# Patient Record
Sex: MALE | Race: WHITE | Employment: FULL TIME | URBAN - METROPOLITAN AREA
[De-identification: names, ages, dates, MRNs, and addresses within clinical notes are randomized per-mention and may not be internally consistent; named-entity substitution may affect disease eponyms.]

---

## 2020-06-18 NOTE — LETTER
Patient Name: Aida Faye CSN: [id number]  -Age / Sex: 1970-A: 50 y  male Medical Records: [id number]    The above patient had a positive COVID test on: _2022___________      Per [facility] Infection Control guidelines this patient will NOT be retested for COVID  prior to their surgery/procedure on: __2023____________. Thank you. Patient notified and had no further questions at this time. Provided patient phone number to call and schedule.

## 2020-08-06 ENCOUNTER — HOSPITAL ENCOUNTER (OUTPATIENT)
Dept: CT IMAGING | Facility: HOSPITAL | Age: 50
Discharge: HOME | End: 2020-08-06
Attending: FAMILY MEDICINE

## 2020-08-06 DIAGNOSIS — Z13.6 SCREENING FOR CARDIOVASCULAR CONDITION: ICD-10-CM

## 2020-09-18 ENCOUNTER — ORDER TRANSCRIPTION (OUTPATIENT)
Dept: ADMINISTRATIVE | Facility: HOSPITAL | Age: 50
End: 2020-09-18

## 2020-09-18 DIAGNOSIS — Z01.818 OTHER SPECIFIED PRE-OPERATIVE EXAMINATION: Primary | ICD-10-CM

## 2020-09-18 DIAGNOSIS — Z11.59 ENCOUNTER FOR SCREENING FOR OTHER VIRAL DISEASES: ICD-10-CM

## 2020-09-22 ENCOUNTER — APPOINTMENT (OUTPATIENT)
Dept: LAB | Facility: HOSPITAL | Age: 50
End: 2020-09-22
Attending: FAMILY MEDICINE
Payer: COMMERCIAL

## 2020-09-22 DIAGNOSIS — Z11.59 ENCOUNTER FOR SCREENING FOR OTHER VIRAL DISEASES: ICD-10-CM

## 2020-09-22 DIAGNOSIS — Z01.818 OTHER SPECIFIED PRE-OPERATIVE EXAMINATION: ICD-10-CM

## 2020-09-23 LAB — SARS-COV-2 RNA RESP QL NAA+PROBE: NOT DETECTED

## 2020-09-25 ENCOUNTER — HOSPITAL ENCOUNTER (OUTPATIENT)
Dept: CV DIAGNOSTICS | Facility: HOSPITAL | Age: 50
Discharge: HOME | End: 2020-09-25
Attending: FAMILY MEDICINE
Payer: COMMERCIAL

## 2020-09-25 DIAGNOSIS — Z82.49 FAMILY HISTORY OF ISCHEMIC HEART DISEASE AND OTHER DISEASES OF THE CIRCULATORY SYSTEM: ICD-10-CM

## 2020-09-25 DIAGNOSIS — I25.84 CORONARY ATHEROSCLEROSIS DUE TO CALCIFIED CORONARY LESION (CODE): ICD-10-CM

## 2020-09-25 PROCEDURE — 93350 STRESS TTE ONLY: CPT | Performed by: FAMILY MEDICINE

## 2020-09-25 PROCEDURE — 93018 CV STRESS TEST I&R ONLY: CPT | Performed by: FAMILY MEDICINE

## 2020-09-25 PROCEDURE — 93017 CV STRESS TEST TRACING ONLY: CPT | Performed by: FAMILY MEDICINE

## 2020-11-13 ENCOUNTER — HOSPITAL ENCOUNTER (OUTPATIENT)
Age: 50
Discharge: HOME | End: 2020-11-13
Payer: COMMERCIAL

## 2020-11-13 VITALS
SYSTOLIC BLOOD PRESSURE: 137 MMHG | TEMPERATURE: 97 F | WEIGHT: 230 LBS | HEIGHT: 70 IN | RESPIRATION RATE: 16 BRPM | HEART RATE: 94 BPM | OXYGEN SATURATION: 96 % | BODY MASS INDEX: 32.93 KG/M2 | DIASTOLIC BLOOD PRESSURE: 94 MMHG

## 2020-11-13 DIAGNOSIS — Z20.822 ENCOUNTER FOR LABORATORY TESTING FOR COVID-19 VIRUS: Primary | ICD-10-CM

## 2020-11-13 PROCEDURE — 99213 OFFICE O/P EST LOW 20 MIN: CPT | Performed by: NURSE PRACTITIONER

## 2020-11-13 RX ORDER — TESTOSTERONE CYPIONATE 200 MG/ML
INJECTION INTRAMUSCULAR
COMMUNITY
Start: 2020-09-18

## 2020-11-13 RX ORDER — THYROID 30 MG/1
TABLET ORAL DAILY
COMMUNITY
Start: 2020-11-12

## 2020-11-13 NOTE — ED PROVIDER NOTES
Patient Seen in: Immediate [address] [place] [address]      History   Patient presents with:  Fever    Stated Complaint: covid exposure    Patient presents to immediate care for COVID testing. Patient reports he received his flu shots yesterday.   He has had a Appearance: Normal appearance. HENT:      Head: Normocephalic. Nose: Nose normal.      Mouth/Throat:      Mouth: Mucous membranes are moist.      Pharynx: Oropharynx is clear. Eyes:      Extraocular Movements: Extraocular movements intact.    Tresa Grace immediate care or ER for new, worsening or any persistent conditions. I've explained the importance of following up with Primary care physicican. The patient verbalized understanding of the discharge instructions and plan.

## 2020-11-13 NOTE — ED INITIAL ASSESSMENT (HPI)
He states he has traveled to [place] in the last 1-2 weeks and also is a distributor for police departments. A few days ago he started with diarrhea and body aches, he has since developed chills, fever, and dry cough. Fever this morning was 100.  He has b

## 2021-03-15 LAB
AMB EXT BUN: 13 MG/DL
AMB EXT CALCIUM: 11.1
AMB EXT CARBON DIOXIDE: 23
AMB EXT CHLORIDE: 103
AMB EXT CHOL/HDL RATIO: 7.4
AMB EXT CHOLESTEROL, TOTAL: 244 MG/DL
AMB EXT CMP ALT: 35 U/L
AMB EXT CMP AST: 37 U/L
AMB EXT CREATININE: 1.04 MG/DL
AMB EXT EGFR NON-AA: 85
AMB EXT EGFR-AA: 98
AMB EXT GLUCOSE: 90 MG/DL
AMB EXT HDL CHOLESTEROL: 33 MG/DL
AMB EXT LDL CHOLESTEROL, DIRECT: 169 MG/DL
AMB EXT POSTASSIUM: 4.8 MMOL/L
AMB EXT SODIUM: 140 MMOL/L
AMB EXT TOTAL PROTEIN: 7.1
AMB EXT TRIGLYCERIDES: 224 MG/DL

## 2021-06-18 LAB
AMB EXT HEMATOCRIT: 55.7
AMB EXT HEMOGLOBIN: 18.6
AMB EXT MCV: 95
AMB EXT PLATELETS: 253
AMB EXT TSH: 3.33 MIU/ML
AMB EXT WBC: 6.9 X10(3)UL

## 2021-11-07 ENCOUNTER — OFFICE VISIT (OUTPATIENT)
Dept: FAMILY MEDICINE CLINIC | Facility: CLINIC | Age: 51
End: 2021-11-07
Payer: COMMERCIAL

## 2021-11-07 VITALS
TEMPERATURE: 97 F | HEART RATE: 84 BPM | SYSTOLIC BLOOD PRESSURE: 136 MMHG | WEIGHT: 274 LBS | BODY MASS INDEX: 39.22 KG/M2 | OXYGEN SATURATION: 99 % | HEIGHT: 70 IN | DIASTOLIC BLOOD PRESSURE: 86 MMHG | RESPIRATION RATE: 20 BRPM

## 2021-11-07 DIAGNOSIS — J01.10 ACUTE NON-RECURRENT FRONTAL SINUSITIS: Primary | ICD-10-CM

## 2021-11-07 PROCEDURE — 3008F BODY MASS INDEX DOCD: CPT | Performed by: NURSE PRACTITIONER

## 2021-11-07 PROCEDURE — 3075F SYST BP GE 130 - 139MM HG: CPT | Performed by: NURSE PRACTITIONER

## 2021-11-07 PROCEDURE — 99213 OFFICE O/P EST LOW 20 MIN: CPT | Performed by: NURSE PRACTITIONER

## 2021-11-07 PROCEDURE — 3079F DIAST BP 80-89 MM HG: CPT | Performed by: NURSE PRACTITIONER

## 2021-11-07 RX ORDER — ROSUVASTATIN CALCIUM 5 MG/1
TABLET, COATED ORAL
COMMUNITY
Start: 2021-10-29

## 2021-11-07 RX ORDER — AMOXICILLIN AND CLAVULANATE POTASSIUM 875; 125 MG/1; MG/1
1 TABLET, FILM COATED ORAL 2 TIMES DAILY
Qty: 20 TABLET | Refills: 0 | Status: SHIPPED | OUTPATIENT
Start: 2021-11-07 | End: 2021-11-17

## 2021-11-07 NOTE — PROGRESS NOTES
CHIEF COMPLAINT:   Patient presents with:  Sinus Problem: 3-4 days nasal congestion, short of breathe, body aches OTC dayquil       HPI:   Yogesh Moore is a 54year old male who presents for cold symptoms for  4 weeks.  Symptoms have progressed into sinu lesions  HEAD: atraumatic, normocephalic, + tenderness on palpation of maxillary sinuses  EYES: conjunctiva clear, EOM intact  EARS: TM's intact  NOSE: nostrils patent, no nasal mucous, nasal mucosa reddened and swollen  THROAT: oral mucosa pink, moist. No sinuses then causes pain and pressure. It can also cause bacteria to grow in the sinuses. What causes acute rhinosinusitis? A sinus infection is most often caused by a virus. You are more likely to get one after having a cold or the flu.  In some cases, medicine until it is gone, even if you feel better. To help ease your symptoms, your healthcare provider may advise:  · Over-the-counter pain relievers. Medicines such as acetaminophen or ibuprofen can ease sinus pain. They may also lower a fever.   · Nasa

## 2021-11-07 NOTE — PATIENT INSTRUCTIONS
Use the antibiotic for 10 days with food  OK to use OTC cold and cough medications      Understanding Acute Rhinosinusitis  Acute rhinosinusitis is when the lining of the inside of the nose and the sinuses becomes irritated and swollen.  It is also called s pain  · Fatigue  Diagnosing acute rhinosinusitis  Your healthcare provider will ask about your symptoms and past health. He or she will look at your ears, nose, throat, and sinuses. Imaging tests, such as X-rays, are often not needed.   It can be hard to fi provider right away if you have any of these:  · Fever of 100.4°F (38°C) or higher, or as directed by your healthcare provider  · Pain that gets worse  · Symptoms that don’t get better, or get worse  · New symptoms  Aubrie last reviewed this educational

## 2022-01-05 ENCOUNTER — IMMUNIZATION (OUTPATIENT)
Dept: LAB | Facility: HOSPITAL | Age: 52
End: 2022-01-05
Attending: EMERGENCY MEDICINE
Payer: COMMERCIAL

## 2022-01-05 DIAGNOSIS — Z23 NEED FOR VACCINATION: Primary | ICD-10-CM

## 2022-01-05 PROCEDURE — 0064A SARSCOV2 VAC 50MCG/0.25ML IM: CPT | Performed by: NURSE PRACTITIONER

## 2022-03-02 ENCOUNTER — OFFICE VISIT (OUTPATIENT)
Dept: FAMILY MEDICINE CLINIC | Facility: CLINIC | Age: 52
End: 2022-03-02
Payer: COMMERCIAL

## 2022-03-02 VITALS
TEMPERATURE: 97 F | SYSTOLIC BLOOD PRESSURE: 138 MMHG | WEIGHT: 230 LBS | DIASTOLIC BLOOD PRESSURE: 94 MMHG | RESPIRATION RATE: 16 BRPM | HEART RATE: 89 BPM | OXYGEN SATURATION: 97 % | HEIGHT: 70 IN | BODY MASS INDEX: 32.93 KG/M2

## 2022-03-02 DIAGNOSIS — J31.0 RHINOSINUSITIS: Primary | ICD-10-CM

## 2022-03-02 DIAGNOSIS — R03.0 ELEVATED BLOOD PRESSURE READING: ICD-10-CM

## 2022-03-02 DIAGNOSIS — J20.9 ACUTE BRONCHITIS, UNSPECIFIED ORGANISM: ICD-10-CM

## 2022-03-02 DIAGNOSIS — J32.9 RHINOSINUSITIS: Primary | ICD-10-CM

## 2022-03-02 LAB
OPERATOR ID: NORMAL
POCT LOT NUMBER: NORMAL
RAPID SARS-COV-2 BY PCR: NOT DETECTED

## 2022-03-02 PROCEDURE — U0002 COVID-19 LAB TEST NON-CDC: HCPCS | Performed by: NURSE PRACTITIONER

## 2022-03-02 PROCEDURE — 99213 OFFICE O/P EST LOW 20 MIN: CPT | Performed by: NURSE PRACTITIONER

## 2022-03-02 PROCEDURE — 3075F SYST BP GE 130 - 139MM HG: CPT | Performed by: NURSE PRACTITIONER

## 2022-03-02 PROCEDURE — 3008F BODY MASS INDEX DOCD: CPT | Performed by: NURSE PRACTITIONER

## 2022-03-02 PROCEDURE — 3080F DIAST BP >= 90 MM HG: CPT | Performed by: NURSE PRACTITIONER

## 2022-03-02 RX ORDER — DOXYCYCLINE HYCLATE 100 MG
TABLET ORAL
Qty: 14 TABLET | Refills: 0 | Status: SHIPPED | OUTPATIENT
Start: 2022-03-02 | End: 2022-03-24

## 2022-03-02 RX ORDER — ALBUTEROL SULFATE 90 UG/1
AEROSOL, METERED RESPIRATORY (INHALATION)
Qty: 1 EACH | Refills: 0 | Status: SHIPPED | OUTPATIENT
Start: 2022-03-02 | End: 2022-03-24

## 2022-03-24 ENCOUNTER — OFFICE VISIT (OUTPATIENT)
Dept: INTERNAL MEDICINE CLINIC | Facility: CLINIC | Age: 52
End: 2022-03-24
Payer: COMMERCIAL

## 2022-03-24 VITALS
RESPIRATION RATE: 18 BRPM | SYSTOLIC BLOOD PRESSURE: 146 MMHG | DIASTOLIC BLOOD PRESSURE: 96 MMHG | BODY MASS INDEX: 35.6 KG/M2 | HEIGHT: 70 IN | WEIGHT: 248.63 LBS | HEART RATE: 88 BPM

## 2022-03-24 DIAGNOSIS — Z13.0 SCREENING FOR DEFICIENCY ANEMIA: ICD-10-CM

## 2022-03-24 DIAGNOSIS — E78.00 HYPERCHOLESTEREMIA: Primary | ICD-10-CM

## 2022-03-24 DIAGNOSIS — F90.0 ATTENTION DEFICIT HYPERACTIVITY DISORDER (ADHD), PREDOMINANTLY INATTENTIVE TYPE: ICD-10-CM

## 2022-03-24 DIAGNOSIS — R03.0 ELEVATED BLOOD PRESSURE READING: ICD-10-CM

## 2022-03-24 DIAGNOSIS — E03.9 HYPOTHYROIDISM, UNSPECIFIED TYPE: ICD-10-CM

## 2022-03-24 DIAGNOSIS — R79.89 LOW TESTOSTERONE IN MALE: ICD-10-CM

## 2022-03-24 DIAGNOSIS — Z12.5 PROSTATE CANCER SCREENING: ICD-10-CM

## 2022-03-24 PROCEDURE — 3080F DIAST BP >= 90 MM HG: CPT | Performed by: FAMILY MEDICINE

## 2022-03-24 PROCEDURE — 3008F BODY MASS INDEX DOCD: CPT | Performed by: FAMILY MEDICINE

## 2022-03-24 PROCEDURE — 3077F SYST BP >= 140 MM HG: CPT | Performed by: FAMILY MEDICINE

## 2022-03-24 PROCEDURE — 99213 OFFICE O/P EST LOW 20 MIN: CPT | Performed by: FAMILY MEDICINE

## 2022-03-24 RX ORDER — LEVOTHYROXINE AND LIOTHYRONINE 38; 9 UG/1; UG/1
60 TABLET ORAL DAILY
COMMUNITY

## 2022-03-24 RX ORDER — TESTOSTERONE CYPIONATE 200 MG/ML
200 INJECTION INTRAMUSCULAR
Qty: 16 ML | Refills: 0 | Status: SHIPPED | OUTPATIENT
Start: 2022-03-24

## 2022-03-24 RX ORDER — DEXTROAMPHETAMINE SACCHARATE, AMPHETAMINE ASPARTATE MONOHYDRATE, DEXTROAMPHETAMINE SULFATE AND AMPHETAMINE SULFATE 2.5; 2.5; 2.5; 2.5 MG/1; MG/1; MG/1; MG/1
10 CAPSULE, EXTENDED RELEASE ORAL EVERY MORNING
COMMUNITY
End: 2022-03-24 | Stop reason: DRUGHIGH

## 2022-03-24 RX ORDER — DEXTROAMPHETAMINE SACCHARATE, AMPHETAMINE ASPARTATE MONOHYDRATE, DEXTROAMPHETAMINE SULFATE AND AMPHETAMINE SULFATE 1.25; 1.25; 1.25; 1.25 MG/1; MG/1; MG/1; MG/1
5 CAPSULE, EXTENDED RELEASE ORAL EVERY MORNING
COMMUNITY
End: 2022-03-24 | Stop reason: DRUGHIGH

## 2022-03-24 RX ORDER — ANASTROZOLE 1 MG/1
1 TABLET ORAL
COMMUNITY

## 2022-03-24 RX ORDER — DEXTROAMPHETAMINE SACCHARATE, AMPHETAMINE ASPARTATE MONOHYDRATE, DEXTROAMPHETAMINE SULFATE AND AMPHETAMINE SULFATE 1.25; 1.25; 1.25; 1.25 MG/1; MG/1; MG/1; MG/1
5 CAPSULE, EXTENDED RELEASE ORAL EVERY MORNING
Qty: 30 CAPSULE | Refills: 0 | Status: SHIPPED | OUTPATIENT
Start: 2022-03-24

## 2022-03-25 ENCOUNTER — LAB ENCOUNTER (OUTPATIENT)
Dept: LAB | Age: 52
End: 2022-03-25
Attending: FAMILY MEDICINE
Payer: COMMERCIAL

## 2022-03-25 DIAGNOSIS — Z12.5 PROSTATE CANCER SCREENING: ICD-10-CM

## 2022-03-25 DIAGNOSIS — Z13.0 SCREENING FOR DEFICIENCY ANEMIA: ICD-10-CM

## 2022-03-25 DIAGNOSIS — E03.9 HYPOTHYROIDISM, UNSPECIFIED TYPE: ICD-10-CM

## 2022-03-25 DIAGNOSIS — E78.00 HYPERCHOLESTEREMIA: ICD-10-CM

## 2022-03-25 DIAGNOSIS — R79.89 LOW TESTOSTERONE IN MALE: ICD-10-CM

## 2022-03-25 LAB
ALBUMIN SERPL-MCNC: 3.9 G/DL (ref 3.4–5)
ALBUMIN/GLOB SERPL: 1.3 {RATIO} (ref 1–2)
ALP LIVER SERPL-CCNC: 68 U/L
ALT SERPL-CCNC: 42 U/L
ANION GAP SERPL CALC-SCNC: 1 MMOL/L (ref 0–18)
AST SERPL-CCNC: 26 U/L (ref 15–37)
BASOPHILS # BLD AUTO: 0.05 X10(3) UL (ref 0–0.2)
BASOPHILS NFR BLD AUTO: 0.7 %
BILIRUB SERPL-MCNC: 0.6 MG/DL (ref 0.1–2)
BUN BLD-MCNC: 11 MG/DL (ref 7–18)
CALCIUM BLD-MCNC: 10.8 MG/DL (ref 8.5–10.1)
CHLORIDE SERPL-SCNC: 108 MMOL/L (ref 98–112)
CHOLEST SERPL-MCNC: 155 MG/DL (ref ?–200)
COMPLEXED PSA SERPL-MCNC: 1.64 NG/ML (ref ?–4)
CREAT BLD-MCNC: 1.16 MG/DL
EOSINOPHIL # BLD AUTO: 0.24 X10(3) UL (ref 0–0.7)
EOSINOPHIL NFR BLD AUTO: 3.4 %
ERYTHROCYTE [DISTWIDTH] IN BLOOD BY AUTOMATED COUNT: 13.4 %
FASTING PATIENT LIPID ANSWER: YES
FASTING STATUS PATIENT QL REPORTED: YES
GLOBULIN PLAS-MCNC: 2.9 G/DL (ref 2.8–4.4)
GLUCOSE BLD-MCNC: 99 MG/DL (ref 70–99)
HCT VFR BLD AUTO: 51.5 %
HDLC SERPL-MCNC: 30 MG/DL (ref 40–59)
HGB BLD-MCNC: 17.5 G/DL
IMM GRANULOCYTES # BLD AUTO: 0.07 X10(3) UL (ref 0–1)
IMM GRANULOCYTES NFR BLD: 1 %
LDLC SERPL CALC-MCNC: 96 MG/DL (ref ?–100)
LYMPHOCYTES # BLD AUTO: 1.83 X10(3) UL (ref 1–4)
LYMPHOCYTES NFR BLD AUTO: 25.8 %
MCH RBC QN AUTO: 31.9 PG (ref 26–34)
MCHC RBC AUTO-ENTMCNC: 34 G/DL (ref 31–37)
MCV RBC AUTO: 93.8 FL
MONOCYTES # BLD AUTO: 0.55 X10(3) UL (ref 0.1–1)
MONOCYTES NFR BLD AUTO: 7.7 %
NEUTROPHILS # BLD AUTO: 4.36 X10 (3) UL (ref 1.5–7.7)
NEUTROPHILS # BLD AUTO: 4.36 X10(3) UL (ref 1.5–7.7)
NEUTROPHILS NFR BLD AUTO: 61.4 %
NONHDLC SERPL-MCNC: 125 MG/DL (ref ?–130)
OSMOLALITY SERPL CALC.SUM OF ELEC: 289 MOSM/KG (ref 275–295)
PLATELET # BLD AUTO: 248 10(3)UL (ref 150–450)
POTASSIUM SERPL-SCNC: 5.1 MMOL/L (ref 3.5–5.1)
PROT SERPL-MCNC: 6.8 G/DL (ref 6.4–8.2)
RBC # BLD AUTO: 5.49 X10(6)UL
SODIUM SERPL-SCNC: 140 MMOL/L (ref 136–145)
TRIGL SERPL-MCNC: 167 MG/DL (ref 30–149)
TSI SER-ACNC: 2.4 MIU/ML (ref 0.36–3.74)
VLDLC SERPL CALC-MCNC: 28 MG/DL (ref 0–30)
WBC # BLD AUTO: 7.1 X10(3) UL (ref 4–11)

## 2022-03-25 PROCEDURE — 85025 COMPLETE CBC W/AUTO DIFF WBC: CPT

## 2022-03-25 PROCEDURE — 84443 ASSAY THYROID STIM HORMONE: CPT

## 2022-03-25 PROCEDURE — 84402 ASSAY OF FREE TESTOSTERONE: CPT

## 2022-03-25 PROCEDURE — 84403 ASSAY OF TOTAL TESTOSTERONE: CPT

## 2022-03-25 PROCEDURE — 36415 COLL VENOUS BLD VENIPUNCTURE: CPT

## 2022-03-25 PROCEDURE — 80061 LIPID PANEL: CPT

## 2022-03-25 PROCEDURE — 80053 COMPREHEN METABOLIC PANEL: CPT

## 2022-03-30 ENCOUNTER — TELEPHONE (OUTPATIENT)
Dept: INTERNAL MEDICINE CLINIC | Facility: CLINIC | Age: 52
End: 2022-03-30

## 2022-03-31 ENCOUNTER — PATIENT MESSAGE (OUTPATIENT)
Dept: INTERNAL MEDICINE CLINIC | Facility: CLINIC | Age: 52
End: 2022-03-31

## 2022-03-31 ENCOUNTER — LAB ENCOUNTER (OUTPATIENT)
Dept: LAB | Age: 52
End: 2022-03-31
Attending: FAMILY MEDICINE
Payer: COMMERCIAL

## 2022-03-31 DIAGNOSIS — E83.52 SERUM CALCIUM ELEVATED: ICD-10-CM

## 2022-03-31 LAB
ANION GAP SERPL CALC-SCNC: 5 MMOL/L (ref 0–18)
BUN BLD-MCNC: 14 MG/DL (ref 7–18)
CALCIUM BLD-MCNC: 10.7 MG/DL (ref 8.5–10.1)
CHLORIDE SERPL-SCNC: 107 MMOL/L (ref 98–112)
CO2 SERPL-SCNC: 29 MMOL/L (ref 21–32)
CREAT BLD-MCNC: 1.13 MG/DL
FASTING STATUS PATIENT QL REPORTED: YES
GLUCOSE BLD-MCNC: 96 MG/DL (ref 70–99)
OSMOLALITY SERPL CALC.SUM OF ELEC: 292 MOSM/KG (ref 275–295)
POTASSIUM SERPL-SCNC: 4.5 MMOL/L (ref 3.5–5.1)
PTH-INTACT SERPL-MCNC: 146.9 PG/ML (ref 18.5–88)
SODIUM SERPL-SCNC: 141 MMOL/L (ref 136–145)

## 2022-03-31 PROCEDURE — 36415 COLL VENOUS BLD VENIPUNCTURE: CPT

## 2022-03-31 PROCEDURE — 80048 BASIC METABOLIC PNL TOTAL CA: CPT

## 2022-03-31 PROCEDURE — 83970 ASSAY OF PARATHORMONE: CPT

## 2022-03-31 NOTE — TELEPHONE ENCOUNTER
From: Allen Anguiano  To: Scooter Us MD  Sent: 3/31/2022 12:07 PM CDT  Subject: Question regarding TSH W REFLEX TO FREE T4    I see this has dropped significantly since last June, could this part of my gain?

## 2022-03-31 NOTE — TELEPHONE ENCOUNTER
His TSH is in the normal range, thus is dosing is appropriate and not likely contributing to weight gain. Will discuss further at follow-up visit.

## 2022-04-02 LAB — TESTOSTERONE TOTAL: 927.9 NG/DL

## 2022-04-08 ENCOUNTER — OFFICE VISIT (OUTPATIENT)
Dept: INTERNAL MEDICINE CLINIC | Facility: CLINIC | Age: 52
End: 2022-04-08
Payer: COMMERCIAL

## 2022-04-08 VITALS
TEMPERATURE: 97 F | OXYGEN SATURATION: 98 % | HEIGHT: 69.75 IN | WEIGHT: 240 LBS | HEART RATE: 86 BPM | SYSTOLIC BLOOD PRESSURE: 128 MMHG | DIASTOLIC BLOOD PRESSURE: 88 MMHG | BODY MASS INDEX: 34.75 KG/M2

## 2022-04-08 DIAGNOSIS — Z00.00 WELLNESS EXAMINATION: Primary | ICD-10-CM

## 2022-04-08 DIAGNOSIS — E03.9 HYPOTHYROIDISM, UNSPECIFIED TYPE: ICD-10-CM

## 2022-04-08 DIAGNOSIS — E83.52 HYPERCALCEMIA: ICD-10-CM

## 2022-04-08 DIAGNOSIS — E21.0 PRIMARY HYPERPARATHYROIDISM (HCC): ICD-10-CM

## 2022-04-08 DIAGNOSIS — R79.89 LOW TESTOSTERONE IN MALE: ICD-10-CM

## 2022-04-08 DIAGNOSIS — E78.00 HYPERCHOLESTEREMIA: ICD-10-CM

## 2022-04-08 DIAGNOSIS — Z12.11 SCREENING FOR MALIGNANT NEOPLASM OF COLON: ICD-10-CM

## 2022-04-08 DIAGNOSIS — F90.0 ATTENTION DEFICIT HYPERACTIVITY DISORDER (ADHD), PREDOMINANTLY INATTENTIVE TYPE: ICD-10-CM

## 2022-04-08 PROCEDURE — 3079F DIAST BP 80-89 MM HG: CPT | Performed by: FAMILY MEDICINE

## 2022-04-08 PROCEDURE — 3008F BODY MASS INDEX DOCD: CPT | Performed by: FAMILY MEDICINE

## 2022-04-08 PROCEDURE — 3074F SYST BP LT 130 MM HG: CPT | Performed by: FAMILY MEDICINE

## 2022-04-08 PROCEDURE — 99396 PREV VISIT EST AGE 40-64: CPT | Performed by: FAMILY MEDICINE

## 2022-04-08 NOTE — PROGRESS NOTES
[facility] completed and faxed to [phone] ; confirmation obtained, barcode printed and form sent to scanning.

## 2022-04-25 ENCOUNTER — OFFICE VISIT (OUTPATIENT)
Dept: ENDOCRINOLOGY CLINIC | Facility: CLINIC | Age: 52
End: 2022-04-25
Payer: COMMERCIAL

## 2022-04-25 ENCOUNTER — LAB ENCOUNTER (OUTPATIENT)
Dept: LAB | Age: 52
End: 2022-04-25
Attending: STUDENT IN AN ORGANIZED HEALTH CARE EDUCATION/TRAINING PROGRAM
Payer: COMMERCIAL

## 2022-04-25 ENCOUNTER — TELEPHONE (OUTPATIENT)
Dept: ENDOCRINOLOGY CLINIC | Facility: CLINIC | Age: 52
End: 2022-04-25

## 2022-04-25 VITALS
BODY MASS INDEX: 35 KG/M2 | OXYGEN SATURATION: 98 % | SYSTOLIC BLOOD PRESSURE: 144 MMHG | HEART RATE: 88 BPM | WEIGHT: 244.19 LBS | DIASTOLIC BLOOD PRESSURE: 90 MMHG

## 2022-04-25 DIAGNOSIS — E03.9 HYPOTHYROIDISM, UNSPECIFIED TYPE: ICD-10-CM

## 2022-04-25 DIAGNOSIS — E83.52 HYPERCALCEMIA: Primary | ICD-10-CM

## 2022-04-25 DIAGNOSIS — R79.89 LOW TESTOSTERONE IN MALE: ICD-10-CM

## 2022-04-25 DIAGNOSIS — E83.52 HYPERCALCEMIA: ICD-10-CM

## 2022-04-25 LAB — VIT D+METAB SERPL-MCNC: 22.8 NG/ML (ref 30–100)

## 2022-04-25 PROCEDURE — 36415 COLL VENOUS BLD VENIPUNCTURE: CPT

## 2022-04-25 PROCEDURE — 82306 VITAMIN D 25 HYDROXY: CPT

## 2022-04-25 RX ORDER — ANASTROZOLE 1 MG/1
1 TABLET ORAL
COMMUNITY

## 2022-04-25 RX ORDER — LEVOTHYROXINE AND LIOTHYRONINE 38; 9 UG/1; UG/1
60 TABLET ORAL DAILY
Qty: 90 TABLET | Refills: 0 | Status: SHIPPED | OUTPATIENT
Start: 2022-04-25

## 2022-04-25 RX ORDER — TESTOSTERONE CYPIONATE 200 MG/ML
100 INJECTION INTRAMUSCULAR
Qty: 16 ML | Refills: 0 | Status: SHIPPED | OUTPATIENT
Start: 2022-04-25 | End: 2022-04-25

## 2022-04-25 RX ORDER — TESTOSTERONE CYPIONATE 200 MG/ML
100 INJECTION INTRAMUSCULAR
Qty: 16 ML | Refills: 0 | Status: SHIPPED | OUTPATIENT
Start: 2022-04-25

## 2022-04-25 NOTE — TELEPHONE ENCOUNTER
Phoned patient, left voicemail advising patient that  Is unable to prescribe the testosterone at this time due to epic IT issue. Advised patient that Dr. Marcy Mcdaniel is requesting that patient have his testosterone filled an additional time by his PCP until Epic issue is resolved.

## 2022-04-26 ENCOUNTER — LAB ENCOUNTER (OUTPATIENT)
Dept: LAB | Facility: HOSPITAL | Age: 52
End: 2022-04-26
Attending: INTERNAL MEDICINE
Payer: COMMERCIAL

## 2022-04-27 ENCOUNTER — LAB ENCOUNTER (OUTPATIENT)
Dept: LAB | Facility: HOSPITAL | Age: 52
End: 2022-04-27
Payer: COMMERCIAL

## 2022-04-27 DIAGNOSIS — E83.52 HYPERCALCEMIA: ICD-10-CM

## 2022-04-27 LAB
CALCIUM 24H UR-SRATE: 558 MG/24HR (ref 42–353)
CREAT UR-SCNC: 2.37 G/24 HR (ref 0.95–2.49)
SPECIMEN VOL UR: 1550 ML
SPECIMEN VOL UR: 1550 ML

## 2022-04-27 PROCEDURE — 82570 ASSAY OF URINE CREATININE: CPT

## 2022-04-27 PROCEDURE — 82340 ASSAY OF CALCIUM IN URINE: CPT

## 2022-04-28 PROBLEM — E21.0 PRIMARY HYPERPARATHYROIDISM (HCC): Status: ACTIVE | Noted: 2022-04-28

## 2022-05-10 ENCOUNTER — HOSPITAL ENCOUNTER (OUTPATIENT)
Dept: BONE DENSITY | Age: 52
Discharge: HOME | End: 2022-05-10
Attending: STUDENT IN AN ORGANIZED HEALTH CARE EDUCATION/TRAINING PROGRAM
Payer: COMMERCIAL

## 2022-05-10 DIAGNOSIS — E21.0 PRIMARY HYPERPARATHYROIDISM (HCC): ICD-10-CM

## 2022-05-10 PROCEDURE — 77080 DXA BONE DENSITY AXIAL: CPT | Performed by: STUDENT IN AN ORGANIZED HEALTH CARE EDUCATION/TRAINING PROGRAM

## 2022-05-18 ENCOUNTER — OFFICE VISIT (OUTPATIENT)
Dept: FAMILY MEDICINE CLINIC | Facility: CLINIC | Age: 52
End: 2022-05-18
Payer: COMMERCIAL

## 2022-05-18 VITALS
SYSTOLIC BLOOD PRESSURE: 112 MMHG | HEART RATE: 105 BPM | BODY MASS INDEX: 35.79 KG/M2 | DIASTOLIC BLOOD PRESSURE: 70 MMHG | WEIGHT: 250 LBS | OXYGEN SATURATION: 98 % | HEIGHT: 70 IN | RESPIRATION RATE: 16 BRPM | TEMPERATURE: 97 F

## 2022-05-18 DIAGNOSIS — J06.9 VIRAL URI: Primary | ICD-10-CM

## 2022-05-18 DIAGNOSIS — J02.9 SORE THROAT: ICD-10-CM

## 2022-05-18 LAB
CONTROL LINE PRESENT WITH A CLEAR BACKGROUND (YES/NO): YES YES/NO
OPERATOR ID: NORMAL
POCT LOT NUMBER: NORMAL
RAPID SARS-COV-2 BY PCR: NOT DETECTED
STREP GRP A CUL-SCR: NEGATIVE

## 2022-05-18 PROCEDURE — 87880 STREP A ASSAY W/OPTIC: CPT | Performed by: NURSE PRACTITIONER

## 2022-05-18 PROCEDURE — 3078F DIAST BP <80 MM HG: CPT | Performed by: NURSE PRACTITIONER

## 2022-05-18 PROCEDURE — 99213 OFFICE O/P EST LOW 20 MIN: CPT | Performed by: NURSE PRACTITIONER

## 2022-05-18 PROCEDURE — 3074F SYST BP LT 130 MM HG: CPT | Performed by: NURSE PRACTITIONER

## 2022-05-18 PROCEDURE — 3008F BODY MASS INDEX DOCD: CPT | Performed by: NURSE PRACTITIONER

## 2022-05-18 PROCEDURE — U0002 COVID-19 LAB TEST NON-CDC: HCPCS | Performed by: NURSE PRACTITIONER

## 2022-05-20 ENCOUNTER — OFFICE VISIT (OUTPATIENT)
Dept: FAMILY MEDICINE CLINIC | Facility: CLINIC | Age: 52
End: 2022-05-20
Payer: COMMERCIAL

## 2022-05-20 VITALS
TEMPERATURE: 98 F | HEIGHT: 70 IN | BODY MASS INDEX: 35.07 KG/M2 | RESPIRATION RATE: 18 BRPM | OXYGEN SATURATION: 97 % | SYSTOLIC BLOOD PRESSURE: 134 MMHG | HEART RATE: 90 BPM | DIASTOLIC BLOOD PRESSURE: 90 MMHG | WEIGHT: 245 LBS

## 2022-05-20 DIAGNOSIS — R03.0 ELEVATED BLOOD PRESSURE READING: ICD-10-CM

## 2022-05-20 DIAGNOSIS — U07.1 COVID-19 VIRUS INFECTION: Primary | ICD-10-CM

## 2022-05-20 LAB
OPERATOR ID: ABNORMAL
RAPID SARS-COV-2 BY PCR: DETECTED

## 2022-05-20 PROCEDURE — U0002 COVID-19 LAB TEST NON-CDC: HCPCS | Performed by: NURSE PRACTITIONER

## 2022-05-20 PROCEDURE — 3080F DIAST BP >= 90 MM HG: CPT | Performed by: NURSE PRACTITIONER

## 2022-05-20 PROCEDURE — 3008F BODY MASS INDEX DOCD: CPT | Performed by: NURSE PRACTITIONER

## 2022-05-20 PROCEDURE — 3075F SYST BP GE 130 - 139MM HG: CPT | Performed by: NURSE PRACTITIONER

## 2022-05-20 PROCEDURE — 99213 OFFICE O/P EST LOW 20 MIN: CPT | Performed by: NURSE PRACTITIONER

## 2022-05-26 ENCOUNTER — TELEPHONE (OUTPATIENT)
Dept: ENDOCRINOLOGY CLINIC | Facility: CLINIC | Age: 52
End: 2022-05-26

## 2022-05-26 RX ORDER — LEVOTHYROXINE AND LIOTHYRONINE 19; 4.5 UG/1; UG/1
30 TABLET ORAL DAILY
Qty: 30 TABLET | Refills: 0 | Status: SHIPPED | OUTPATIENT
Start: 2022-05-26

## 2022-05-26 NOTE — TELEPHONE ENCOUNTER
Phoned patient, left voicemail advising new prescription for [place] 30 mg 30 day supply sent to pharmacy.

## 2022-05-26 NOTE — TELEPHONE ENCOUNTER
Patient called in stating there is issue with rx: Thyroid 60 MG Oral Tablet ([place]). Patient stated pharmacy informed him that this pill is not meant to be split in half and they will need order changed to 60 tablets of Thyroid 30MG.

## 2022-06-01 ENCOUNTER — HOSPITAL ENCOUNTER (OUTPATIENT)
Dept: NUCLEAR MEDICINE | Facility: HOSPITAL | Age: 52
Discharge: HOME | End: 2022-06-01
Attending: STUDENT IN AN ORGANIZED HEALTH CARE EDUCATION/TRAINING PROGRAM
Payer: COMMERCIAL

## 2022-06-01 ENCOUNTER — TELEPHONE (OUTPATIENT)
Dept: ENDOCRINOLOGY CLINIC | Facility: CLINIC | Age: 52
End: 2022-06-01

## 2022-06-01 DIAGNOSIS — E21.0 PRIMARY HYPERPARATHYROIDISM (HCC): ICD-10-CM

## 2022-06-01 PROCEDURE — 78072 PARATHYRD PLANAR W/SPECT&CT: CPT | Performed by: STUDENT IN AN ORGANIZED HEALTH CARE EDUCATION/TRAINING PROGRAM

## 2022-06-01 NOTE — TELEPHONE ENCOUNTER
Patient phoned to ask if necessary to keep consult scheduled for Friday, June 3rd with surgeon given the recent medication change? Or do you prefer him to wait? Please advise. Thank you.

## 2022-06-01 NOTE — TELEPHONE ENCOUNTER
Patient phoned states results of NM scan back. Patient states no tumor found. He is going to cancel appointment with surgeon on Friday? Is this okay? He would like to know what next steps should be.

## 2022-06-03 ENCOUNTER — OFFICE VISIT (OUTPATIENT)
Dept: OTOLARYNGOLOGY | Facility: CLINIC | Age: 52
End: 2022-06-03
Payer: COMMERCIAL

## 2022-06-03 VITALS — WEIGHT: 245 LBS | HEIGHT: 70 IN | BODY MASS INDEX: 35.07 KG/M2

## 2022-06-03 DIAGNOSIS — E03.8 OTHER SPECIFIED HYPOTHYROIDISM: ICD-10-CM

## 2022-06-03 DIAGNOSIS — D35.1 PARATHYROID ADENOMA: Primary | ICD-10-CM

## 2022-06-03 PROCEDURE — 3008F BODY MASS INDEX DOCD: CPT | Performed by: SPECIALIST

## 2022-06-03 PROCEDURE — 99203 OFFICE O/P NEW LOW 30 MIN: CPT | Performed by: SPECIALIST

## 2022-06-03 NOTE — PATIENT INSTRUCTIONS
Patient nuclear medicine scan was negative, a thyroid ultrasound was ordered to see whether the enlarged parathyroid could be visualized. I will of course notify you of the results. May also consider repeating the PTH in 1 to 2 months time.

## 2022-06-09 ENCOUNTER — HOSPITAL ENCOUNTER (OUTPATIENT)
Dept: ULTRASOUND IMAGING | Age: 52
Discharge: HOME | End: 2022-06-09
Attending: SPECIALIST
Payer: COMMERCIAL

## 2022-06-09 DIAGNOSIS — D35.1 PARATHYROID ADENOMA: ICD-10-CM

## 2022-06-09 DIAGNOSIS — E03.8 OTHER SPECIFIED HYPOTHYROIDISM: ICD-10-CM

## 2022-06-09 PROCEDURE — 76536 US EXAM OF HEAD AND NECK: CPT | Performed by: SPECIALIST

## 2022-06-14 RX ORDER — LEVOTHYROXINE AND LIOTHYRONINE 19; 4.5 UG/1; UG/1
30 TABLET ORAL DAILY
Qty: 90 TABLET | Refills: 0 | Status: SHIPPED | OUTPATIENT
Start: 2022-06-14

## 2022-06-14 RX ORDER — LEVOTHYROXINE AND LIOTHYRONINE 9.5; 2.25 UG/1; UG/1
15 TABLET ORAL DAILY
Qty: 30 TABLET | Refills: 0 | Status: CANCELLED | OUTPATIENT
Start: 2022-06-14

## 2022-06-14 RX ORDER — LEVOTHYROXINE AND LIOTHYRONINE 9.5; 2.25 UG/1; UG/1
15 TABLET ORAL DAILY
Qty: 90 TABLET | Refills: 0 | Status: SHIPPED | OUTPATIENT
Start: 2022-06-14 | End: 2022-06-14

## 2022-06-14 RX ORDER — LEVOTHYROXINE AND LIOTHYRONINE 9.5; 2.25 UG/1; UG/1
15 TABLET ORAL DAILY
Qty: 90 TABLET | Refills: 0 | Status: SHIPPED | OUTPATIENT
Start: 2022-06-14

## 2022-06-14 NOTE — TELEPHONE ENCOUNTER
Discussed with Dr. Tito Osgood. Thyroid increased to 45 mg daily. Prescriptions sent to pharmacy. US of thyroid looks normal for hypothyroid diagnosis. Pt to repeat labs 4-6 weeks after starting new thyroid dose. Called pt to discuss. Pt requesting 90 day RX for the 15 mg tablet. Ordered changed to 90 days. Pt will start new dose tomorrow and have labs drawn in 4-6 weeks. Follow up apt already scheduled.

## 2022-06-14 NOTE — TELEPHONE ENCOUNTER
Pt called. Verified name and . Pt states that decreasing his thyroid from 60-30 seems to much. He is extremely tired by the middle of the day. He was wondering if he could try a dose in between. Perhaps 45mg? He also would Dr. Shimon Alcantara to look at the [address] of the thyroid that Dr. Miquel Hartman ordered. He plans to get his repeat thyroid labs at the end of  as discussed with you. He wasn't sure if you wanted to postpone those if you change his medication dose. Thyroid RX pended.  Please advise

## 2022-06-23 RX ORDER — THYROID 30 MG/1
TABLET ORAL
Qty: 90 TABLET | Refills: 0 | Status: SHIPPED | OUTPATIENT
Start: 2022-06-23

## 2022-06-23 NOTE — TELEPHONE ENCOUNTER
LOV-4/25/22    Future Appointments   Date Time Provider Jose C Chong   8/1/2022 11:00 AM Fátima Cristobal [facility] [place]

## 2022-07-19 ENCOUNTER — OFFICE VISIT (OUTPATIENT)
Dept: FAMILY MEDICINE CLINIC | Facility: CLINIC | Age: 52
End: 2022-07-19
Payer: COMMERCIAL

## 2022-07-19 VITALS
BODY MASS INDEX: 36 KG/M2 | OXYGEN SATURATION: 99 % | HEART RATE: 88 BPM | SYSTOLIC BLOOD PRESSURE: 132 MMHG | WEIGHT: 250 LBS | TEMPERATURE: 97 F | DIASTOLIC BLOOD PRESSURE: 86 MMHG | RESPIRATION RATE: 16 BRPM

## 2022-07-19 DIAGNOSIS — J06.9 VIRAL URI: Primary | ICD-10-CM

## 2022-07-19 PROCEDURE — 99213 OFFICE O/P EST LOW 20 MIN: CPT | Performed by: NURSE PRACTITIONER

## 2022-07-19 PROCEDURE — 3079F DIAST BP 80-89 MM HG: CPT | Performed by: NURSE PRACTITIONER

## 2022-07-19 PROCEDURE — 3075F SYST BP GE 130 - 139MM HG: CPT | Performed by: NURSE PRACTITIONER

## 2022-07-20 LAB — SARS-COV-2 RNA RESP QL NAA+PROBE: DETECTED

## 2022-07-26 ENCOUNTER — LAB ENCOUNTER (OUTPATIENT)
Dept: LAB | Age: 52
End: 2022-07-26
Attending: STUDENT IN AN ORGANIZED HEALTH CARE EDUCATION/TRAINING PROGRAM
Payer: COMMERCIAL

## 2022-07-26 DIAGNOSIS — R79.89 LOW TESTOSTERONE IN MALE: ICD-10-CM

## 2022-07-26 DIAGNOSIS — E55.9 VITAMIN D DEFICIENCY: ICD-10-CM

## 2022-07-26 DIAGNOSIS — E03.9 HYPOTHYROIDISM, UNSPECIFIED TYPE: ICD-10-CM

## 2022-07-26 LAB
ERYTHROCYTE [DISTWIDTH] IN BLOOD BY AUTOMATED COUNT: 13.4 %
ESTRADIOL SERPL-MCNC: 31.1 PG/ML
HCT VFR BLD AUTO: 53.4 %
HGB BLD-MCNC: 17.6 G/DL
MCH RBC QN AUTO: 31.5 PG (ref 26–34)
MCHC RBC AUTO-ENTMCNC: 33 G/DL (ref 31–37)
MCV RBC AUTO: 95.7 FL
PLATELET # BLD AUTO: 241 10(3)UL (ref 150–450)
RBC # BLD AUTO: 5.58 X10(6)UL
T4 FREE SERPL-MCNC: 0.7 NG/DL (ref 0.8–1.7)
TSI SER-ACNC: 3.36 MIU/ML (ref 0.36–3.74)
VIT D+METAB SERPL-MCNC: 31.3 NG/ML (ref 30–100)
WBC # BLD AUTO: 6.7 X10(3) UL (ref 4–11)

## 2022-07-26 PROCEDURE — 84402 ASSAY OF FREE TESTOSTERONE: CPT

## 2022-07-26 PROCEDURE — 84403 ASSAY OF TOTAL TESTOSTERONE: CPT

## 2022-07-26 PROCEDURE — 82670 ASSAY OF TOTAL ESTRADIOL: CPT

## 2022-07-26 PROCEDURE — 85027 COMPLETE CBC AUTOMATED: CPT

## 2022-07-26 PROCEDURE — 36415 COLL VENOUS BLD VENIPUNCTURE: CPT

## 2022-07-26 PROCEDURE — 82306 VITAMIN D 25 HYDROXY: CPT

## 2022-07-26 PROCEDURE — 84443 ASSAY THYROID STIM HORMONE: CPT

## 2022-07-26 PROCEDURE — 84439 ASSAY OF FREE THYROXINE: CPT

## 2022-08-01 ENCOUNTER — OFFICE VISIT (OUTPATIENT)
Dept: ENDOCRINOLOGY CLINIC | Facility: CLINIC | Age: 52
End: 2022-08-01
Payer: COMMERCIAL

## 2022-08-01 ENCOUNTER — PATIENT MESSAGE (OUTPATIENT)
Dept: ENDOCRINOLOGY CLINIC | Facility: CLINIC | Age: 52
End: 2022-08-01

## 2022-08-01 ENCOUNTER — LAB ENCOUNTER (OUTPATIENT)
Dept: LAB | Age: 52
End: 2022-08-01
Attending: STUDENT IN AN ORGANIZED HEALTH CARE EDUCATION/TRAINING PROGRAM
Payer: COMMERCIAL

## 2022-08-01 VITALS
BODY MASS INDEX: 36 KG/M2 | WEIGHT: 248.63 LBS | SYSTOLIC BLOOD PRESSURE: 132 MMHG | HEART RATE: 91 BPM | DIASTOLIC BLOOD PRESSURE: 88 MMHG

## 2022-08-01 DIAGNOSIS — E03.9 HYPOTHYROIDISM, UNSPECIFIED TYPE: ICD-10-CM

## 2022-08-01 DIAGNOSIS — E21.0 PRIMARY HYPERPARATHYROIDISM (HCC): ICD-10-CM

## 2022-08-01 DIAGNOSIS — E21.0 PRIMARY HYPERPARATHYROIDISM (HCC): Primary | ICD-10-CM

## 2022-08-01 DIAGNOSIS — R79.89 LOW TESTOSTERONE IN MALE: ICD-10-CM

## 2022-08-01 LAB
CALCIUM BLD-MCNC: 11.1 MG/DL (ref 8.5–10.1)
PTH-INTACT SERPL-MCNC: 143.9 PG/ML (ref 18.5–88)

## 2022-08-01 PROCEDURE — 36415 COLL VENOUS BLD VENIPUNCTURE: CPT

## 2022-08-01 PROCEDURE — 83970 ASSAY OF PARATHORMONE: CPT

## 2022-08-01 PROCEDURE — 99215 OFFICE O/P EST HI 40 MIN: CPT | Performed by: STUDENT IN AN ORGANIZED HEALTH CARE EDUCATION/TRAINING PROGRAM

## 2022-08-01 PROCEDURE — 3079F DIAST BP 80-89 MM HG: CPT | Performed by: STUDENT IN AN ORGANIZED HEALTH CARE EDUCATION/TRAINING PROGRAM

## 2022-08-01 PROCEDURE — 3075F SYST BP GE 130 - 139MM HG: CPT | Performed by: STUDENT IN AN ORGANIZED HEALTH CARE EDUCATION/TRAINING PROGRAM

## 2022-08-01 PROCEDURE — 82310 ASSAY OF CALCIUM: CPT

## 2022-08-01 RX ORDER — LEVOTHYROXINE AND LIOTHYRONINE 19; 4.5 UG/1; UG/1
TABLET ORAL
Qty: 240 TABLET | Refills: 0 | Status: SHIPPED | OUTPATIENT
Start: 2022-08-01

## 2022-08-01 RX ORDER — TESTOSTERONE CYPIONATE 200 MG/ML
100 INJECTION INTRAMUSCULAR
Qty: 16 ML | Refills: 0 | Status: SHIPPED | OUTPATIENT
Start: 2022-08-01

## 2022-08-01 NOTE — PATIENT INSTRUCTIONS
Refill teststoerone at current dose  Refill syringe after your message to us  [facility] - 30mg and 15mg tablets - alternate 45mg + 60mg and repeat TFTs in 2 months  HyperCa/hyperPTH - repeat again; if medically managing, repeat every 6 months; if surgically managing, then next step is ENT consultation

## 2022-08-02 RX ORDER — SYRINGE AND NEEDLE,INSULIN,1ML 25GX1"
SYRINGE, EMPTY DISPOSABLE MISCELLANEOUS
Qty: 100 EACH | Refills: 0 | Status: SHIPPED | OUTPATIENT
Start: 2022-08-02

## 2022-08-02 NOTE — TELEPHONE ENCOUNTER
Spoke with patient on telephone. Reviewed needles are 25g x 1in. To be sent to [facility].     Rx sent per Dr. Johnson Record written note. Detail Level: Detailed General Sunscreen Counseling: I recommended a broad spectrum sunscreen with a SPF of 30 or higher.  I explained that SPF 30 sunscreens block approximately 97 percent of the sun's harmful rays.  Sunscreens should be applied at least 15 minutes prior to expected sun exposure and then every 2 hours after that as long as sun exposure continues. If swimming or exercising sunscreen should be reapplied every 45 minutes to an hour after getting wet or sweating.  One ounce, or the equivalent of a shot glass full of sunscreen, is adequate to protect the skin not covered by a bathing suit. I also recommended a lip balm with a sunscreen as well. Sun protective clothing can be used in lieu of sunscreen but must be worn the entire time you are exposed to the sun's rays.\\n\\nThe ABCDEs of melanoma were reviewed with the patient, and the importance of monthly self-examination of moles was emphasized.  Should any moles change in shape or color, or itch, bleed or burn, pt will contact our office for evaluation sooner then their interval appointment.

## 2022-08-03 NOTE — TELEPHONE ENCOUNTER
[employer] message sent to patient reviewing Dr. Leona Rodriguez response regarding article patient had sent by [address] Will close this encounter.

## 2022-08-07 LAB
SEX HORMONE BINDING GLOBULIN: 27 NMOL/L
TESTOSTERONE -MS, BIOAVAILAB: 534 NG/DL
TESTOSTERONE, -MS/MS: 845 NG/DL
TESTOSTERONE, FREE -MS/MS: 191.5 PG/ML

## 2022-08-08 DIAGNOSIS — R79.89 LOW TESTOSTERONE IN MALE: Primary | ICD-10-CM

## 2022-09-12 RX ORDER — THYROID 30 MG/1
TABLET ORAL
Qty: 240 TABLET | Refills: 0 | Status: SHIPPED | OUTPATIENT
Start: 2022-09-12

## 2022-09-12 NOTE — TELEPHONE ENCOUNTER
LOV 8/1/22    Future Appointments   Date Time Provider Jose C Chong   1/30/2023  2:20 PM Cici Hawk MD [facility] [place]

## 2022-10-10 ENCOUNTER — LAB ENCOUNTER (OUTPATIENT)
Dept: LAB | Age: 52
End: 2022-10-10
Attending: STUDENT IN AN ORGANIZED HEALTH CARE EDUCATION/TRAINING PROGRAM
Payer: COMMERCIAL

## 2022-10-10 DIAGNOSIS — E03.9 HYPOTHYROIDISM, UNSPECIFIED TYPE: ICD-10-CM

## 2022-10-10 DIAGNOSIS — E78.00 HYPERCHOLESTEREMIA: ICD-10-CM

## 2022-10-10 LAB
EST. AVERAGE GLUCOSE BLD GHB EST-MCNC: 91 MG/DL (ref 68–126)
HBA1C MFR BLD: 4.8 % (ref ?–5.7)
T4 FREE SERPL-MCNC: 0.7 NG/DL (ref 0.8–1.7)
TSI SER-ACNC: 2.18 MIU/ML (ref 0.36–3.74)

## 2022-10-10 PROCEDURE — 84439 ASSAY OF FREE THYROXINE: CPT

## 2022-10-10 PROCEDURE — 84443 ASSAY THYROID STIM HORMONE: CPT

## 2022-10-10 PROCEDURE — 36415 COLL VENOUS BLD VENIPUNCTURE: CPT

## 2022-10-10 PROCEDURE — 83036 HEMOGLOBIN GLYCOSYLATED A1C: CPT

## 2022-10-11 RX ORDER — THYROID,PORK 15 MG
TABLET ORAL
Qty: 90 TABLET | Refills: 0 | Status: SHIPPED | OUTPATIENT
Start: 2022-10-11

## 2022-10-11 NOTE — TELEPHONE ENCOUNTER
LOV 8/1/22    Future Appointments   Date Time Provider Jose C Arreguini   11/7/2022  8:00 AM Claire Echevarria MD [facility]   1/30/2023  2:20 PM Teresa Watson MD [facility] [place]

## 2022-10-21 ENCOUNTER — TELEPHONE (OUTPATIENT)
Dept: ENDOCRINOLOGY CLINIC | Facility: CLINIC | Age: 52
End: 2022-10-21

## 2022-10-21 NOTE — TELEPHONE ENCOUNTER
Spoke with pt via phone. Verified name and . Reinforced Dr. Jonnie Carrillo message from 10/11 to not increase dose of armour at this time. She want to check T3 with next set of labs in 2 weeks. Will forward to her for her review. Pt staes he gets extremely tired  In the afternoon and needs to drink coffee and red bull to stay awake.  Pt will wait for further direction on Monday

## 2022-10-21 NOTE — TELEPHONE ENCOUNTER
Patient phoned office stating he is still feeling tired and wondering if he could increase his thyroid medication from 60 mg to 75 mg(states he currently has some 15mg tablets). I advised patient that Dr. Tito Osgood is out of the office today and returning Monday. Patient wants to know today because he would like to make the adjustment over the weekend and not while at work. Again, I reiterated that  Is out today but that I would forward to clinical team and have someone touch base with him today.

## 2022-11-02 ENCOUNTER — LAB ENCOUNTER (OUTPATIENT)
Dept: LAB | Age: 52
End: 2022-11-02
Attending: STUDENT IN AN ORGANIZED HEALTH CARE EDUCATION/TRAINING PROGRAM
Payer: COMMERCIAL

## 2022-11-02 DIAGNOSIS — E03.9 HYPOTHYROIDISM, UNSPECIFIED TYPE: ICD-10-CM

## 2022-11-02 LAB
T3 SERPL-MCNC: 131 NG/DL (ref 60–181)
T4 FREE SERPL-MCNC: 0.7 NG/DL (ref 0.8–1.7)
TSI SER-ACNC: 2.24 MIU/ML (ref 0.36–3.74)

## 2022-11-02 PROCEDURE — 84443 ASSAY THYROID STIM HORMONE: CPT

## 2022-11-02 PROCEDURE — 36415 COLL VENOUS BLD VENIPUNCTURE: CPT

## 2022-11-02 PROCEDURE — 84480 ASSAY TRIIODOTHYRONINE (T3): CPT

## 2022-11-02 PROCEDURE — 84439 ASSAY OF FREE THYROXINE: CPT

## 2022-11-03 DIAGNOSIS — E03.9 HYPOTHYROIDISM, UNSPECIFIED TYPE: Primary | ICD-10-CM

## 2022-11-07 PROBLEM — F32.A ANXIETY AND DEPRESSION: Status: ACTIVE | Noted: 2022-11-07

## 2022-11-07 PROBLEM — F41.9 ANXIETY AND DEPRESSION: Status: ACTIVE | Noted: 2022-11-07

## 2022-11-28 DIAGNOSIS — R79.89 LOW TESTOSTERONE IN MALE: ICD-10-CM

## 2022-11-28 RX ORDER — TESTOSTERONE CYPIONATE 200 MG/ML
100 INJECTION INTRAMUSCULAR
Qty: 16 ML | Refills: 0 | Status: SHIPPED | OUTPATIENT
Start: 2022-11-28

## 2022-11-28 NOTE — TELEPHONE ENCOUNTER
LOV 8/1/22    Future Appointments   Date Time Provider Jose C Arreguini   1/27/2023  7:40 AM Anurag Swann MD [facility]   1/30/2023  2:20 PM Didier Jarquin MD [facility] [place]   3/22/2023  9:00 AM Rene Baires MD [facility]     Last testosterone and CBC done 7/26.  Orders are in the system for repeat labs to have done prior to next appointment

## 2022-12-05 RX ORDER — LEVOTHYROXINE AND LIOTHYRONINE 19; 4.5 UG/1; UG/1
TABLET ORAL
Qty: 70 TABLET | Refills: 0 | Status: SHIPPED | OUTPATIENT
Start: 2022-12-05

## 2022-12-05 NOTE — TELEPHONE ENCOUNTER
LOV: 11    FU: scheduled 23    Last Refill: 22    Month Supply [place] month    Last labs done 22. Patient alternating every other day 75mg, 60mg. Repeat labs in 6 weeks ( around ). Phoned patient and reviewed he is due for repeat labs- confirmed he will get done next week.

## 2022-12-12 ENCOUNTER — TELEPHONE (OUTPATIENT)
Dept: ENDOCRINOLOGY CLINIC | Facility: CLINIC | Age: 52
End: 2022-12-12

## 2022-12-12 ENCOUNTER — LAB ENCOUNTER (OUTPATIENT)
Dept: LAB | Age: 52
End: 2022-12-12
Attending: STUDENT IN AN ORGANIZED HEALTH CARE EDUCATION/TRAINING PROGRAM
Payer: COMMERCIAL

## 2022-12-12 DIAGNOSIS — E03.9 HYPOTHYROIDISM, UNSPECIFIED TYPE: ICD-10-CM

## 2022-12-12 LAB
T4 FREE SERPL-MCNC: 0.7 NG/DL (ref 0.8–1.7)
TSI SER-ACNC: 3.29 MIU/ML (ref 0.36–3.74)

## 2022-12-12 PROCEDURE — 36415 COLL VENOUS BLD VENIPUNCTURE: CPT

## 2022-12-12 PROCEDURE — 84439 ASSAY OF FREE THYROXINE: CPT

## 2022-12-12 PROCEDURE — 84443 ASSAY THYROID STIM HORMONE: CPT

## 2022-12-12 RX ORDER — LEVOTHYROXINE AND LIOTHYRONINE 38; 9 UG/1; UG/1
60 TABLET ORAL DAILY
Qty: 90 TABLET | Refills: 0 | Status: SHIPPED | OUTPATIENT
Start: 2022-12-12

## 2022-12-12 RX ORDER — LEVOTHYROXINE AND LIOTHYRONINE 9.5; 2.25 UG/1; UG/1
15 TABLET ORAL DAILY
Qty: 90 TABLET | Refills: 0 | Status: SHIPPED | OUTPATIENT
Start: 2022-12-12

## 2022-12-12 NOTE — TELEPHONE ENCOUNTER
Pt need refill on [place] 15 mg and wants a new RX for 60mg. New order is for 75mg daily. Order routed to Dr. Shameka Valiente for signature.

## 2022-12-22 ENCOUNTER — HOSPITAL ENCOUNTER (OUTPATIENT)
Dept: MRI IMAGING | Facility: HOSPITAL | Age: 52
Discharge: HOME | End: 2022-12-22
Attending: FAMILY MEDICINE
Payer: COMMERCIAL

## 2022-12-22 ENCOUNTER — OFFICE VISIT (OUTPATIENT)
Dept: INTERNAL MEDICINE CLINIC | Facility: CLINIC | Age: 52
End: 2022-12-22
Payer: COMMERCIAL

## 2022-12-22 ENCOUNTER — HOSPITAL ENCOUNTER (OUTPATIENT)
Dept: GENERAL RADIOLOGY | Facility: HOSPITAL | Age: 52
Discharge: HOME | End: 2022-12-22
Attending: FAMILY MEDICINE
Payer: COMMERCIAL

## 2022-12-22 VITALS
HEART RATE: 82 BPM | DIASTOLIC BLOOD PRESSURE: 84 MMHG | SYSTOLIC BLOOD PRESSURE: 132 MMHG | OXYGEN SATURATION: 99 % | WEIGHT: 252.63 LBS | RESPIRATION RATE: 18 BRPM | BODY MASS INDEX: 36 KG/M2

## 2022-12-22 DIAGNOSIS — S46.209A INJURY OF TENDON OF BICEPS: ICD-10-CM

## 2022-12-22 DIAGNOSIS — G89.29 CHRONIC PAIN IN RIGHT SHOULDER: ICD-10-CM

## 2022-12-22 DIAGNOSIS — S49.92XA INJURY OF LEFT SHOULDER, INITIAL ENCOUNTER: ICD-10-CM

## 2022-12-22 DIAGNOSIS — M25.511 CHRONIC PAIN IN RIGHT SHOULDER: ICD-10-CM

## 2022-12-22 DIAGNOSIS — M25.511 CHRONIC PAIN IN RIGHT SHOULDER: Primary | ICD-10-CM

## 2022-12-22 DIAGNOSIS — G89.29 CHRONIC PAIN IN RIGHT SHOULDER: Primary | ICD-10-CM

## 2022-12-22 PROCEDURE — 3075F SYST BP GE 130 - 139MM HG: CPT | Performed by: FAMILY MEDICINE

## 2022-12-22 PROCEDURE — 73030 X-RAY EXAM OF SHOULDER: CPT | Performed by: FAMILY MEDICINE

## 2022-12-22 PROCEDURE — 3079F DIAST BP 80-89 MM HG: CPT | Performed by: FAMILY MEDICINE

## 2022-12-22 PROCEDURE — 73221 MRI JOINT UPR EXTREM W/O DYE: CPT | Performed by: FAMILY MEDICINE

## 2022-12-22 PROCEDURE — 99214 OFFICE O/P EST MOD 30 MIN: CPT | Performed by: FAMILY MEDICINE

## 2022-12-23 DIAGNOSIS — S46.219A RUPTURE OF BICEPS TENDON, UNSPECIFIED LATERALITY, INITIAL ENCOUNTER: ICD-10-CM

## 2022-12-23 DIAGNOSIS — S49.92XA INJURY OF LEFT SHOULDER, INITIAL ENCOUNTER: Primary | ICD-10-CM

## 2022-12-27 ENCOUNTER — TELEPHONE (OUTPATIENT)
Dept: ORTHOPEDICS CLINIC | Facility: CLINIC | Age: 52
End: 2022-12-27

## 2022-12-27 ENCOUNTER — TELEPHONE (OUTPATIENT)
Dept: PHYSICAL THERAPY | Facility: HOSPITAL | Age: 52
End: 2022-12-27

## 2022-12-27 NOTE — TELEPHONE ENCOUNTER
Patient is scheduled with Dr. Tj Sandhu for a right shoulder and right bicep tear. Please advise if imaging is needed.

## 2022-12-27 NOTE — TELEPHONE ENCOUNTER
Last Imaging  MRI SHOULDER, RIGHT (LZT=34484)  Narrative: PROCEDURE:  MRI SHOULDER, RIGHT (CPT=73221)     LOCATION:  [facility]       COMPARISON:  None. INDICATIONS:  S46.209A Injury of tendon of biceps S49. 92XA Injury of left shoulder, initial encounter G89.29 Chronic pain in right shoulder M25.511 Chronic pain in right moshe*     TECHNIQUE:  Multiplanar imaging of the shoulder including oblique coronal, axial and sagittal imaging was acquired including proton density fat suppression technique. Images were performed without intravenous gadolinium. PATIENT STATED HISTORY: (As transcribed by Technologist)  The patient stated he has been experiencing right shoulder pain for about 6 months. FINDINGS:    ROTATOR CUFF:  There is a small partial thickness tear measuring 4 mm involving the undersurface of supraspinatus tendon. There is mild tendinosis within the supraspinatus tendon. The infraspinatus tendon demonstrates mild thickening with increased T2   signal consistent with tendinosis. The subscapularis tendon is grossly unremarkable. There is small amount of fluid within the subacromial/subdeltoid bursa. MUSCULATURE:  No strain, edema, or atrophy. AC JOINT/ACROMION:  Mild to moderate degenerative changes of the acromioclavicular joint. .  Normal marrow and cortical signal. Type I acromion. Normal subacromial space without evidence of subacromial impingement. BICEPS/LABRAL COMPLEX:  Long head of the biceps tendon is very attenuated within the bicipital groove and is not visualized within the intra-articular portion is likely torn. There is degenerative fraying involving the superior and anterior labrum   without sujey tear. GLENOHUMERAL JOINT:  Subcortical cysts noted within the region of the greater tuberosity likely due to enthesopathy from the rotator cuff insertion. There is mild degenerative changes of the glenohumeral joint.   There is mild thickening of the inferior   glenohumeral ligament. There is a trace joint effusion without significant synovitis. Impression: CONCLUSION:       1. Small partial thickness tear involving less than 25% thickness of the supraspinatus tendon. .     2. Mild tendinosis of supraspinatus and infraspinatus tendons. 3. Full-thickness tear of the intra-articular biceps tendon. 4. Mild degenerative changes of the acromioclavicular joint and glenohumeral joint.         Dictated by (CST): Angie Blakely MD on 12/23/2022 at 8:19 AM       Finalized by (CST): Angie Blakely MD on 12/23/2022 at 8:30 AM          Future Appointments   Date Time Provider Jose C Chong   12/28/2022  7:45 AM Nery Pimentel, PT [facility] PHYS [address]   12/30/2022  7:00 AM Carrie Ackerman MD [id number]   1/4/2023  8:30 AM Liliya Miller, PT [facility] PHYS [address]   1/27/2023  7:40 AM Eugenia Ewing MD [facility]   1/30/2023  2:20 PM Isaías De Luna MD [facility] [place]   3/22/2023  9:00 AM Catarina Steen MD [facility]

## 2022-12-28 ENCOUNTER — OFFICE VISIT (OUTPATIENT)
Dept: PHYSICAL THERAPY | Facility: HOSPITAL | Age: 52
End: 2022-12-28
Attending: FAMILY MEDICINE
Payer: COMMERCIAL

## 2022-12-28 DIAGNOSIS — S49.92XA INJURY OF LEFT SHOULDER, INITIAL ENCOUNTER: ICD-10-CM

## 2022-12-28 DIAGNOSIS — S46.219A RUPTURE OF BICEPS TENDON, UNSPECIFIED LATERALITY, INITIAL ENCOUNTER: ICD-10-CM

## 2022-12-28 PROCEDURE — 97110 THERAPEUTIC EXERCISES: CPT

## 2022-12-28 PROCEDURE — 97162 PT EVAL MOD COMPLEX 30 MIN: CPT

## 2022-12-30 ENCOUNTER — OFFICE VISIT (OUTPATIENT)
Dept: ORTHOPEDICS CLINIC | Facility: CLINIC | Age: 52
End: 2022-12-30
Payer: COMMERCIAL

## 2022-12-30 ENCOUNTER — TELEPHONE (OUTPATIENT)
Dept: ORTHOPEDICS CLINIC | Facility: CLINIC | Age: 52
End: 2022-12-30

## 2022-12-30 ENCOUNTER — TELEPHONE (OUTPATIENT)
Dept: INTERNAL MEDICINE CLINIC | Facility: CLINIC | Age: 52
End: 2022-12-30

## 2022-12-30 VITALS — HEIGHT: 70 IN | BODY MASS INDEX: 34.36 KG/M2 | WEIGHT: 240 LBS

## 2022-12-30 DIAGNOSIS — S46.011A TRAUMATIC INCOMPLETE TEAR OF RIGHT ROTATOR CUFF, INITIAL ENCOUNTER: ICD-10-CM

## 2022-12-30 DIAGNOSIS — S46.211A RUPTURE OF RIGHT PROXIMAL BICEPS TENDON, INITIAL ENCOUNTER: Primary | ICD-10-CM

## 2022-12-30 DIAGNOSIS — M75.41 SUBACROMIAL IMPINGEMENT OF RIGHT SHOULDER: ICD-10-CM

## 2022-12-30 DIAGNOSIS — M24.111 DEGENERATIVE TEAR OF GLENOID LABRUM OF RIGHT SHOULDER: ICD-10-CM

## 2022-12-30 PROCEDURE — 99205 OFFICE O/P NEW HI 60 MIN: CPT | Performed by: ORTHOPAEDIC SURGERY

## 2022-12-30 PROCEDURE — 3008F BODY MASS INDEX DOCD: CPT | Performed by: ORTHOPAEDIC SURGERY

## 2022-12-30 NOTE — TELEPHONE ENCOUNTER
Pt having surgery on bicep and shoulder with Dr. Vanessa Piedra on 1/19. Pt scheduled on below for pre-op. pw in red folder.      Future Appointments   Date Time Provider Jose C Chong   1/6/2023  1:40 PM Dale Haskins MD [facility]

## 2022-12-30 NOTE — PROGRESS NOTES
OR BOOKING SHEET SHOULDER  Name: Jose Enrique White  MRN: [id number]   : 1970  Diagnosis:  [x] Rupture of right proximal biceps tendon, initial encounter [S46.211A]  Disposition:    [x] Ambulatory  [] Overnight for JAYLIN  [] Overnight for observation and pain control  [] Inpatient procedure    Operative Time Required:  2.5 hours  Antibiotics: 2 g cefazolin within 60 minutes of surgical incision  Procedure:   Laterality: [x] RIGHT [] LEFT                  [] BILATERAL  Procedures:   [x] Shoulder Arthroscopy  [] Arthrotomy (30305)  [] Arthoscopy/Arthrotomy  [x] Subpectoral open Biceps Tenodesis (27727)  [x] Subacromial Decompression (61462)  [x] SLAP repair (29737)      [] Lysis of Adhesions / Manipulation under Anesthesia (40873)  [] Removal of Loose Body (19206)  [] Biceps tenotomy (21167)  [] ORIF clavicle (60577)  [] ORIF Proximal Humerus (02968)  [] GWEN clavicle (13259)  [] CC ligament reconstruction (34104)   [] Graft Hamstring Auto (21811)  [] Cadaver: Hamstring allograft   [] Total Shoulder Arthroplasty (59184)   [] Reverse Shoulder Arthroplasty (57855)   [] LIPOGEMS (80948)     Additional info:   [x] PCP Clearance Needed  [] MRSA  [] C-Arm  [x] TXA at time surgery  [x] Physical Therapy [address] [place]   [x] DME Rx Needed  [] Appt with Dr. Bob Allen needed  Implants needed: Arthrex  Positioning Equipment: Khushbu Vega

## 2023-01-04 ENCOUNTER — APPOINTMENT (OUTPATIENT)
Dept: PHYSICAL THERAPY | Facility: HOSPITAL | Age: 53
End: 2023-01-04
Attending: FAMILY MEDICINE
Payer: COMMERCIAL

## 2023-01-05 ENCOUNTER — TELEPHONE (OUTPATIENT)
Dept: PHYSICAL THERAPY | Facility: HOSPITAL | Age: 53
End: 2023-01-05

## 2023-01-06 ENCOUNTER — OFFICE VISIT (OUTPATIENT)
Dept: INTERNAL MEDICINE CLINIC | Facility: CLINIC | Age: 53
End: 2023-01-06
Payer: COMMERCIAL

## 2023-01-06 VITALS
HEART RATE: 82 BPM | HEIGHT: 70 IN | SYSTOLIC BLOOD PRESSURE: 122 MMHG | WEIGHT: 252.63 LBS | RESPIRATION RATE: 18 BRPM | OXYGEN SATURATION: 96 % | BODY MASS INDEX: 36.17 KG/M2 | DIASTOLIC BLOOD PRESSURE: 82 MMHG

## 2023-01-06 DIAGNOSIS — Z01.818 PREOPERATIVE EXAMINATION: Primary | ICD-10-CM

## 2023-01-06 DIAGNOSIS — I25.84 CORONARY ARTERY CALCIFICATION: ICD-10-CM

## 2023-01-06 DIAGNOSIS — E78.00 HYPERCHOLESTEREMIA: ICD-10-CM

## 2023-01-06 DIAGNOSIS — I25.10 CORONARY ARTERY CALCIFICATION: ICD-10-CM

## 2023-01-06 DIAGNOSIS — S46.211D RUPTURE OF RIGHT BICEPS TENDON, SUBSEQUENT ENCOUNTER: ICD-10-CM

## 2023-01-06 DIAGNOSIS — E03.9 HYPOTHYROIDISM, UNSPECIFIED TYPE: ICD-10-CM

## 2023-01-06 LAB
ATRIAL RATE: 82 BPM
P AXIS: 26 DEGREES
P-R INTERVAL: 148 MS
Q-T INTERVAL: 336 MS
QRS DURATION: 92 MS
QTC CALCULATION (BEZET): 392 MS
R AXIS: 24 DEGREES
T AXIS: 5 DEGREES
VENTRICULAR RATE: 82 BPM

## 2023-01-06 PROCEDURE — 3008F BODY MASS INDEX DOCD: CPT | Performed by: FAMILY MEDICINE

## 2023-01-06 PROCEDURE — 3079F DIAST BP 80-89 MM HG: CPT | Performed by: FAMILY MEDICINE

## 2023-01-06 PROCEDURE — 93000 ELECTROCARDIOGRAM COMPLETE: CPT | Performed by: FAMILY MEDICINE

## 2023-01-06 PROCEDURE — 3074F SYST BP LT 130 MM HG: CPT | Performed by: FAMILY MEDICINE

## 2023-01-06 PROCEDURE — 99244 OFF/OP CNSLTJ NEW/EST MOD 40: CPT | Performed by: FAMILY MEDICINE

## 2023-01-13 ENCOUNTER — LAB ENCOUNTER (OUTPATIENT)
Dept: LAB | Age: 53
End: 2023-01-13
Attending: STUDENT IN AN ORGANIZED HEALTH CARE EDUCATION/TRAINING PROGRAM
Payer: COMMERCIAL

## 2023-01-13 DIAGNOSIS — R79.89 LOW TESTOSTERONE IN MALE: ICD-10-CM

## 2023-01-13 LAB
ERYTHROCYTE [DISTWIDTH] IN BLOOD BY AUTOMATED COUNT: 13.2 %
HCT VFR BLD AUTO: 54.7 %
HGB BLD-MCNC: 18.3 G/DL
MCH RBC QN AUTO: 31.9 PG (ref 26–34)
MCHC RBC AUTO-ENTMCNC: 33.5 G/DL (ref 31–37)
MCV RBC AUTO: 95.3 FL
PLATELET # BLD AUTO: 250 10(3)UL (ref 150–450)
PSA SERPL-MCNC: 1.86 NG/ML (ref ?–4)
RBC # BLD AUTO: 5.74 X10(6)UL
TESTOST SERPL-MCNC: 838.72 NG/DL
WBC # BLD AUTO: 7.2 X10(3) UL (ref 4–11)

## 2023-01-13 PROCEDURE — 84403 ASSAY OF TOTAL TESTOSTERONE: CPT

## 2023-01-13 PROCEDURE — 84153 ASSAY OF PSA TOTAL: CPT

## 2023-01-13 PROCEDURE — 85027 COMPLETE CBC AUTOMATED: CPT

## 2023-01-15 ENCOUNTER — PATIENT MESSAGE (OUTPATIENT)
Facility: CLINIC | Age: 53
End: 2023-01-15

## 2023-01-15 DIAGNOSIS — R79.89 LOW TESTOSTERONE IN MALE: Primary | ICD-10-CM

## 2023-01-16 DIAGNOSIS — E03.9 HYPOTHYROIDISM, UNSPECIFIED TYPE: Primary | ICD-10-CM

## 2023-01-16 RX ORDER — TRAMADOL HYDROCHLORIDE 50 MG/1
TABLET ORAL
Qty: 20 TABLET | Refills: 1 | Status: SHIPPED | OUTPATIENT
Start: 2023-01-16 | End: 2023-02-20

## 2023-01-16 RX ORDER — ONDANSETRON 4 MG/1
TABLET, FILM COATED ORAL
Qty: 8 TABLET | Refills: 0 | Status: SHIPPED | OUTPATIENT
Start: 2023-01-16 | End: 2023-01-27

## 2023-01-16 NOTE — TELEPHONE ENCOUNTER
From: Bandar Orona  To: Sylvia Dee MD  Sent: 1/15/2023 2:25 PM CST  Subject: Blood work    Was a thyroid test ordered, I have not seen results yet.

## 2023-01-16 NOTE — TELEPHONE ENCOUNTER
Spoke with pt via phone. Verified name and . Informed pt to have repeat thyroid labs drawn before his appointment this month on . Pt will try to have them done but is having surgery this week on his shoulder. Explained to pt he should be mobile but may need a ride to the lab. No further questions.

## 2023-01-16 NOTE — TELEPHONE ENCOUNTER
Regarding: Blood work  ----- Message from Jaya Contreras RN sent at 1/16/2023  8:38 AM CST -----  Last labs 12/12, When do  you want him to repeat these     ----- Message sent from Tl Eid RN to Deric Cortez at 1/16/2023  8:37 AM -----   Chinedu Collado  The last thyroid labs you did was 12/12. I'll ask Dr Reese Sutherland if she wants to repeat these now. Roz Arias RN      ----- Message -----       From:Hany Fontanez       Sent:1/15/2023  2:25 PM Antionette Hawk MD    Subject:Blood work    Was a thyroid test ordered, I have not seen results yet.

## 2023-01-18 ENCOUNTER — ANESTHESIA EVENT (OUTPATIENT)
Dept: SURGERY | Facility: HOSPITAL | Age: 53
End: 2023-01-18
Payer: COMMERCIAL

## 2023-01-19 ENCOUNTER — HOSPITAL ENCOUNTER (OUTPATIENT)
Facility: HOSPITAL | Age: 53
Setting detail: HOSPITAL OUTPATIENT SURGERY
Discharge: HOME | End: 2023-01-19
Attending: ORTHOPAEDIC SURGERY | Admitting: ORTHOPAEDIC SURGERY
Payer: COMMERCIAL

## 2023-01-19 ENCOUNTER — ANESTHESIA (OUTPATIENT)
Dept: SURGERY | Facility: HOSPITAL | Age: 53
End: 2023-01-19
Payer: COMMERCIAL

## 2023-01-19 VITALS
RESPIRATION RATE: 18 BRPM | HEIGHT: 70 IN | OXYGEN SATURATION: 93 % | TEMPERATURE: 97 F | SYSTOLIC BLOOD PRESSURE: 140 MMHG | HEART RATE: 87 BPM | BODY MASS INDEX: 36.08 KG/M2 | WEIGHT: 252 LBS | DIASTOLIC BLOOD PRESSURE: 79 MMHG

## 2023-01-19 PROCEDURE — 0LS34ZZ REPOSITION RIGHT UPPER ARM TENDON, PERCUTANEOUS ENDOSCOPIC APPROACH: ICD-10-PCS | Performed by: ORTHOPAEDIC SURGERY

## 2023-01-19 PROCEDURE — 76942 ECHO GUIDE FOR BIOPSY: CPT | Performed by: ANESTHESIOLOGY

## 2023-01-19 PROCEDURE — 0RNJ4ZZ RELEASE RIGHT SHOULDER JOINT, PERCUTANEOUS ENDOSCOPIC APPROACH: ICD-10-PCS | Performed by: ORTHOPAEDIC SURGERY

## 2023-01-19 DEVICE — PROXIMAL TENODESIS IMPLANT SYSTEM REV: 0
Type: IMPLANTABLE DEVICE | Site: SHOULDER | Status: FUNCTIONAL
Brand: ARTHREX®

## 2023-01-19 DEVICE — FIBERTAK RC, DOUBLOAD TAPE BL/W, BLK/W
Type: IMPLANTABLE DEVICE | Site: SHOULDER | Status: FUNCTIONAL
Brand: ARTHREX®

## 2023-01-19 RX ORDER — LIDOCAINE HYDROCHLORIDE 10 MG/ML
INJECTION, SOLUTION EPIDURAL; INFILTRATION; INTRACAUDAL; PERINEURAL AS NEEDED
Status: DISCONTINUED | OUTPATIENT
Start: 2023-01-19 | End: 2023-01-19 | Stop reason: SURG

## 2023-01-19 RX ORDER — SODIUM CHLORIDE, SODIUM LACTATE, POTASSIUM CHLORIDE, CALCIUM CHLORIDE 600; 310; 30; 20 MG/100ML; MG/100ML; MG/100ML; MG/100ML
INJECTION, SOLUTION INTRAVENOUS CONTINUOUS
Status: DISCONTINUED | OUTPATIENT
Start: 2023-01-19 | End: 2023-01-19

## 2023-01-19 RX ORDER — PROCHLORPERAZINE EDISYLATE 5 MG/ML
5 INJECTION INTRAMUSCULAR; INTRAVENOUS EVERY 8 HOURS PRN
Status: DISCONTINUED | OUTPATIENT
Start: 2023-01-19 | End: 2023-01-19

## 2023-01-19 RX ORDER — TRANEXAMIC ACID 10 MG/ML
1000 INJECTION, SOLUTION INTRAVENOUS ONCE
Status: DISCONTINUED | OUTPATIENT
Start: 2023-01-19 | End: 2023-01-19 | Stop reason: HOSPADM

## 2023-01-19 RX ORDER — KETOROLAC TROMETHAMINE 30 MG/ML
INJECTION, SOLUTION INTRAMUSCULAR; INTRAVENOUS AS NEEDED
Status: DISCONTINUED | OUTPATIENT
Start: 2023-01-19 | End: 2023-01-19 | Stop reason: SURG

## 2023-01-19 RX ORDER — HYDROCODONE BITARTRATE AND ACETAMINOPHEN 5; 325 MG/1; MG/1
2 TABLET ORAL ONCE AS NEEDED
Status: DISCONTINUED | OUTPATIENT
Start: 2023-01-19 | End: 2023-01-19

## 2023-01-19 RX ORDER — ONDANSETRON 2 MG/ML
INJECTION INTRAMUSCULAR; INTRAVENOUS AS NEEDED
Status: DISCONTINUED | OUTPATIENT
Start: 2023-01-19 | End: 2023-01-19 | Stop reason: SURG

## 2023-01-19 RX ORDER — HYDROMORPHONE HYDROCHLORIDE 1 MG/ML
0.6 INJECTION, SOLUTION INTRAMUSCULAR; INTRAVENOUS; SUBCUTANEOUS EVERY 5 MIN PRN
Status: DISCONTINUED | OUTPATIENT
Start: 2023-01-19 | End: 2023-01-19

## 2023-01-19 RX ORDER — LABETALOL HYDROCHLORIDE 5 MG/ML
5 INJECTION, SOLUTION INTRAVENOUS EVERY 5 MIN PRN
Status: DISCONTINUED | OUTPATIENT
Start: 2023-01-19 | End: 2023-01-19

## 2023-01-19 RX ORDER — ACETAMINOPHEN 500 MG
1000 TABLET ORAL ONCE
Status: DISCONTINUED | OUTPATIENT
Start: 2023-01-19 | End: 2023-01-19 | Stop reason: HOSPADM

## 2023-01-19 RX ORDER — ONDANSETRON 2 MG/ML
4 INJECTION INTRAMUSCULAR; INTRAVENOUS EVERY 6 HOURS PRN
Status: DISCONTINUED | OUTPATIENT
Start: 2023-01-19 | End: 2023-01-19

## 2023-01-19 RX ORDER — NALOXONE HYDROCHLORIDE 0.4 MG/ML
80 INJECTION, SOLUTION INTRAMUSCULAR; INTRAVENOUS; SUBCUTANEOUS AS NEEDED
Status: DISCONTINUED | OUTPATIENT
Start: 2023-01-19 | End: 2023-01-19

## 2023-01-19 RX ORDER — MIDAZOLAM HYDROCHLORIDE 1 MG/ML
1 INJECTION INTRAMUSCULAR; INTRAVENOUS EVERY 5 MIN PRN
Status: DISCONTINUED | OUTPATIENT
Start: 2023-01-19 | End: 2023-01-19

## 2023-01-19 RX ORDER — SCOLOPAMINE TRANSDERMAL SYSTEM 1 MG/1
1 PATCH, EXTENDED RELEASE TRANSDERMAL ONCE
Status: DISCONTINUED | OUTPATIENT
Start: 2023-01-19 | End: 2023-01-19 | Stop reason: HOSPADM

## 2023-01-19 RX ORDER — SODIUM CHLORIDE 9 MG/ML
INJECTION, SOLUTION INTRAVENOUS CONTINUOUS
Status: DISCONTINUED | OUTPATIENT
Start: 2023-01-19 | End: 2023-01-19

## 2023-01-19 RX ORDER — HYDROMORPHONE HYDROCHLORIDE 1 MG/ML
0.2 INJECTION, SOLUTION INTRAMUSCULAR; INTRAVENOUS; SUBCUTANEOUS EVERY 5 MIN PRN
Status: DISCONTINUED | OUTPATIENT
Start: 2023-01-19 | End: 2023-01-19

## 2023-01-19 RX ORDER — HYDROMORPHONE HYDROCHLORIDE 1 MG/ML
0.4 INJECTION, SOLUTION INTRAMUSCULAR; INTRAVENOUS; SUBCUTANEOUS EVERY 5 MIN PRN
Status: DISCONTINUED | OUTPATIENT
Start: 2023-01-19 | End: 2023-01-19

## 2023-01-19 RX ORDER — DEXAMETHASONE SODIUM PHOSPHATE 10 MG/ML
INJECTION, SOLUTION INTRAMUSCULAR; INTRAVENOUS AS NEEDED
Status: DISCONTINUED | OUTPATIENT
Start: 2023-01-19 | End: 2023-01-19 | Stop reason: SURG

## 2023-01-19 RX ORDER — CEFAZOLIN SODIUM/WATER 2 G/20 ML
2 SYRINGE (ML) INTRAVENOUS ONCE
Status: COMPLETED | OUTPATIENT
Start: 2023-01-19 | End: 2023-01-19

## 2023-01-19 RX ORDER — ACETAMINOPHEN 500 MG
1000 TABLET ORAL ONCE AS NEEDED
Status: DISCONTINUED | OUTPATIENT
Start: 2023-01-19 | End: 2023-01-19

## 2023-01-19 RX ORDER — BUPIVACAINE HYDROCHLORIDE 5 MG/ML
INJECTION, SOLUTION EPIDURAL; INTRACAUDAL AS NEEDED
Status: DISCONTINUED | OUTPATIENT
Start: 2023-01-19 | End: 2023-01-19 | Stop reason: SURG

## 2023-01-19 RX ORDER — HYDROCODONE BITARTRATE AND ACETAMINOPHEN 5; 325 MG/1; MG/1
1 TABLET ORAL ONCE AS NEEDED
Status: DISCONTINUED | OUTPATIENT
Start: 2023-01-19 | End: 2023-01-19

## 2023-01-19 RX ORDER — DEXAMETHASONE SODIUM PHOSPHATE 4 MG/ML
VIAL (ML) INJECTION AS NEEDED
Status: DISCONTINUED | OUTPATIENT
Start: 2023-01-19 | End: 2023-01-19 | Stop reason: SURG

## 2023-01-19 RX ORDER — MIDAZOLAM HYDROCHLORIDE 1 MG/ML
INJECTION INTRAMUSCULAR; INTRAVENOUS AS NEEDED
Status: DISCONTINUED | OUTPATIENT
Start: 2023-01-19 | End: 2023-01-19 | Stop reason: SURG

## 2023-01-19 RX ORDER — MEPERIDINE HYDROCHLORIDE 25 MG/ML
12.5 INJECTION INTRAMUSCULAR; INTRAVENOUS; SUBCUTANEOUS AS NEEDED
Status: DISCONTINUED | OUTPATIENT
Start: 2023-01-19 | End: 2023-01-19

## 2023-01-19 RX ADMIN — CEFAZOLIN SODIUM/WATER 2 G: 2 G/20 ML SYRINGE (ML) INTRAVENOUS at 09:58:00

## 2023-01-19 RX ADMIN — KETOROLAC TROMETHAMINE 30 MG: 30 INJECTION, SOLUTION INTRAMUSCULAR; INTRAVENOUS at 11:11:00

## 2023-01-19 RX ADMIN — BUPIVACAINE HYDROCHLORIDE 30 ML: 5 INJECTION, SOLUTION EPIDURAL; INTRACAUDAL at 09:52:00

## 2023-01-19 RX ADMIN — MIDAZOLAM HYDROCHLORIDE 2 MG: 1 INJECTION INTRAMUSCULAR; INTRAVENOUS at 09:42:00

## 2023-01-19 RX ADMIN — DEXAMETHASONE SODIUM PHOSPHATE 8 MG: 4 MG/ML VIAL (ML) INJECTION at 09:54:00

## 2023-01-19 RX ADMIN — LIDOCAINE HYDROCHLORIDE 40 MG: 10 INJECTION, SOLUTION EPIDURAL; INFILTRATION; INTRACAUDAL; PERINEURAL at 09:54:00

## 2023-01-19 RX ADMIN — DEXAMETHASONE SODIUM PHOSPHATE 2 MG: 10 INJECTION, SOLUTION INTRAMUSCULAR; INTRAVENOUS at 09:52:00

## 2023-01-19 RX ADMIN — ONDANSETRON 4 MG: 2 INJECTION INTRAMUSCULAR; INTRAVENOUS at 11:11:00

## 2023-01-19 RX ADMIN — SODIUM CHLORIDE, SODIUM LACTATE, POTASSIUM CHLORIDE, CALCIUM CHLORIDE: 600; 310; 30; 20 INJECTION, SOLUTION INTRAVENOUS at 09:42:00

## 2023-01-19 RX ADMIN — SODIUM CHLORIDE, SODIUM LACTATE, POTASSIUM CHLORIDE, CALCIUM CHLORIDE: 600; 310; 30; 20 INJECTION, SOLUTION INTRAVENOUS at 12:30:00

## 2023-01-19 NOTE — ANESTHESIA PROCEDURE NOTES
Airway  Date/Time: 1/19/2023 10:02 AM  Urgency: elective    Airway not difficult    General Information and Staff    Patient location during procedure: OR  Anesthesiologist: Mukund Sandhu MD  Resident/CRNA: Halina Anglin CRNA  Performed: CRNA     Indications and Patient Condition  Indications for airway management: anesthesia  Spontaneous Ventilation: absent  Sedation level: deep  Preoxygenated: yes  Patient position: sniffing  Mask difficulty assessment: 0 - not attempted    Final Airway Details  Final airway type: endotracheal airway      Successful airway: ETT  Cuffed: yes   Successful intubation technique: Video laryngoscopy  Facilitating devices/methods: intubating stylet  Endotracheal tube insertion site: oral  Blade: GlideScope  Blade size: #3  ETT size (mm): 7.5    Cormack-Lehane Classification: grade I - full view of glottis  Placement verified by: chest auscultation and capnometry   Measured from: lips  ETT to lips (cm): 22  Number of attempts at approach: 1  Number of other approaches attempted: 0

## 2023-01-19 NOTE — ANESTHESIA POSTPROCEDURE EVALUATION
[facility] Patient Status:  Hospital Outpatient Surgery   Age/Gender 50year old male MRN [id number]   [facility] SURGERY Attending Song Barker MD   [facility] Day # 0 PCP Iram Hillman MD       Anesthesia Post-op Note    RIGHT SHOULDER ARTHROSCOPY OPEN BICEP TENODESIS, SUBACROMIAL DECOMPRESSION, SUPERIOR LABRUM ANTERIOR AND POSTERIOR REPAIR. Procedure Summary     Date: 01/19/23 Room / Location: [facility] MAIN OR 12 / [facility] MAIN OR    Anesthesia Start: 3406 Anesthesia Stop: 8919    Procedure: RIGHT SHOULDER ARTHROSCOPY OPEN BICEP TENODESIS, SUBACROMIAL DECOMPRESSION, SUPERIOR LABRUM ANTERIOR AND POSTERIOR REPAIR. (Right: Shoulder) Diagnosis:       Rupture of right proximal biceps tendon, initial encounter      (Rupture of right proximal biceps tendon, initial encounter [D11.600T])    Surgeons: Song Barker MD Anesthesiologist: Maty Prado MD    Anesthesia Type: general ASA Status: 3          Anesthesia Type: general    Vitals Value Taken Time   /95 01/19/23 1230   Temp 97.3 01/19/23 1230   Pulse 90 01/19/23 1230   Resp 20 01/19/23 1230   SpO2 93 01/19/23 1230       Patient Location: PACU    Anesthesia Type: general    Airway Patency: patent and extubated    Postop Pain Control: adequate    Mental Status: preanesthetic baseline    Nausea/Vomiting: none    Cardiopulmonary/Hydration status: stable euvolemic    Complications: no apparent anesthesia related complications    Postop vital signs: stable    Dental Exam: Unchanged from Preop    Patient to be discharged from PACU when criteria met.

## 2023-01-19 NOTE — ANESTHESIA PROCEDURE NOTES
Airway  Date/Time: 1/19/2023 9:55 AM  Urgency: elective    Airway not difficult    General Information and Staff    Patient location during procedure: OR  Anesthesiologist: Ruby Wang MD  Resident/CRNA: Lucretia Tapia CRNA  Performed: CRNA     Indications and Patient Condition  Indications for airway management: anesthesia  Spontaneous Ventilation: absent  Sedation level: deep  Preoxygenated: yes  Patient position: sniffing  Mask difficulty assessment: 0 - not attempted    Final Airway Details  Final airway type: supraglottic airway      Successful airway: classic  Size 4       Number of attempts at approach: 1  Number of other approaches attempted: 0    Additional Comments  Dentition per pre op

## 2023-01-19 NOTE — BRIEF OP NOTE
Pre-Operative Diagnosis: Rupture of right proximal biceps tendon, initial encounter [A75.939F]     Post-Operative Diagnosis: Rupture of right proximal biceps tendon, initial encounter [P17.811X]      Procedure Performed:   RIGHT SHOULDER ARTHROSCOPY OPEN BICEP TENODESIS, SUBACROMIAL DECOMPRESSION, SUPERIOR LABRUM ANTERIOR AND POSTERIOR REPAIR. Surgeon(s) and Role:     * Sarah James MD - Primary    Assistant(s):  Surgical Assistant.: Craig Cintron-Cherrie Rincon     Surgical Findings: Mild degenerative changes along the superior labrum managed with debridement of type I SLAP tearing without need for repair. Adequate subacromial decompression and distal clavicle excision performed. Extensive dissection performed in the anterior arm identifying the previously ruptured biceps tendon, this was retensioned and tenodesed with a dual anchor construct involving a proximal tenodesis button as well as a double loaded fiber tack. Adequate hemostasis at conclusion of procedure.      Specimen: None     Estimated Blood Loss: Blood Output: 50 mL (1/19/2023 11:57 AM)      Dictation Number:  Jeremie Singh MD  1/19/2023  2:42 PM

## 2023-01-19 NOTE — ANESTHESIA PROCEDURE NOTES
Regional Block  Performed by: Maxim Gagnon CRNA  Authorized by: Laura Tapia MD       General Information and Staff    Start Time:   Anesthesiologist:  Laura Tapia MD  CRNA:  Maxim Gagnon CRNA  Performed by:  CRNA  Patient Location:  OR    Block Placement: Pre Induction  Site Identification: real time ultrasound guided and image stored and retrievable    Block site/laterality marked before start: site marked  Reason for Block: at surgeon's request and post-op pain management    Preanesthetic Checklist: 2 patient identifers, IV checked, site marked, risks and benefits discussed, monitors and equipment checked, pre-op evaluation, timeout performed, anesthesia consent, sterile technique used, no prohibitive neurological deficits and no local skin infection at insertion site      Procedure Details    Patient Position:  Supine  Prep: ChloraPrep    Monitoring:  Cardiac monitor, continuous pulse ox and blood pressure cuff  Block Type: Interscalene  Laterality:  Right  Injection Technique:  Single-shot    Needle    Needle Type:  Short-bevel and echogenic  Needle Gauge:  21 G  Needle Length:  100 mm  Needle Localization:  Ultrasound guidance  Reason for Ultrasound Use: appropriate spread of the medication was noted in real time and no ultrasound evidence of intravascular and/or intraneural injection            Assessment    Injection Assessment:  Good spread noted, negative resistance, negative aspiration for heme, incremental injection, low pressure, local visualized surrounding nerve on ultrasound and no pain on injection  Paresthesia Pain:  None  Heart Rate Change: No    - Patient tolerated block procedure well without evidence of immediate block related complications.      Medications      Additional Comments    Medication:  Bupivacaine 0.5% 30mL & PF Decadron 2mg

## 2023-01-20 ENCOUNTER — TELEPHONE (OUTPATIENT)
Dept: PHYSICAL THERAPY | Facility: HOSPITAL | Age: 53
End: 2023-01-20

## 2023-01-23 ENCOUNTER — OFFICE VISIT (OUTPATIENT)
Dept: PHYSICAL THERAPY | Facility: HOSPITAL | Age: 53
End: 2023-01-23
Attending: ORTHOPAEDIC SURGERY
Payer: COMMERCIAL

## 2023-01-23 DIAGNOSIS — S46.211A RUPTURE OF RIGHT PROXIMAL BICEPS TENDON, INITIAL ENCOUNTER: ICD-10-CM

## 2023-01-23 PROCEDURE — 97110 THERAPEUTIC EXERCISES: CPT

## 2023-01-23 PROCEDURE — 97162 PT EVAL MOD COMPLEX 30 MIN: CPT

## 2023-01-27 ENCOUNTER — OFFICE VISIT (OUTPATIENT)
Dept: ORTHOPEDICS CLINIC | Facility: CLINIC | Age: 53
End: 2023-01-27
Payer: COMMERCIAL

## 2023-01-27 ENCOUNTER — OFFICE VISIT (OUTPATIENT)
Dept: PHYSICAL THERAPY | Facility: HOSPITAL | Age: 53
End: 2023-01-27
Attending: ORTHOPAEDIC SURGERY
Payer: COMMERCIAL

## 2023-01-27 ENCOUNTER — LABORATORY ENCOUNTER (OUTPATIENT)
Dept: LAB | Age: 53
End: 2023-01-27
Attending: STUDENT IN AN ORGANIZED HEALTH CARE EDUCATION/TRAINING PROGRAM
Payer: COMMERCIAL

## 2023-01-27 DIAGNOSIS — Z98.890 S/P ARTHROSCOPY OF SHOULDER: Primary | ICD-10-CM

## 2023-01-27 DIAGNOSIS — E03.9 HYPOTHYROIDISM, UNSPECIFIED TYPE: ICD-10-CM

## 2023-01-27 LAB
T3 SERPL-MCNC: 120 NG/DL (ref 60–181)
T4 FREE SERPL-MCNC: 0.7 NG/DL (ref 0.8–1.7)
TSI SER-ACNC: 2.55 MIU/ML (ref 0.36–3.74)

## 2023-01-27 PROCEDURE — 97140 MANUAL THERAPY 1/> REGIONS: CPT

## 2023-01-27 PROCEDURE — 36415 COLL VENOUS BLD VENIPUNCTURE: CPT

## 2023-01-27 PROCEDURE — 97110 THERAPEUTIC EXERCISES: CPT

## 2023-01-27 PROCEDURE — 99024 POSTOP FOLLOW-UP VISIT: CPT | Performed by: PHYSICIAN ASSISTANT

## 2023-01-27 PROCEDURE — 84443 ASSAY THYROID STIM HORMONE: CPT

## 2023-01-27 PROCEDURE — 84480 ASSAY TRIIODOTHYRONINE (T3): CPT

## 2023-01-27 PROCEDURE — 84439 ASSAY OF FREE THYROXINE: CPT

## 2023-01-29 DIAGNOSIS — F41.9 ANXIETY AND DEPRESSION: ICD-10-CM

## 2023-01-29 DIAGNOSIS — F32.A ANXIETY AND DEPRESSION: ICD-10-CM

## 2023-01-30 ENCOUNTER — OFFICE VISIT (OUTPATIENT)
Dept: PHYSICAL THERAPY | Facility: HOSPITAL | Age: 53
End: 2023-01-30
Attending: FAMILY MEDICINE
Payer: COMMERCIAL

## 2023-01-30 ENCOUNTER — OFFICE VISIT (OUTPATIENT)
Facility: CLINIC | Age: 53
End: 2023-01-30
Payer: COMMERCIAL

## 2023-01-30 VITALS
DIASTOLIC BLOOD PRESSURE: 100 MMHG | WEIGHT: 248.63 LBS | HEART RATE: 90 BPM | BODY MASS INDEX: 36 KG/M2 | SYSTOLIC BLOOD PRESSURE: 118 MMHG

## 2023-01-30 DIAGNOSIS — R79.89 LOW TESTOSTERONE IN MALE: ICD-10-CM

## 2023-01-30 DIAGNOSIS — E21.0 PRIMARY HYPERPARATHYROIDISM (HCC): Primary | ICD-10-CM

## 2023-01-30 DIAGNOSIS — E03.9 HYPOTHYROIDISM, UNSPECIFIED TYPE: ICD-10-CM

## 2023-01-30 PROCEDURE — 99215 OFFICE O/P EST HI 40 MIN: CPT | Performed by: STUDENT IN AN ORGANIZED HEALTH CARE EDUCATION/TRAINING PROGRAM

## 2023-01-30 PROCEDURE — 3074F SYST BP LT 130 MM HG: CPT | Performed by: STUDENT IN AN ORGANIZED HEALTH CARE EDUCATION/TRAINING PROGRAM

## 2023-01-30 PROCEDURE — 97110 THERAPEUTIC EXERCISES: CPT

## 2023-01-30 PROCEDURE — 3080F DIAST BP >= 90 MM HG: CPT | Performed by: STUDENT IN AN ORGANIZED HEALTH CARE EDUCATION/TRAINING PROGRAM

## 2023-01-30 PROCEDURE — 97140 MANUAL THERAPY 1/> REGIONS: CPT

## 2023-01-30 RX ORDER — LEVOTHYROXINE AND LIOTHYRONINE 9.5; 2.25 UG/1; UG/1
15 TABLET ORAL DAILY
Qty: 180 TABLET | Refills: 1 | Status: SHIPPED | OUTPATIENT
Start: 2023-01-30

## 2023-01-30 RX ORDER — BUPROPION HYDROCHLORIDE 150 MG/1
TABLET ORAL
Qty: 90 TABLET | Refills: 0 | Status: SHIPPED | OUTPATIENT
Start: 2023-01-30

## 2023-01-30 RX ORDER — THYROID 30 MG/1
TABLET ORAL
Qty: 90 TABLET | Refills: 0 | OUTPATIENT
Start: 2023-01-30

## 2023-01-30 RX ORDER — LEVOTHYROXINE AND LIOTHYRONINE 38; 9 UG/1; UG/1
60 TABLET ORAL DAILY
Qty: 90 TABLET | Refills: 1 | Status: SHIPPED | OUTPATIENT
Start: 2023-01-30

## 2023-01-30 NOTE — TELEPHONE ENCOUNTER
Last OV 1.6.23 w/ MK (pre-op)   Last PE 4.8.22  Last REFILL 10.10.22 Bupropion ER 150mg #90 0R  Last LABS 10.10.22 HgA1c    Future Appointments   Date Time Provider Jose C Chong   1/30/2023 10:45 AM Pasha Jonnie, PT [facility] PHYS [address]   1/30/2023  2:20 PM Valentina Meza MD [facility] [place]   2/3/2023  7:00 AM Pasha Baraga, PT [facility] PHYS [facility]   2/10/2023  7:45 AM Pasha Baraga, PT [facility] PHYS [address]   2/13/2023  7:45 AM Pasha Baraga, PT [facility] PHYS [address]   2/17/2023  7:00 AM Pasha Baraga, PT [facility] PHYS [facility] AT [facility]   2/20/2023  7:45 AM Pasha Baraga, PT [facility] PHYS [facility] AT [facility]   2/24/2023  7:00 AM Pasha Baraga, PT [facility] PHYS [facility] AT [facility]   2/27/2023  7:00 AM Pasha Jonnie, PT [facility] PHYS [facility] AT [facility]   2/27/2023  8:00 AM Martín Steele MD [facility] [address] [facility] [address]   3/3/2023  7:00 AM Pasha Jonnie, PT [facility] PHYS [address]   3/6/2023  7:00 AM Pasha Jonnie, PT [facility] PHYS [address]   3/22/2023  9:00 AM Román Grier MD [facility]         Per PROTOCOL?  Not on protocol     Please Advise

## 2023-01-30 NOTE — TELEPHONE ENCOUNTER
LOV-8/1    Future Appointments   Date Time Provider Jose C Chong   1/30/2023 10:45 AM Luz Maffucci, PT [facility] [address]   1/30/2023  2:20 PM Cici Hawk MD [facility] [place]   2/3/2023  7:00 AM Luz Maffucci, PT [address] [facility] PHYS [facility]   2/10/2023  7:45 AM Luz Maffucci, PT [address] PHYS [facility]   2/13/2023  7:45 AM Luz Maffucci, PT [facility] AT [facility]   2/17/2023  7:00 AM Luz Maffucci, PT [address] [facility] AT [facility]   2/20/2023  7:45 AM Luz Maffucci, PT [facility] AT [facility]   2/24/2023  7:00 AM Luz Maffucci, PT [address] [facility] PHYS [facility] AT [facility]   2/27/2023  7:00 AM Luz Maffucci, PT [address] [facility] AT [facility]   2/27/2023  8:00 AM Lisa Mcconnell MD [facility] [address] [facility]   3/3/2023  7:00 AM Luz Maffucci, PT [address] [facility] PHYS [address]   3/6/2023  7:00 AM Luz Maffucci, PT [address] [facility] PHYS [address]   3/22/2023  9:00 AM Lolita Junior MD [facility]     Called pt to verify his dose. He is currenlty taking [place] 65 mg daily so does not need the 30mg tabs. He has an appointment today and will discuss dose with Dr. Marcy Mcdaniel.  Refills will be completed at that time

## 2023-01-31 ENCOUNTER — APPOINTMENT (OUTPATIENT)
Dept: PHYSICAL THERAPY | Facility: HOSPITAL | Age: 53
End: 2023-01-31
Attending: ORTHOPAEDIC SURGERY
Payer: COMMERCIAL

## 2023-01-31 ENCOUNTER — TELEPHONE (OUTPATIENT)
Dept: ENDOCRINOLOGY CLINIC | Facility: CLINIC | Age: 53
End: 2023-01-31

## 2023-01-31 ENCOUNTER — TELEPHONE (OUTPATIENT)
Dept: INTERNAL MEDICINE CLINIC | Facility: CLINIC | Age: 53
End: 2023-01-31

## 2023-01-31 DIAGNOSIS — D75.1 ERYTHROCYTOSIS: Primary | ICD-10-CM

## 2023-01-31 DIAGNOSIS — G47.10 DAYTIME HYPERSOMNIA: ICD-10-CM

## 2023-01-31 NOTE — TELEPHONE ENCOUNTER
Please call to inform patient that I received the note from Dr. Caryle Jesus and a referral for home sleep study was placed.  Thanks

## 2023-01-31 NOTE — TELEPHONE ENCOUNTER
Patient phoned office stating her just received a call from his PCP office regarding a referral they recieved for sleep study from [facility]. Patient said he is very upset and recalls a detailed discuss with MD during last OV that he was not interested in the sleep study. Patient states this is a HIPAA violation having sent the referral to his PCP without his consent. Patient states if this is how MD is going to do business he is done. I apologized to the patient for any misunderstanding and advised that MD was with a current patient but I would forward message to MD for follow up. Patient expressed understanding and is agreeable.

## 2023-01-31 NOTE — TELEPHONE ENCOUNTER
Phoned pt back to clarify misunderstand. Pt wanted chart forwarded to PCP in regards to the hyperparathyroidism only. He did not want PCP notified about doing an JAYLIN workup because he is not interested in that right now. Will addend note. Pt agreeable.     Black Schofield MD

## 2023-01-31 NOTE — TELEPHONE ENCOUNTER
Spoke with pt.  He told Dr. Gigi Jacobsen he wasn't going to do the sleep study so he's not sure why anything was sent to you, just [facility]

## 2023-02-02 NOTE — Clinical Note
Pt wanted us to double-check with pharmacy that they were going to dispense the full 26 vials x 1mL each. He said last time he only got 4 vials and was upset. None

## 2023-02-03 ENCOUNTER — OFFICE VISIT (OUTPATIENT)
Dept: PHYSICAL THERAPY | Facility: HOSPITAL | Age: 53
End: 2023-02-03
Attending: ORTHOPAEDIC SURGERY
Payer: COMMERCIAL

## 2023-02-03 PROCEDURE — 97140 MANUAL THERAPY 1/> REGIONS: CPT

## 2023-02-03 PROCEDURE — 97110 THERAPEUTIC EXERCISES: CPT

## 2023-02-08 ENCOUNTER — TELEPHONE (OUTPATIENT)
Dept: PHYSICAL THERAPY | Facility: HOSPITAL | Age: 53
End: 2023-02-08

## 2023-02-09 ENCOUNTER — APPOINTMENT (OUTPATIENT)
Dept: PHYSICAL THERAPY | Facility: HOSPITAL | Age: 53
End: 2023-02-09
Attending: ORTHOPAEDIC SURGERY
Payer: COMMERCIAL

## 2023-02-10 ENCOUNTER — APPOINTMENT (OUTPATIENT)
Dept: PHYSICAL THERAPY | Facility: HOSPITAL | Age: 53
End: 2023-02-10
Attending: ORTHOPAEDIC SURGERY
Payer: COMMERCIAL

## 2023-02-13 ENCOUNTER — OFFICE VISIT (OUTPATIENT)
Dept: PHYSICAL THERAPY | Facility: HOSPITAL | Age: 53
End: 2023-02-13
Attending: ORTHOPAEDIC SURGERY
Payer: COMMERCIAL

## 2023-02-13 PROCEDURE — 97110 THERAPEUTIC EXERCISES: CPT

## 2023-02-13 PROCEDURE — 97140 MANUAL THERAPY 1/> REGIONS: CPT

## 2023-02-17 ENCOUNTER — OFFICE VISIT (OUTPATIENT)
Dept: PHYSICAL THERAPY | Facility: HOSPITAL | Age: 53
End: 2023-02-17
Attending: ORTHOPAEDIC SURGERY
Payer: COMMERCIAL

## 2023-02-17 PROCEDURE — 97140 MANUAL THERAPY 1/> REGIONS: CPT

## 2023-02-17 PROCEDURE — 97110 THERAPEUTIC EXERCISES: CPT

## 2023-02-20 ENCOUNTER — OFFICE VISIT (OUTPATIENT)
Dept: PHYSICAL THERAPY | Facility: HOSPITAL | Age: 53
End: 2023-02-20
Attending: ORTHOPAEDIC SURGERY
Payer: COMMERCIAL

## 2023-02-20 PROCEDURE — 97140 MANUAL THERAPY 1/> REGIONS: CPT

## 2023-02-20 PROCEDURE — 97110 THERAPEUTIC EXERCISES: CPT

## 2023-02-24 ENCOUNTER — OFFICE VISIT (OUTPATIENT)
Dept: PHYSICAL THERAPY | Facility: HOSPITAL | Age: 53
End: 2023-02-24
Attending: ORTHOPAEDIC SURGERY
Payer: COMMERCIAL

## 2023-02-24 PROCEDURE — 97110 THERAPEUTIC EXERCISES: CPT

## 2023-02-24 PROCEDURE — 97140 MANUAL THERAPY 1/> REGIONS: CPT

## 2023-02-27 ENCOUNTER — OFFICE VISIT (OUTPATIENT)
Dept: PHYSICAL THERAPY | Facility: HOSPITAL | Age: 53
End: 2023-02-27
Attending: ORTHOPAEDIC SURGERY
Payer: COMMERCIAL

## 2023-02-27 ENCOUNTER — OFFICE VISIT (OUTPATIENT)
Dept: ORTHOPEDICS CLINIC | Facility: CLINIC | Age: 53
End: 2023-02-27
Payer: COMMERCIAL

## 2023-02-27 ENCOUNTER — OFFICE VISIT (OUTPATIENT)
Dept: INTERNAL MEDICINE CLINIC | Facility: CLINIC | Age: 53
End: 2023-02-27
Payer: COMMERCIAL

## 2023-02-27 VITALS
WEIGHT: 248 LBS | DIASTOLIC BLOOD PRESSURE: 84 MMHG | TEMPERATURE: 97 F | HEIGHT: 70 IN | HEART RATE: 72 BPM | BODY MASS INDEX: 35.5 KG/M2 | SYSTOLIC BLOOD PRESSURE: 136 MMHG

## 2023-02-27 DIAGNOSIS — I25.84 CORONARY ARTERY CALCIFICATION: ICD-10-CM

## 2023-02-27 DIAGNOSIS — Z79.890 LONG-TERM CURRENT USE OF TESTOSTERONE REPLACEMENT THERAPY: ICD-10-CM

## 2023-02-27 DIAGNOSIS — Z98.890 S/P ARTHROSCOPY OF SHOULDER: Primary | ICD-10-CM

## 2023-02-27 DIAGNOSIS — I25.10 CORONARY ARTERY CALCIFICATION: ICD-10-CM

## 2023-02-27 DIAGNOSIS — E78.00 HYPERCHOLESTEREMIA: ICD-10-CM

## 2023-02-27 DIAGNOSIS — D75.1 ERYTHROCYTOSIS: ICD-10-CM

## 2023-02-27 DIAGNOSIS — Z98.890 S/P ARTHROSCOPY OF RIGHT SHOULDER: Primary | ICD-10-CM

## 2023-02-27 PROCEDURE — 3079F DIAST BP 80-89 MM HG: CPT | Performed by: FAMILY MEDICINE

## 2023-02-27 PROCEDURE — 97140 MANUAL THERAPY 1/> REGIONS: CPT

## 2023-02-27 PROCEDURE — 99024 POSTOP FOLLOW-UP VISIT: CPT | Performed by: PHYSICIAN ASSISTANT

## 2023-02-27 PROCEDURE — 97110 THERAPEUTIC EXERCISES: CPT

## 2023-02-27 PROCEDURE — 3008F BODY MASS INDEX DOCD: CPT | Performed by: FAMILY MEDICINE

## 2023-02-27 PROCEDURE — 3075F SYST BP GE 130 - 139MM HG: CPT | Performed by: FAMILY MEDICINE

## 2023-02-27 PROCEDURE — 99214 OFFICE O/P EST MOD 30 MIN: CPT | Performed by: FAMILY MEDICINE

## 2023-03-03 ENCOUNTER — OFFICE VISIT (OUTPATIENT)
Dept: PHYSICAL THERAPY | Facility: HOSPITAL | Age: 53
End: 2023-03-03
Attending: ORTHOPAEDIC SURGERY
Payer: COMMERCIAL

## 2023-03-03 PROCEDURE — 97140 MANUAL THERAPY 1/> REGIONS: CPT

## 2023-03-03 PROCEDURE — 97110 THERAPEUTIC EXERCISES: CPT

## 2023-03-06 ENCOUNTER — TELEPHONE (OUTPATIENT)
Dept: ENDOCRINOLOGY CLINIC | Facility: CLINIC | Age: 53
End: 2023-03-06

## 2023-03-06 ENCOUNTER — OFFICE VISIT (OUTPATIENT)
Dept: PHYSICAL THERAPY | Facility: HOSPITAL | Age: 53
End: 2023-03-06
Attending: ORTHOPAEDIC SURGERY
Payer: COMMERCIAL

## 2023-03-06 ENCOUNTER — LAB ENCOUNTER (OUTPATIENT)
Dept: LAB | Age: 53
End: 2023-03-06
Attending: STUDENT IN AN ORGANIZED HEALTH CARE EDUCATION/TRAINING PROGRAM
Payer: COMMERCIAL

## 2023-03-06 DIAGNOSIS — R79.89 LOW TESTOSTERONE IN MALE: ICD-10-CM

## 2023-03-06 DIAGNOSIS — E03.9 HYPOTHYROIDISM, UNSPECIFIED TYPE: ICD-10-CM

## 2023-03-06 LAB
ERYTHROCYTE [DISTWIDTH] IN BLOOD BY AUTOMATED COUNT: 12.8 %
HCT VFR BLD AUTO: 54.6 %
HGB BLD-MCNC: 18.4 G/DL
MCH RBC QN AUTO: 30.7 PG (ref 26–34)
MCHC RBC AUTO-ENTMCNC: 33.7 G/DL (ref 31–37)
MCV RBC AUTO: 91 FL
PLATELET # BLD AUTO: 238 10(3)UL (ref 150–450)
RBC # BLD AUTO: 6 X10(6)UL
T3 SERPL-MCNC: 119 NG/DL (ref 60–181)
T4 FREE SERPL-MCNC: 0.7 NG/DL (ref 0.8–1.7)
TESTOST SERPL-MCNC: 968.66 NG/DL
TSI SER-ACNC: 1.61 MIU/ML (ref 0.36–3.74)
WBC # BLD AUTO: 6.9 X10(3) UL (ref 4–11)

## 2023-03-06 PROCEDURE — 84439 ASSAY OF FREE THYROXINE: CPT

## 2023-03-06 PROCEDURE — 85027 COMPLETE CBC AUTOMATED: CPT

## 2023-03-06 PROCEDURE — 84480 ASSAY TRIIODOTHYRONINE (T3): CPT

## 2023-03-06 PROCEDURE — 36415 COLL VENOUS BLD VENIPUNCTURE: CPT

## 2023-03-06 PROCEDURE — 84443 ASSAY THYROID STIM HORMONE: CPT

## 2023-03-06 PROCEDURE — 97110 THERAPEUTIC EXERCISES: CPT

## 2023-03-06 PROCEDURE — 97140 MANUAL THERAPY 1/> REGIONS: CPT

## 2023-03-06 PROCEDURE — 84403 ASSAY OF TOTAL TESTOSTERONE: CPT

## 2023-03-06 NOTE — TELEPHONE ENCOUNTER
Pt called Verified name and . Pt upset that lab cem his CBC today when he told them not to. He state he has been taking iron and has eaten red meat for the past several days. He was planning on giving blood this week and wanted the CBC done on next Monday. He does not want to have to pay for today's CBC. I told him I would let Dr. Prema Brown know and see what she advises. If she wants a repeat CBC done next Monday I will call our lab to see if they can reverse charges for today's CBC. Pt states he has missed 2 blood donations due to his surgery. He has had his blood drawn for the past 10 years so he can remain on testosterone.

## 2023-03-10 ENCOUNTER — APPOINTMENT (OUTPATIENT)
Dept: PHYSICAL THERAPY | Facility: HOSPITAL | Age: 53
End: 2023-03-10
Attending: FAMILY MEDICINE
Payer: COMMERCIAL

## 2023-03-13 ENCOUNTER — APPOINTMENT (OUTPATIENT)
Dept: PHYSICAL THERAPY | Facility: HOSPITAL | Age: 53
End: 2023-03-13
Attending: FAMILY MEDICINE
Payer: COMMERCIAL

## 2023-03-17 ENCOUNTER — OFFICE VISIT (OUTPATIENT)
Dept: PHYSICAL THERAPY | Facility: HOSPITAL | Age: 53
End: 2023-03-17
Attending: FAMILY MEDICINE
Payer: COMMERCIAL

## 2023-03-17 PROCEDURE — 97110 THERAPEUTIC EXERCISES: CPT

## 2023-03-17 PROCEDURE — 97140 MANUAL THERAPY 1/> REGIONS: CPT

## 2023-03-20 ENCOUNTER — TELEMEDICINE (OUTPATIENT)
Dept: INTERNAL MEDICINE CLINIC | Facility: CLINIC | Age: 53
End: 2023-03-20
Payer: COMMERCIAL

## 2023-03-20 DIAGNOSIS — R93.1 AGATSTON CORONARY ARTERY CALCIUM SCORE LESS THAN 100: ICD-10-CM

## 2023-03-20 DIAGNOSIS — E78.00 HYPERCHOLESTEREMIA: Primary | ICD-10-CM

## 2023-03-20 DIAGNOSIS — Z78.9 STATIN INTOLERANCE: ICD-10-CM

## 2023-03-20 DIAGNOSIS — I25.10 CORONARY ARTERY CALCIFICATION: ICD-10-CM

## 2023-03-20 DIAGNOSIS — I25.84 CORONARY ARTERY CALCIFICATION: ICD-10-CM

## 2023-03-20 PROCEDURE — 99214 OFFICE O/P EST MOD 30 MIN: CPT | Performed by: FAMILY MEDICINE

## 2023-03-20 RX ORDER — EZETIMIBE 10 MG/1
10 TABLET ORAL NIGHTLY
Qty: 30 TABLET | Refills: 0 | Status: SHIPPED | OUTPATIENT
Start: 2023-03-20

## 2023-03-20 NOTE — PROGRESS NOTES
Due to COVID-19 ACTION PLAN, the patient's office visit was converted to a video visit with informed patient consent. Time Spent: 15 min    Subjective     HPI:   Cirilo Tate is a 50year old male who presents for follow-up. He did restart his Crestor and again did not tolerate it due to myalgias and joint pains. He has upcoming evaluation with Dr. Andressa Chavez this week. REVIEW OF SYSTEMS:  GENERAL: Feels well otherwise. Physical Exam:  Appears well on exam.    Assessment and Plan:  [facility] is presenting for follow    Hypercholesteremia  Coronary artery calcification with calcium score < 100  Statin intolerance  Will begin trial of Zetia given persistent intolerance to statin. He will follow-up with any side effects in one month and recheck lipid panel in 3 months.   - ezetimibe 10 MG Oral Tab; Take 1 tablet (10 mg total) by mouth nightly. Dispense: 30 tablet; Refill: 0  - LIPID PANEL; Future      Sampson Cornell MD    [facility] understands phone evaluation is not a substitute for face-to-face examination or emergency care. Patient advised to go to ER or call 911 for worsening symptoms or acute distress. Please note that the following visit was completed using two-way, real-time interactive video communication. This has been done in good nicholas to provide continuity of care in the best interest of the provider-patient relationship, due to the on-going public health crisis/national emergency and because of restrictions of visitation. There are limitations of this visit as no physical exam could be performed. Every conscious effort was taken to allow for sufficient and adequate time. This billing visit was spent on reviewing labs, medications, radiology tests and decision making. Appropriate medical decision-making and tests are ordered as detailed in the plan of care above.

## 2023-03-22 ENCOUNTER — OFFICE VISIT (OUTPATIENT)
Dept: INTERNAL MEDICINE CLINIC | Facility: CLINIC | Age: 53
End: 2023-03-22
Payer: COMMERCIAL

## 2023-03-22 VITALS
DIASTOLIC BLOOD PRESSURE: 80 MMHG | RESPIRATION RATE: 18 BRPM | SYSTOLIC BLOOD PRESSURE: 130 MMHG | WEIGHT: 255.38 LBS | HEART RATE: 88 BPM | HEIGHT: 70 IN | OXYGEN SATURATION: 98 % | BODY MASS INDEX: 36.56 KG/M2

## 2023-03-22 DIAGNOSIS — R79.89 LOW TESTOSTERONE IN MALE: ICD-10-CM

## 2023-03-22 DIAGNOSIS — E03.9 HYPOTHYROIDISM, UNSPECIFIED TYPE: ICD-10-CM

## 2023-03-22 DIAGNOSIS — E78.00 HYPERCHOLESTEREMIA: Primary | ICD-10-CM

## 2023-03-22 DIAGNOSIS — Z51.81 THERAPEUTIC DRUG MONITORING: ICD-10-CM

## 2023-03-22 PROCEDURE — 3008F BODY MASS INDEX DOCD: CPT | Performed by: INTERNAL MEDICINE

## 2023-03-22 PROCEDURE — 3079F DIAST BP 80-89 MM HG: CPT | Performed by: INTERNAL MEDICINE

## 2023-03-22 PROCEDURE — 3075F SYST BP GE 130 - 139MM HG: CPT | Performed by: INTERNAL MEDICINE

## 2023-03-22 PROCEDURE — 99204 OFFICE O/P NEW MOD 45 MIN: CPT | Performed by: INTERNAL MEDICINE

## 2023-03-22 NOTE — PATIENT INSTRUCTIONS
WEGOVY:   0.25 mg x 2 weeks then 0.5 mg x 1 week      script at 1.0 mg q  weekly       These macronutrient values reflect your cutting calories of 2,013 calories per day,     Moderate Carb (30/35/35)  151g  protein  78g  fats  176g  carbs

## 2023-03-24 ENCOUNTER — OFFICE VISIT (OUTPATIENT)
Dept: PHYSICAL THERAPY | Facility: HOSPITAL | Age: 53
End: 2023-03-24
Attending: FAMILY MEDICINE
Payer: COMMERCIAL

## 2023-03-24 PROCEDURE — 97110 THERAPEUTIC EXERCISES: CPT

## 2023-03-24 PROCEDURE — 97140 MANUAL THERAPY 1/> REGIONS: CPT

## 2023-03-27 ENCOUNTER — OFFICE VISIT (OUTPATIENT)
Dept: PHYSICAL THERAPY | Facility: HOSPITAL | Age: 53
End: 2023-03-27
Attending: FAMILY MEDICINE
Payer: COMMERCIAL

## 2023-03-27 PROCEDURE — 97140 MANUAL THERAPY 1/> REGIONS: CPT

## 2023-03-27 PROCEDURE — 97110 THERAPEUTIC EXERCISES: CPT

## 2023-03-30 ENCOUNTER — APPOINTMENT (OUTPATIENT)
Dept: PHYSICAL THERAPY | Facility: HOSPITAL | Age: 53
End: 2023-03-30
Attending: FAMILY MEDICINE
Payer: COMMERCIAL

## 2023-03-31 ENCOUNTER — OFFICE VISIT (OUTPATIENT)
Dept: PHYSICAL THERAPY | Facility: HOSPITAL | Age: 53
End: 2023-03-31
Attending: FAMILY MEDICINE
Payer: COMMERCIAL

## 2023-03-31 PROCEDURE — 97110 THERAPEUTIC EXERCISES: CPT

## 2023-03-31 PROCEDURE — 97140 MANUAL THERAPY 1/> REGIONS: CPT

## 2023-04-03 ENCOUNTER — OFFICE VISIT (OUTPATIENT)
Dept: PHYSICAL THERAPY | Facility: HOSPITAL | Age: 53
End: 2023-04-03
Attending: FAMILY MEDICINE
Payer: COMMERCIAL

## 2023-04-03 ENCOUNTER — PATIENT MESSAGE (OUTPATIENT)
Dept: INTERNAL MEDICINE CLINIC | Facility: CLINIC | Age: 53
End: 2023-04-03

## 2023-04-03 PROCEDURE — 97110 THERAPEUTIC EXERCISES: CPT

## 2023-04-03 PROCEDURE — 97140 MANUAL THERAPY 1/> REGIONS: CPT

## 2023-04-04 NOTE — TELEPHONE ENCOUNTER
From: Asiya Chauhan  To: Leydi Hicks MD  Sent: 4/3/2023 6:27 PM CDT  Subject: Increase doseage    I increased my dose from . 25 to . 50 on Saturday morning. About 36 hours later I experienced significant diarrhea for about 6-8 hours.  Is this normal? Should I still increase my dose this Saturday to 1.0mg?

## 2023-04-05 DIAGNOSIS — R79.89 LOW TESTOSTERONE IN MALE: ICD-10-CM

## 2023-04-05 NOTE — TELEPHONE ENCOUNTER
He pushed to do an aggressive taper which I recommended against   I would have him stay at 0.5 at this time   Non infectious diarrhea can do otc imodium in the interim

## 2023-04-05 NOTE — TELEPHONE ENCOUNTER
I finally reached patient. He has had significant diarrhea with wegovy. When I called to verify the titration and foods. He said he is pretty good at watching the diet - not perfect. He said he was instructed to take 0.25 mg for 2 weeks then increase to 0.5 mg for one injection and now go to 1 mg on Saturday? He got a sample pen and did double dose to reach 0.5 mg one time    Please verify what you are wanting him to do. I explained that is not typical dosing and may be why he had issues.   He thinks he should get another sample pen and take 0.5 mg another time before going to 1 mg

## 2023-04-05 NOTE — TELEPHONE ENCOUNTER
I tried calling the patient again at [phone] and I hear my voice echo. I tried calling at [phone] and got voicemail.

## 2023-04-05 NOTE — TELEPHONE ENCOUNTER
LOV: 23    RTC: 6 months    FU: scheduled 23    Last Refill: 22    Month Supply [place] month      Component      Latest Ref Rng 3/6/2023   TESTOSTERONE      86.98 - 780.10 ng/dL 968.66 (H)       Legend:  (H) High

## 2023-04-06 RX ORDER — NEEDLES, DISPOSABLE 25GX5/8"
NEEDLE, DISPOSABLE MISCELLANEOUS
Qty: 100 EACH | Refills: 0 | Status: SHIPPED | OUTPATIENT
Start: 2023-04-06

## 2023-04-06 RX ORDER — TESTOSTERONE CYPIONATE 200 MG/ML
100 INJECTION, SOLUTION INTRAMUSCULAR
Qty: 4 ML | Refills: 0 | Status: SHIPPED | OUTPATIENT
Start: 2023-04-06

## 2023-04-06 NOTE — TELEPHONE ENCOUNTER
Last Testosterone was elevated - pt stated it was drawn right after a testosterone injection. H&H also elevated - pt stated he did not get phlebotomized. CBC was ordered for him to repeat, not yet done. No further refills after this unless H&H downtrends.

## 2023-04-06 NOTE — TELEPHONE ENCOUNTER
[employer] message sent to patient reviewing refill sent, but need for CBC level to be re-checked. Closing this encounter.

## 2023-04-10 ENCOUNTER — LAB ENCOUNTER (OUTPATIENT)
Dept: LAB | Age: 53
End: 2023-04-10
Attending: STUDENT IN AN ORGANIZED HEALTH CARE EDUCATION/TRAINING PROGRAM
Payer: COMMERCIAL

## 2023-04-10 ENCOUNTER — OFFICE VISIT (OUTPATIENT)
Dept: PHYSICAL THERAPY | Facility: HOSPITAL | Age: 53
End: 2023-04-10
Attending: FAMILY MEDICINE
Payer: COMMERCIAL

## 2023-04-10 DIAGNOSIS — R79.89 LOW TESTOSTERONE IN MALE: ICD-10-CM

## 2023-04-10 LAB
ERYTHROCYTE [DISTWIDTH] IN BLOOD BY AUTOMATED COUNT: 13.5 %
HCT VFR BLD AUTO: 49.7 %
HGB BLD-MCNC: 16.3 G/DL
MCH RBC QN AUTO: 30.6 PG (ref 26–34)
MCHC RBC AUTO-ENTMCNC: 32.8 G/DL (ref 31–37)
MCV RBC AUTO: 93.2 FL
PLATELET # BLD AUTO: 247 10(3)UL (ref 150–450)
RBC # BLD AUTO: 5.33 X10(6)UL
WBC # BLD AUTO: 6.6 X10(3) UL (ref 4–11)

## 2023-04-10 PROCEDURE — 36415 COLL VENOUS BLD VENIPUNCTURE: CPT

## 2023-04-10 PROCEDURE — 85027 COMPLETE CBC AUTOMATED: CPT

## 2023-04-10 PROCEDURE — 97140 MANUAL THERAPY 1/> REGIONS: CPT

## 2023-04-10 PROCEDURE — 97110 THERAPEUTIC EXERCISES: CPT

## 2023-05-05 ENCOUNTER — OFFICE VISIT (OUTPATIENT)
Dept: PHYSICAL THERAPY | Facility: HOSPITAL | Age: 53
End: 2023-05-05
Attending: FAMILY MEDICINE
Payer: COMMERCIAL

## 2023-05-05 ENCOUNTER — LAB ENCOUNTER (OUTPATIENT)
Dept: LAB | Age: 53
End: 2023-05-05
Attending: STUDENT IN AN ORGANIZED HEALTH CARE EDUCATION/TRAINING PROGRAM
Payer: COMMERCIAL

## 2023-05-05 DIAGNOSIS — E78.00 HYPERCHOLESTEREMIA: ICD-10-CM

## 2023-05-05 DIAGNOSIS — E03.9 HYPOTHYROIDISM, UNSPECIFIED TYPE: ICD-10-CM

## 2023-05-05 DIAGNOSIS — Z51.81 THERAPEUTIC DRUG MONITORING: ICD-10-CM

## 2023-05-05 LAB
CHOLEST SERPL-MCNC: 163 MG/DL (ref ?–200)
FASTING PATIENT LIPID ANSWER: YES
FOLATE SERPL-MCNC: 10.4 NG/ML (ref 8.7–?)
HDLC SERPL-MCNC: 29 MG/DL (ref 40–59)
LDLC SERPL CALC-MCNC: 109 MG/DL (ref ?–100)
NONHDLC SERPL-MCNC: 134 MG/DL (ref ?–130)
T3 SERPL-MCNC: 180 NG/DL (ref 60–181)
T4 FREE SERPL-MCNC: 0.8 NG/DL (ref 0.8–1.7)
TRIGL SERPL-MCNC: 138 MG/DL (ref 30–149)
TSI SER-ACNC: 0.77 MIU/ML (ref 0.36–3.74)
VIT B12 SERPL-MCNC: 257 PG/ML (ref 193–986)
VLDLC SERPL CALC-MCNC: 24 MG/DL (ref 0–30)

## 2023-05-05 PROCEDURE — 82607 VITAMIN B-12: CPT

## 2023-05-05 PROCEDURE — 80061 LIPID PANEL: CPT

## 2023-05-05 PROCEDURE — 84480 ASSAY TRIIODOTHYRONINE (T3): CPT

## 2023-05-05 PROCEDURE — 97110 THERAPEUTIC EXERCISES: CPT

## 2023-05-05 PROCEDURE — 82746 ASSAY OF FOLIC ACID SERUM: CPT

## 2023-05-05 PROCEDURE — 84439 ASSAY OF FREE THYROXINE: CPT

## 2023-05-05 PROCEDURE — 97140 MANUAL THERAPY 1/> REGIONS: CPT

## 2023-05-05 PROCEDURE — 84443 ASSAY THYROID STIM HORMONE: CPT

## 2023-05-05 PROCEDURE — 36415 COLL VENOUS BLD VENIPUNCTURE: CPT

## 2023-05-08 ENCOUNTER — OFFICE VISIT (OUTPATIENT)
Dept: PHYSICAL THERAPY | Facility: HOSPITAL | Age: 53
End: 2023-05-08
Attending: FAMILY MEDICINE
Payer: COMMERCIAL

## 2023-05-08 ENCOUNTER — PATIENT MESSAGE (OUTPATIENT)
Dept: INTERNAL MEDICINE CLINIC | Facility: CLINIC | Age: 53
End: 2023-05-08

## 2023-05-08 PROCEDURE — 97110 THERAPEUTIC EXERCISES: CPT

## 2023-05-08 PROCEDURE — 97140 MANUAL THERAPY 1/> REGIONS: CPT

## 2023-05-08 NOTE — TELEPHONE ENCOUNTER
Requesting wegovy 1mg  LOV: 3/22/23  RTC: none on file  Filled: 3/22/23 #2ml with 0 refills    Future Appointments   Date Time Provider Jose C Arlette   5/12/2023  8:30 AM Savi Valentin, PT [facility] AT [facility] AT [facility]   6/9/2023  7:45 AM Savi Valentin, PT [facility] [address]   7/6/2023 12:20 PM Irene Angulo MD [facility] [address]   7/31/2023 11:00 AM Fátima Cristobal MD [facility] [place]     Patient called. Would like to go up on his wegovy. pending    Would like to do his own b12 injections at home.

## 2023-05-09 NOTE — TELEPHONE ENCOUNTER
Refills have been requested for the following medications, please advise and review:     Rx: TESTOSTERONE CYPIONATE 200 mg/mL INTRAMUSCULAR SOLUTION  Sig: Inject 0.5 mL (100 MG TOTAL) into the muscle twice a week. Route: Intramuscular  Quantity: 4 mL  Refills: 0   Last Rx Refill: 4/06/2023    Rx: THYROID (ARMOUR THYROID) 60 MG ORAL TAB  Sig: Take 1 tablet (60 mg total by mouth daily.   Route: Oral  Quantity: 90 tablet  Refills: 1  Last Rx Refill: 1/30/2023       LOV: 1/30/2023  Next Scheduled Appt: 7/31/2023      Preferred pharmacy:[facility]/pharmacy #[id number] - [place], [place]

## 2023-05-10 DIAGNOSIS — F41.9 ANXIETY AND DEPRESSION: ICD-10-CM

## 2023-05-10 DIAGNOSIS — F32.A ANXIETY AND DEPRESSION: ICD-10-CM

## 2023-05-10 RX ORDER — LEVOTHYROXINE AND LIOTHYRONINE 57; 13.5 UG/1; UG/1
90 TABLET ORAL DAILY
Qty: 90 TABLET | Refills: 1 | Status: SHIPPED | OUTPATIENT
Start: 2023-05-10

## 2023-05-10 NOTE — TELEPHONE ENCOUNTER
From: Jay Betancourt  To: Stefany Blevins MD  Sent: 5/8/2023 4:02 PM CDT  Subject: Question regarding FOLIC ACID SERUM(FOLATE)    I would like the injections.

## 2023-05-11 ENCOUNTER — NURSE ONLY (OUTPATIENT)
Dept: INTERNAL MEDICINE CLINIC | Facility: CLINIC | Age: 53
End: 2023-05-11
Payer: COMMERCIAL

## 2023-05-11 ENCOUNTER — TELEPHONE (OUTPATIENT)
Dept: INTERNAL MEDICINE CLINIC | Facility: CLINIC | Age: 53
End: 2023-05-11

## 2023-05-11 RX ORDER — SYRINGE AND NEEDLE,INSULIN,1ML 25GX1"
SYRINGE, EMPTY DISPOSABLE MISCELLANEOUS
Qty: 100 EACH | Refills: 0 | Status: SHIPPED | OUTPATIENT
Start: 2023-05-11

## 2023-05-11 RX ORDER — BUPROPION HYDROCHLORIDE 150 MG/1
TABLET ORAL
Qty: 90 TABLET | Refills: 0 | Status: SHIPPED | OUTPATIENT
Start: 2023-05-11 | End: 2023-05-15

## 2023-05-11 RX ORDER — PANCRELIPASE LIPASE, PANCRELIPASE PROTEASE, PANCRELIPASE AMYLASE 40000; 126000; 168000 [USP'U]/1; [USP'U]/1; [USP'U]/1
CAPSULE, DELAYED RELEASE ORAL
COMMUNITY

## 2023-05-11 NOTE — TELEPHONE ENCOUNTER
Refills have been requested for the following medications, please advise and review:     Rx: INSULIN SYRINGE-NEEDLE U-100 (BD INSULIN SYRINGE) 25G X 1\" ML  Sig: To be used for testosterone twice weekly.   Quantity: 100 each  Refills: 0     Preferred pharmacy: [facility]/pharmacy #[id number] - [place], [place]    Last Rx Refill: 8/02/2022  LOV: 1/30/2023  Next Scheduled Appt: 7/31/2023        Per LOV notes:   (R79.89) Low testosterone in male  Plan: testosterone cypionate 200 MG/ML Intramuscular         Solution  - cont IM test 100mg twice a week   - discussed testosterone normal range 300-900; pt is accustomed to T>1000 but is understanding of our goal range in the upper range of normal  - discussed the cardiovascular risks of erythrocytosis in the setting of TRT; offered referral to hematology, pt politely declined; pt agrees that if next CBC shows higher H&H, will cut down on TRT  - labs in 2 months

## 2023-05-12 ENCOUNTER — OFFICE VISIT (OUTPATIENT)
Dept: PHYSICAL THERAPY | Facility: HOSPITAL | Age: 53
End: 2023-05-12
Attending: FAMILY MEDICINE
Payer: COMMERCIAL

## 2023-05-12 PROCEDURE — 97140 MANUAL THERAPY 1/> REGIONS: CPT

## 2023-05-12 PROCEDURE — 97110 THERAPEUTIC EXERCISES: CPT

## 2023-05-14 DIAGNOSIS — E78.00 HYPERCHOLESTEREMIA: ICD-10-CM

## 2023-05-15 ENCOUNTER — OFFICE VISIT (OUTPATIENT)
Dept: INTERNAL MEDICINE CLINIC | Facility: CLINIC | Age: 53
End: 2023-05-15
Payer: COMMERCIAL

## 2023-05-15 VITALS
HEIGHT: 70 IN | SYSTOLIC BLOOD PRESSURE: 118 MMHG | HEART RATE: 88 BPM | TEMPERATURE: 98 F | DIASTOLIC BLOOD PRESSURE: 78 MMHG | WEIGHT: 238 LBS | BODY MASS INDEX: 34.07 KG/M2

## 2023-05-15 DIAGNOSIS — Z00.00 WELLNESS EXAMINATION: Primary | ICD-10-CM

## 2023-05-15 DIAGNOSIS — Z78.9 STATIN INTOLERANCE: ICD-10-CM

## 2023-05-15 DIAGNOSIS — I25.10 CORONARY ARTERY CALCIFICATION: ICD-10-CM

## 2023-05-15 DIAGNOSIS — R79.89 LOW TESTOSTERONE IN MALE: ICD-10-CM

## 2023-05-15 DIAGNOSIS — E66.9 OBESITY (BMI 30-39.9): ICD-10-CM

## 2023-05-15 DIAGNOSIS — E78.00 HYPERCHOLESTEREMIA: ICD-10-CM

## 2023-05-15 DIAGNOSIS — I25.84 CORONARY ARTERY CALCIFICATION: ICD-10-CM

## 2023-05-15 DIAGNOSIS — E03.9 HYPOTHYROIDISM, UNSPECIFIED TYPE: ICD-10-CM

## 2023-05-15 DIAGNOSIS — F90.0 ATTENTION DEFICIT HYPERACTIVITY DISORDER (ADHD), PREDOMINANTLY INATTENTIVE TYPE: ICD-10-CM

## 2023-05-15 DIAGNOSIS — Z12.11 SCREENING FOR MALIGNANT NEOPLASM OF COLON: ICD-10-CM

## 2023-05-15 PROCEDURE — 3008F BODY MASS INDEX DOCD: CPT | Performed by: FAMILY MEDICINE

## 2023-05-15 PROCEDURE — 3078F DIAST BP <80 MM HG: CPT | Performed by: FAMILY MEDICINE

## 2023-05-15 PROCEDURE — 90750 HZV VACC RECOMBINANT IM: CPT | Performed by: FAMILY MEDICINE

## 2023-05-15 PROCEDURE — 99396 PREV VISIT EST AGE 40-64: CPT | Performed by: FAMILY MEDICINE

## 2023-05-15 PROCEDURE — 3074F SYST BP LT 130 MM HG: CPT | Performed by: FAMILY MEDICINE

## 2023-05-15 PROCEDURE — 90471 IMMUNIZATION ADMIN: CPT | Performed by: FAMILY MEDICINE

## 2023-05-15 RX ORDER — EZETIMIBE 10 MG/1
TABLET ORAL
Qty: 90 TABLET | Refills: 1 | Status: SHIPPED | OUTPATIENT
Start: 2023-05-15

## 2023-05-29 RX ORDER — PANCRELIPASE LIPASE, PANCRELIPASE PROTEASE, PANCRELIPASE AMYLASE 40000; 126000; 168000 [USP'U]/1; [USP'U]/1; [USP'U]/1
3 CAPSULE, DELAYED RELEASE ORAL
Qty: 120 CAPSULE | Refills: 0 | Status: SHIPPED | OUTPATIENT
Start: 2023-05-29

## 2023-06-09 ENCOUNTER — OFFICE VISIT (OUTPATIENT)
Dept: PHYSICAL THERAPY | Facility: HOSPITAL | Age: 53
End: 2023-06-09
Attending: FAMILY MEDICINE
Payer: COMMERCIAL

## 2023-06-09 PROCEDURE — 97110 THERAPEUTIC EXERCISES: CPT

## 2023-06-09 PROCEDURE — 97140 MANUAL THERAPY 1/> REGIONS: CPT

## 2023-06-14 DIAGNOSIS — R79.89 LOW TESTOSTERONE IN MALE: ICD-10-CM

## 2023-06-14 RX ORDER — TESTOSTERONE CYPIONATE 200 MG/ML
100 INJECTION, SOLUTION INTRAMUSCULAR
Qty: 4 ML | Refills: 2 | Status: SHIPPED | OUTPATIENT
Start: 2023-06-15

## 2023-06-15 ENCOUNTER — PATIENT MESSAGE (OUTPATIENT)
Dept: INTERNAL MEDICINE CLINIC | Facility: CLINIC | Age: 53
End: 2023-06-15

## 2023-06-15 NOTE — TELEPHONE ENCOUNTER
Requesting Wegovy increase  LOV: 3/23/23  RTC: not noted  Last Relevant Labs: na  Filled: 5/9/23 #3ml with 0 refills wegovy 1.7 mg    Future Appointments   Date Time Provider Jose C Chong   7/6/2023 12:20 PM Savi Parra MD [facility]

## 2023-06-15 NOTE — TELEPHONE ENCOUNTER
From: Asiya Chauhan  To: Leydi Hicks MD  Sent: 6/15/2023 7:12 AM CDT  Subject: CDGEBP     I need a refill to take this weekend, I believe we should be ready for 2.4. If this is correct can you send a refill to the pharmacy?  Thanks

## 2023-07-06 ENCOUNTER — OFFICE VISIT (OUTPATIENT)
Dept: INTERNAL MEDICINE CLINIC | Facility: CLINIC | Age: 53
End: 2023-07-06
Payer: COMMERCIAL

## 2023-07-06 VITALS
BODY MASS INDEX: 33.64 KG/M2 | DIASTOLIC BLOOD PRESSURE: 80 MMHG | HEART RATE: 98 BPM | HEIGHT: 70 IN | WEIGHT: 235 LBS | SYSTOLIC BLOOD PRESSURE: 124 MMHG | RESPIRATION RATE: 16 BRPM

## 2023-07-06 DIAGNOSIS — E03.9 HYPOTHYROIDISM, UNSPECIFIED TYPE: ICD-10-CM

## 2023-07-06 DIAGNOSIS — F90.0 ATTENTION DEFICIT HYPERACTIVITY DISORDER (ADHD), PREDOMINANTLY INATTENTIVE TYPE: ICD-10-CM

## 2023-07-06 DIAGNOSIS — Z51.81 THERAPEUTIC DRUG MONITORING: Primary | ICD-10-CM

## 2023-07-06 DIAGNOSIS — E78.00 HYPERCHOLESTEREMIA: ICD-10-CM

## 2023-07-06 DIAGNOSIS — R79.89 LOW TESTOSTERONE IN MALE: ICD-10-CM

## 2023-07-06 PROCEDURE — 99214 OFFICE O/P EST MOD 30 MIN: CPT | Performed by: INTERNAL MEDICINE

## 2023-07-06 PROCEDURE — 3008F BODY MASS INDEX DOCD: CPT | Performed by: INTERNAL MEDICINE

## 2023-07-06 PROCEDURE — 3074F SYST BP LT 130 MM HG: CPT | Performed by: INTERNAL MEDICINE

## 2023-07-06 PROCEDURE — 3079F DIAST BP 80-89 MM HG: CPT | Performed by: INTERNAL MEDICINE

## 2023-07-06 RX ORDER — DEXTROAMPHETAMINE SACCHARATE, AMPHETAMINE ASPARTATE MONOHYDRATE, DEXTROAMPHETAMINE SULFATE AND AMPHETAMINE SULFATE 1.25; 1.25; 1.25; 1.25 MG/1; MG/1; MG/1; MG/1
5 CAPSULE, EXTENDED RELEASE ORAL DAILY
Qty: 30 CAPSULE | Refills: 0 | Status: SHIPPED | OUTPATIENT
Start: 2023-07-06 | End: 2023-08-05

## 2023-07-06 NOTE — TELEPHONE ENCOUNTER
Upon reviewing the chart , the patient should have 5 days left of medication. Also , looks like you have never prescribed Phentermine for patient nor for ADHD.     Please advise

## 2023-07-06 NOTE — TELEPHONE ENCOUNTER
Pt requesting a refill of Phentermine for ADHD since the can't find pharmacy that has adderall-he has taken it before-at least for now until we can find a pharmacy that has the adderall for him-send to local [facility]-    Pt has been out of meds for 2 weeks now

## 2023-07-06 NOTE — TELEPHONE ENCOUNTER
Phentermine is not appropriate for ADHD. We can send a script to any pharmacy that he finds that has Adderall.

## 2023-07-13 DIAGNOSIS — R79.89 LOW TESTOSTERONE IN MALE: Primary | ICD-10-CM

## 2023-07-13 NOTE — TELEPHONE ENCOUNTER
Patient phoned office wondering if he needed labs before next office visit 07/31. Informed patient that no labs need to be done. States he needs prescription for testosterone.      Prescription pended   Routed to Dr. Jose Ramon Saeed for review

## 2023-07-17 NOTE — TELEPHONE ENCOUNTER
RN phoned [facility] in [address] at [phone] per Dr. Nneka Black note: \"It just depends on what type of concentration solution the pharmacy carries. Sometimes they have 100mg/ml vs 200mg/ml. If pharmacy has 100mg/ml, then he won't need to waste half a bottle. Can check with pharmacy. \" Spoke with pharmacy tech Hugo who states they only have 200mg/mL.     Routed to Dr. Prema Brown.

## 2023-07-18 RX ORDER — TESTOSTERONE CYPIONATE 200 MG/ML
100 INJECTION, SOLUTION INTRAMUSCULAR
Qty: 4 ML | Refills: 2 | Status: SHIPPED | OUTPATIENT
Start: 2023-07-18

## 2023-07-18 NOTE — TELEPHONE ENCOUNTER
RN phoned patient to let him know that pharmacy confirmed they could only fill 200mg/ml. Patient verbalized understanding. Has received call from pharmacy with new prescription. Patient will call office if anything goes wrong with the prescription.

## 2023-07-31 ENCOUNTER — OFFICE VISIT (OUTPATIENT)
Facility: CLINIC | Age: 53
End: 2023-07-31
Payer: COMMERCIAL

## 2023-07-31 VITALS — OXYGEN SATURATION: 97 % | DIASTOLIC BLOOD PRESSURE: 84 MMHG | HEART RATE: 94 BPM | SYSTOLIC BLOOD PRESSURE: 126 MMHG

## 2023-07-31 DIAGNOSIS — E21.0 PRIMARY HYPERPARATHYROIDISM (HCC): Primary | ICD-10-CM

## 2023-07-31 DIAGNOSIS — E03.9 HYPOTHYROIDISM, UNSPECIFIED TYPE: ICD-10-CM

## 2023-07-31 DIAGNOSIS — R79.89 LOW TESTOSTERONE IN MALE: ICD-10-CM

## 2023-07-31 PROCEDURE — 3074F SYST BP LT 130 MM HG: CPT | Performed by: STUDENT IN AN ORGANIZED HEALTH CARE EDUCATION/TRAINING PROGRAM

## 2023-07-31 PROCEDURE — 3079F DIAST BP 80-89 MM HG: CPT | Performed by: STUDENT IN AN ORGANIZED HEALTH CARE EDUCATION/TRAINING PROGRAM

## 2023-07-31 PROCEDURE — 99214 OFFICE O/P EST MOD 30 MIN: CPT | Performed by: STUDENT IN AN ORGANIZED HEALTH CARE EDUCATION/TRAINING PROGRAM

## 2023-07-31 RX ORDER — THYROID 90 MG/1
90 TABLET ORAL DAILY
Qty: 90 TABLET | Refills: 1 | Status: SHIPPED | OUTPATIENT
Start: 2023-07-31

## 2023-07-31 RX ORDER — SYRINGE AND NEEDLE,INSULIN,1ML 25GX1"
SYRINGE, EMPTY DISPOSABLE MISCELLANEOUS
Qty: 100 EACH | Refills: 0 | Status: SHIPPED | OUTPATIENT
Start: 2023-07-31

## 2023-07-31 NOTE — PROGRESS NOTES
Return Office Visit      Today's date:  7/31/2023      HISTORY OF PRESENT ILLNESS:  Allen Dixon is a 48year old male who presents for follow up for hypothyroidism, hypogonadism and primary hyperparathyroidism. Initial consult April 2022:  HyperCa hx:  Patient states that he has been aware of mildly elevated calcium for the past few years, with recent elevated PTH brought to his attention. Denies any thiazides, lithium, excess dairy dairy. Is physically active with overall good energy. Denies any history of fractures or stones. Denies any familial hypercalcemia. Is interested in surgery if it can definitely reverse the hyperPTH. Reviewed labs:  .9  Ca 10.7-11.1 (2022)  Vit D n/a  Cr wnl  Urine Ca n/a  Hx of fx: no  Hx of stones: possibly had a small stone 'years ago'     Hypogonadism hx:  Started in [place] >10 years ago, seen a 'hormone doctor'. Previously very physically active and felt sx of hypogonadism including weight gain and fatigue. Started on gels, then switched to injections. At one point his testosterone was >1000, which he was cautioned about. Knows all the risks associated with testosterone replacement. Gets phlebotomized frequently for polycythemia. Currently takes IM testosterone 100mg twice a week   Also getting anastrozole 0.25mg twice a week (from his [address]). Understands that I will not be prescribing it. Most recent AM fasting testosterone 927.9, Hbg 17.5, hematocrit 51.1     Hypothyroid hx:  Been on [place] 1 grain daily, for >10 years now. Feels better on this than levothyroxine. Most recent TFTs wnl. Interim hx:  April 2022 - urine Ca elevated at 558  May 2022 - DXA showed normal BMD  June 2022 - Sestamibi did not identify any parathyroid adenoma. ; saw ENT  June 2022 - thyroid US showing no nodules (ordered by ENT Dr Jose Luis Marquez); plan is to repeat PTH again  July labs show mildly low fT4. Vit D at goal. H&H elevated, pt gets phlebotomy.  Testosterone level still in process. Getting IM Testosterone 100mg twice a week. Getting phlebotomized every couple of months at a lab in [place]. July 2022 - [place] at 3/4 grain, fT4 0.7, TSH 3.36.     Jan 2023 visit  8/2022 - repeat .9, Ca 11.1; pt will be following up with another ENT  1/2023 - total T 838, TSH 2.55, TT3 120, fT4 0.7, H&H 18.3/54.7; he states it used to be higher but he would do blood donations, hasn't done one recently, doesn't want to decrease dose of T  Currently on [place] 75mg daily; takes on its own in the morning without other medications  Currently IM Test 100mg twice a week  Just had R shoulder surgery, in sling    July 2023 visit  Feels well on current TRT; to prevent polycythemia, he is donating blood q6-8 weeks. States he'll need a doctor's note if he hypothetically  needs to donate more freq (eg q4 weeks)  Started on Wegovy with medical weight management clinic; lost some weight then plateaued  Feels well on current [place] dose as well  Still traveling a lot for work; stays physically active    CURRENT MEDICATION:    Current Outpatient Medications   Medication Sig Dispense Refill    testosterone cypionate 200 mg/mL Intramuscular Solution Inject 0.5 mL (100 mg total) into the muscle twice a week. 4 mL 2    semaglutide-weight management 2.4 MG/0.75ML Subcutaneous Solution Auto-injector Inject 0.75 mL (2.4 mg total) into the skin once a week. 3 mL 2    amphetamine-dextroamphetamine ER (ADDERALL XR) 5 MG Oral Capsule SR 24 Hr Take 1 capsule (5 mg total) by mouth daily. 30 capsule 0    testosterone cypionate 200 mg/mL Intramuscular Solution Inject 0.5 mL (100 mg total) into the muscle twice a week. 4 mL 2    Pancrelipase, Lip-Prot-Amyl, (ZENPEP) 67310-892156 units Oral Cap DR Particles Take 3 capsules by mouth 3 (three) times daily before meals.  Sample 4 boxes 5/25 E751 120 capsule 0    EZETIMIBE 10 MG Oral Tab TAKE 1 TABLET BY MOUTH EVERY DAY AT NIGHT 90 tablet 1    Insulin Syringe-Needle U-100 (BD INSULIN SYRINGE) 25G X 1\" 1 ML Does not apply Misc USE FOR TESTOSTERONE TWICE WEEKLY. 100 each 0    amphetamine-dextroamphetamine ER (ADDERALL XR) 5 MG Oral Capsule SR 24 Hr Take 1 capsule (5 mg total) by mouth daily. 30 capsule 0    thyroid (ARMOUR THYROID) 90 MG Oral Tab Take 1 tablet (90 mg total) by mouth daily. 90 tablet 1    cyanocobalamin 1000 MCG/ML Injection Solution Inject 1 mL (1,000 mcg total) into the muscle every 30 (thirty) days for 6 doses. 6 each 0    Insulin Syringe-Needle U-100 25G X 1\" 1 ML Does not apply Misc To be used once a month for vitamin B12 injections 6 each 0    BD DISP NEEDLE 25G X 1\" Does not apply Misc USE AS DIRECTED TWICE WEEKLY 100 each 0    Cholecalciferol (VITAMIN D3) 25 MCG (1000 UT) Oral Cap Take 2 tablets by mouth daily. 5,000 also has k-2      anastrozole 1 MG Oral Tab tab Take 1 tablet (1 mg total) by mouth twice a week. Endocrine Medications            thyroid (ARMOUR THYROID) 90 MG Oral Tab            ALLERGY:  No Known Allergies      PAST MEDICAL, SOCIAL AND FAMILY HISTORY:  See past medical history marked as reviewed. See past surgical history marked as reviewed. See past family history marked as reviewed. See past social history marked as reviewed. REVIEW OF SYSTEMS:   Ten point review of systems has been performed and is otherwise negative and/or non-contributory, except as described above. PHYSICAL EXAM:    07/31/23  1108   BP: 126/84   Pulse: 94   SpO2: 97%     BMI: There is no height or weight on file to calculate BMI. CONSTITUTIONAL:  awake, alert, cooperative, no apparent distress, and appears stated age  PSYCH: normal affect  LUNGS: breathing comfortably  CARDIOVASCULAR:  regular rate       DATA:     US THYROID (XIU=93540)    Result Date: 6/9/2022  CONCLUSION:  Heterogeneous thyroid gland.        Dictated by (CST): Jose Mondragon MD on 6/09/2022 at 4:08 PM     Finalized by (CST): Jose Mondragon MD on 6/09/2022 at 4:09 PM PROCEDURE:  NM PARATHYROID SPECT/CT (MGF=42342)       COMPARISON:  None. INDICATIONS:  E21.0 Primary hyperparathyroidism ([address]       TECHNIQUE:  The patient was administered 297 uCi I-123 by mouth, and the patient returned to the department 3 hours later. At that time, a total of 25.0 mCi Tc99m sestamibi was injected IV, and dual-photon acquisition was performed. A high resolution   large field-of-view series of images was then obtained of the neck and chest region at 30 minutes and 3 hours post injection of the sestamibi. I-123 pinhole images were computer subtracted from the sestamibi pinhole images on a dedicated nuclear medicine    workstation. Additional dedicated SPECT images were taken with concurrent CT scan for both anatomical localization as well as attenuation correction. Scan was reformatted into multi-planar reconstructions on a dedicated workstation. FINDINGS:   SPECT/CT Imaging:  No thyroid nodule or mass. I-123:  Uniform activity within a normal-sized thyroid gland       SESTAMIBI:  Uniform activity within a normal-sized thyroid gland with no mismatched activity to suggest parathyroid adenoma. Normal distribution within soft tissues, including salivary glands. 3 HOUR WASHOUT:  No evidence for ectopic sestamibi to suggest ectopic parathyroid adenoma. Normal washout from thyroid gland after 3 hours. Impression   CONCLUSION:  No evidence of parathyroid adenoma. Dictated by (CST): Jose Ruiz MD on 6/01/2022 at 2:22 PM       Finalized by (CST): Jose Ruiz MD on 6/01/2022 at 2:24 PM        PROCEDURE:  XR DEXA BONE DENSITOMETRY (CPT=77080)       COMPARISON:  None. INDICATIONS:    E21.0 Primary hyperparathyroidism ([address]       PATIENT STATED HISTORY: (As transcribed by Technologist)  Hyperparathyoidism.             LUMBAR SPINE ANALYSIS RESULTS:       Bone mineral density (BMD) (g/cm2):  1.001     Lumbar T-Score:  -0.8       % young normals: 92       % age matched controls:  95       Change from prior spine examination:  n/a                TOTAL HIP ANALYSIS RESULTS:         Bone mineral density (BMD) (g/cm2):  1.053       Total Hip T-Score:  0.1       % young normals:  102       % age matched controls:  107       Change from prior hip examination:  n/a                FEMORAL NECK ANALYSIS RESULTS:         Bone mineral density (BMD) (g/cm2):  0.868       Femoral neck T-Score:  -0.5       % young normals:  93       % age matched controls:  105       Change from prior hip examination:  n/a       LEFT FOREARM ANALYSIS:   Bone mineral density:  0.892         T-score:  1.4     % young normal:  109   % age match controls:  80            ADDITIONAL FINDINGS:  The Z-scores in left femoral neck, left total hip, lumbar spine and left forearm are 0.3, 0.4, -0.5 and 1.9 respectively              Impression   CONCLUSION:  Bone mineral density is in the normal range. ASSESSMENT AND PLAN:    (E21.0) Primary hyperparathyroidism ([address]  (primary encounter diagnosis)  Plan:   - elevated PTH, elevated Ca, elevated 24hr urine Ca c/w primary hyperPTH  - sestamibi and thyroid US in May 2022 shows no adenoma  - DXA 2022 with normal BMD  - pt has seen ENT Dr Rubio Pina   - continue medical monitoring; pt open to surgical intervention down the line if meets surgical criteria  - labs q6 months    (R79.89) Low testosterone in male  Plan: testosterone cypionate 200 MG/ML Intramuscular         Solution  - cont IM test 100mg twice a week   - Testosterone at goal  - discussed the cardiovascular risks of erythrocytosis in the setting of TRT; pt open to seeing hematology if H&H uptrends, currently stable     (E03.9) Hypothyroidism, unspecified type  Plan: clinically and biochemically euthyroid  - continue [place] 90mg daily  - TFTs q6 months      No orders of the defined types were placed in this encounter. No follow-ups on file.   Return to Clinic in 6 months    A total of 30 minutes was spent today on obtaining history, reviewing pertinent labs, evaluating patient, providing multiple treatment options, reinforcing diet/exercise and compliance, and completing documentation.      7/31/2023  Von Luong MD

## 2023-08-09 DIAGNOSIS — E03.9 HYPOTHYROIDISM, UNSPECIFIED TYPE: Primary | ICD-10-CM

## 2023-08-09 RX ORDER — THYROID,PORK 90 MG
90 TABLET ORAL DAILY
Qty: 90 TABLET | Refills: 1 | OUTPATIENT
Start: 2023-08-09

## 2023-08-09 RX ORDER — THYROID 90 MG/1
90 TABLET ORAL DAILY
Qty: 90 TABLET | Refills: 1 | Status: SHIPPED | OUTPATIENT
Start: 2023-08-09

## 2023-08-09 NOTE — TELEPHONE ENCOUNTER
LOV: 7/31/23    RTC: 6 months    FU: 2/1/24    Last Refill: 7/31/23- for total of 6 month supply    Refill request too soon- denied.

## 2023-08-09 NOTE — TELEPHONE ENCOUNTER
Received call from patient stating \"I only have 1 pill left of my [place] and my pharmacy said a refill was denied. \"    RN phoned pharmacy and spoke with staff member Shellie Diamond who states \"we have no prescription in the system for [place] Thyroid 90 MG. \"    Confirmed that on our end, prescription was sent over on 7/31 with -confirmed receipt by pharmacy.     New RX pended and routed to Dr. Caryle Jesus.

## 2023-09-15 ENCOUNTER — PATIENT MESSAGE (OUTPATIENT)
Facility: CLINIC | Age: 53
End: 2023-09-15

## 2023-09-15 DIAGNOSIS — E03.9 HYPOTHYROIDISM, UNSPECIFIED TYPE: Primary | ICD-10-CM

## 2023-09-18 RX ORDER — THYROID,PORK 90 MG
90 TABLET ORAL DAILY
Qty: 90 TABLET | Refills: 1 | Status: SHIPPED | OUTPATIENT
Start: 2023-09-18

## 2023-10-06 ENCOUNTER — TELEPHONE (OUTPATIENT)
Facility: CLINIC | Age: 53
End: 2023-10-06

## 2023-10-06 NOTE — TELEPHONE ENCOUNTER
10/6/23-Received via fax from "[employer] [phone] a Rx clarification for testosterone cypionate 200mg/ml. Given to RN to review.

## 2023-10-26 ENCOUNTER — OFFICE VISIT (OUTPATIENT)
Dept: INTERNAL MEDICINE CLINIC | Facility: CLINIC | Age: 53
End: 2023-10-26

## 2023-10-26 VITALS
WEIGHT: 250 LBS | DIASTOLIC BLOOD PRESSURE: 80 MMHG | RESPIRATION RATE: 16 BRPM | HEART RATE: 88 BPM | SYSTOLIC BLOOD PRESSURE: 124 MMHG | BODY MASS INDEX: 35.79 KG/M2 | HEIGHT: 70 IN

## 2023-10-26 DIAGNOSIS — E78.00 HYPERCHOLESTEREMIA: Primary | ICD-10-CM

## 2023-10-26 DIAGNOSIS — Z51.81 THERAPEUTIC DRUG MONITORING: ICD-10-CM

## 2023-10-26 DIAGNOSIS — E66.9 CLASS II OBESITY: ICD-10-CM

## 2023-10-26 PROCEDURE — 99214 OFFICE O/P EST MOD 30 MIN: CPT | Performed by: INTERNAL MEDICINE

## 2023-10-26 PROCEDURE — 3008F BODY MASS INDEX DOCD: CPT | Performed by: INTERNAL MEDICINE

## 2023-10-26 PROCEDURE — 3079F DIAST BP 80-89 MM HG: CPT | Performed by: INTERNAL MEDICINE

## 2023-10-26 PROCEDURE — 3074F SYST BP LT 130 MM HG: CPT | Performed by: INTERNAL MEDICINE

## 2023-10-26 RX ORDER — METHYLPHENIDATE HYDROCHLORIDE 5 MG/1
TABLET ORAL DAILY PRN
COMMUNITY
Start: 2023-10-03

## 2023-10-26 RX ORDER — METHYLPHENIDATE HYDROCHLORIDE 20 MG/1
20 CAPSULE, EXTENDED RELEASE ORAL EVERY MORNING
COMMUNITY
Start: 2023-09-25

## 2023-10-26 RX ORDER — CARBOXYMETHYLCELLULOSE/CITRIC 0.75 G
3 CAPSULE ORAL
Qty: 180 CAPSULE | Refills: 1 | Status: SHIPPED | OUTPATIENT
Start: 2023-10-26

## 2023-10-26 RX ORDER — SYRINGE, DISPOSABLE, 1 ML
SYRINGE, EMPTY DISPOSABLE MISCELLANEOUS
COMMUNITY
Start: 2023-08-06

## 2023-10-31 ENCOUNTER — OFFICE VISIT (OUTPATIENT)
Dept: FAMILY MEDICINE CLINIC | Facility: CLINIC | Age: 53
End: 2023-10-31

## 2023-10-31 VITALS
BODY MASS INDEX: 35.79 KG/M2 | SYSTOLIC BLOOD PRESSURE: 127 MMHG | RESPIRATION RATE: 16 BRPM | OXYGEN SATURATION: 98 % | DIASTOLIC BLOOD PRESSURE: 86 MMHG | HEIGHT: 70 IN | HEART RATE: 92 BPM | TEMPERATURE: 97 F | WEIGHT: 250 LBS

## 2023-10-31 DIAGNOSIS — J01.40 ACUTE NON-RECURRENT PANSINUSITIS: Primary | ICD-10-CM

## 2023-10-31 DIAGNOSIS — R09.81 NASAL CONGESTION: ICD-10-CM

## 2023-10-31 PROCEDURE — 99213 OFFICE O/P EST LOW 20 MIN: CPT | Performed by: NURSE PRACTITIONER

## 2023-10-31 PROCEDURE — 87635 SARS-COV-2 COVID-19 AMP PRB: CPT | Performed by: NURSE PRACTITIONER

## 2023-10-31 PROCEDURE — 3074F SYST BP LT 130 MM HG: CPT | Performed by: NURSE PRACTITIONER

## 2023-10-31 PROCEDURE — 3079F DIAST BP 80-89 MM HG: CPT | Performed by: NURSE PRACTITIONER

## 2023-10-31 PROCEDURE — 3008F BODY MASS INDEX DOCD: CPT | Performed by: NURSE PRACTITIONER

## 2023-10-31 RX ORDER — CYANOCOBALAMIN 1000 UG/ML
1000 INJECTION, SOLUTION INTRAMUSCULAR; SUBCUTANEOUS
Qty: 3 ML | Refills: 1 | OUTPATIENT
Start: 2023-10-31 | End: 2024-03-30

## 2023-10-31 RX ORDER — AMOXICILLIN AND CLAVULANATE POTASSIUM 875; 125 MG/1; MG/1
1 TABLET, FILM COATED ORAL 2 TIMES DAILY
Qty: 20 TABLET | Refills: 0 | Status: SHIPPED | OUTPATIENT
Start: 2023-10-31 | End: 2023-10-31

## 2023-10-31 RX ORDER — AMOXICILLIN AND CLAVULANATE POTASSIUM 875; 125 MG/1; MG/1
1 TABLET, FILM COATED ORAL 2 TIMES DAILY
Qty: 20 TABLET | Refills: 0 | Status: SHIPPED | OUTPATIENT
Start: 2023-10-31 | End: 2023-11-10

## 2023-10-31 NOTE — TELEPHONE ENCOUNTER
Requesting   Requested Prescriptions     Pending Prescriptions Disp Refills    CYANOCOBALAMIN 1000 MCG/ML Injection Solution [Pharmacy Med Name: CYANOCOBALAMIN 1,000 MCG/ML VL] 3 mL 1     Sig: INJECT 1 ML (1,000 MCG TOTAL) INTO THE MUSCLE EVERY 30 (THIRTY) DAYS FOR 6 DOSES.     LOV: 10/26/23  RTC: not noted  Filled: 5/9/23 #6 with 0 refills    Future Appointments   Date Time Provider hospitals   11/13/2023  8:00 AM Jay Fishman MD [facility]   2/1/2024  3:20 PM Abbi Chi MD [facility] [place]     Therapy ended

## 2023-11-01 LAB — SARS-COV-2 RNA RESP QL NAA+PROBE: NOT DETECTED

## 2023-11-03 ENCOUNTER — TELEPHONE (OUTPATIENT)
Dept: ENDOCRINOLOGY CLINIC | Facility: CLINIC | Age: 53
End: 2023-11-03

## 2023-11-03 ENCOUNTER — LAB ENCOUNTER (OUTPATIENT)
Dept: LAB | Age: 53
End: 2023-11-03
Attending: FAMILY MEDICINE
Payer: COMMERCIAL

## 2023-11-03 DIAGNOSIS — I25.10 CORONARY ARTERY CALCIFICATION: ICD-10-CM

## 2023-11-03 DIAGNOSIS — I25.84 CORONARY ARTERY CALCIFICATION: ICD-10-CM

## 2023-11-03 DIAGNOSIS — E78.00 HYPERCHOLESTEREMIA: ICD-10-CM

## 2023-11-03 DIAGNOSIS — E21.0 PRIMARY HYPERPARATHYROIDISM (HCC): ICD-10-CM

## 2023-11-03 DIAGNOSIS — Z78.9 STATIN INTOLERANCE: ICD-10-CM

## 2023-11-03 LAB
CALCIUM BLD-MCNC: 12 MG/DL (ref 8.5–10.1)
CHOLEST SERPL-MCNC: 156 MG/DL (ref ?–200)
FASTING PATIENT LIPID ANSWER: YES
HDLC SERPL-MCNC: 25 MG/DL (ref 40–59)
LDLC SERPL CALC-MCNC: 93 MG/DL (ref ?–100)
NONHDLC SERPL-MCNC: 131 MG/DL (ref ?–130)
PTH-INTACT SERPL-MCNC: 69.3 PG/ML (ref 18.5–88)
TRIGL SERPL-MCNC: 223 MG/DL (ref 30–149)
VIT D+METAB SERPL-MCNC: 18.8 NG/ML (ref 30–100)
VLDLC SERPL CALC-MCNC: 37 MG/DL (ref 0–30)

## 2023-11-03 PROCEDURE — 82306 VITAMIN D 25 HYDROXY: CPT

## 2023-11-03 PROCEDURE — 83970 ASSAY OF PARATHORMONE: CPT

## 2023-11-03 PROCEDURE — 82310 ASSAY OF CALCIUM: CPT

## 2023-11-03 PROCEDURE — 36415 COLL VENOUS BLD VENIPUNCTURE: CPT

## 2023-11-03 PROCEDURE — 80061 LIPID PANEL: CPT

## 2023-11-03 NOTE — TELEPHONE ENCOUNTER
Notice from [facility] GRANADOS stating wegovy 1.7 mg needs prior authorization  Cannot read card scanned into chart  Will try to enter in epic

## 2023-11-03 NOTE — TELEPHONE ENCOUNTER
Pt walked in to bring letter from [facility] with info about VIVIEN SINGH

## 2023-11-06 ENCOUNTER — TELEPHONE (OUTPATIENT)
Facility: CLINIC | Age: 53
End: 2023-11-06

## 2023-11-06 DIAGNOSIS — E21.0 PRIMARY HYPERPARATHYROIDISM (HCC): Primary | ICD-10-CM

## 2023-11-06 DIAGNOSIS — R79.89 LOW TESTOSTERONE IN MALE: ICD-10-CM

## 2023-11-06 DIAGNOSIS — E03.9 HYPOTHYROIDISM, UNSPECIFIED TYPE: ICD-10-CM

## 2023-11-06 RX ORDER — ERGOCALCIFEROL 1.25 MG/1
50000 CAPSULE ORAL WEEKLY
Qty: 4 CAPSULE | Refills: 0 | Status: CANCELLED | OUTPATIENT
Start: 2023-11-06 | End: 2023-12-06

## 2023-11-06 NOTE — TELEPHONE ENCOUNTER
Answer from Dr. Ehsan Pitts: 1) antibiotic shouldn't have impacted high Ca  2) vit D: if going the OTC route, would take vit D3 5000IU daily and repeat in 3 months. Both prescription and OTC work, just how quickly. Whichever he elects is [facility] sent to patient.

## 2023-11-06 NOTE — TELEPHONE ENCOUNTER
Result note: - vit D low; would recommend 3 month prescription-dose, rehan going into winter with less sun. Repeat in 3 months  - Ca is higher than before - I know he's been seeing an ENT already; would recommend him to consider surgical intervention, he meets criteria  - please add on total Testosterone, PSA, CBC, TFTs to next set of labs in 3 months    RN phoned patient per result note- Patient stated he had not been taking his Vitmain D over the counter and was wondering if he stated taking this could this help? Also wanted Dr. Carmen Sun to know that he was on antibiotics during lab test, unsure if this could cause issue. Verbalized understanding to follow up with ENT on this. Will repeat labs in 3 months. Pended future labs and RX, routed with questions for review.

## 2023-11-13 ENCOUNTER — OFFICE VISIT (OUTPATIENT)
Dept: INTERNAL MEDICINE CLINIC | Facility: CLINIC | Age: 53
End: 2023-11-13
Payer: COMMERCIAL

## 2023-11-13 VITALS
WEIGHT: 242 LBS | HEART RATE: 92 BPM | HEIGHT: 70 IN | RESPIRATION RATE: 20 BRPM | TEMPERATURE: 97 F | BODY MASS INDEX: 34.65 KG/M2 | OXYGEN SATURATION: 98 % | SYSTOLIC BLOOD PRESSURE: 130 MMHG | DIASTOLIC BLOOD PRESSURE: 73 MMHG

## 2023-11-13 DIAGNOSIS — R79.89 LOW TESTOSTERONE IN MALE: ICD-10-CM

## 2023-11-13 DIAGNOSIS — J01.90 SUBACUTE SINUSITIS, UNSPECIFIED LOCATION: ICD-10-CM

## 2023-11-13 DIAGNOSIS — E21.0 PRIMARY HYPERPARATHYROIDISM (HCC): Primary | ICD-10-CM

## 2023-11-13 DIAGNOSIS — Z78.9 STATIN INTOLERANCE: ICD-10-CM

## 2023-11-13 DIAGNOSIS — I25.10 CORONARY ARTERY CALCIFICATION: ICD-10-CM

## 2023-11-13 DIAGNOSIS — Z12.11 SCREENING FOR MALIGNANT NEOPLASM OF COLON: ICD-10-CM

## 2023-11-13 DIAGNOSIS — E78.00 HYPERCHOLESTEREMIA: ICD-10-CM

## 2023-11-13 DIAGNOSIS — E66.9 OBESITY (BMI 30-39.9): ICD-10-CM

## 2023-11-13 DIAGNOSIS — I25.84 CORONARY ARTERY CALCIFICATION: ICD-10-CM

## 2023-11-13 PROCEDURE — 3078F DIAST BP <80 MM HG: CPT | Performed by: FAMILY MEDICINE

## 2023-11-13 PROCEDURE — 3008F BODY MASS INDEX DOCD: CPT | Performed by: FAMILY MEDICINE

## 2023-11-13 PROCEDURE — 99214 OFFICE O/P EST MOD 30 MIN: CPT | Performed by: FAMILY MEDICINE

## 2023-11-13 PROCEDURE — 3075F SYST BP GE 130 - 139MM HG: CPT | Performed by: FAMILY MEDICINE

## 2023-11-13 RX ORDER — DOXYCYCLINE HYCLATE 100 MG/1
100 CAPSULE ORAL 2 TIMES DAILY
Qty: 14 CAPSULE | Refills: 0 | Status: SHIPPED | OUTPATIENT
Start: 2023-11-13 | End: 2023-11-20

## 2023-11-13 RX ORDER — METHYLPREDNISOLONE 4 MG/1
TABLET ORAL
Qty: 1 EACH | Refills: 0 | Status: SHIPPED | OUTPATIENT
Start: 2023-11-13

## 2023-11-16 ENCOUNTER — OFFICE VISIT (OUTPATIENT)
Dept: OTOLARYNGOLOGY | Facility: CLINIC | Age: 53
End: 2023-11-16

## 2023-11-16 VITALS — WEIGHT: 240 LBS | HEIGHT: 70 IN | BODY MASS INDEX: 34.36 KG/M2

## 2023-11-16 DIAGNOSIS — E21.0 PRIMARY HYPERPARATHYROIDISM (HCC): ICD-10-CM

## 2023-11-16 DIAGNOSIS — E83.52 HYPERCALCEMIA: Primary | ICD-10-CM

## 2023-11-16 DIAGNOSIS — D35.1 PARATHYROID ADENOMA: ICD-10-CM

## 2023-11-16 PROCEDURE — 3008F BODY MASS INDEX DOCD: CPT | Performed by: STUDENT IN AN ORGANIZED HEALTH CARE EDUCATION/TRAINING PROGRAM

## 2023-11-16 PROCEDURE — 99213 OFFICE O/P EST LOW 20 MIN: CPT | Performed by: STUDENT IN AN ORGANIZED HEALTH CARE EDUCATION/TRAINING PROGRAM

## 2023-11-16 NOTE — PROGRESS NOTES
[facility]  OTOLARYNGOLOGY - HEAD & NECK SURGERY    11/16/2023     Reason for Consultation:   Parathyroid adenoma    History of Present Illness:   Patient is a pleasant 48year old male who is being seen for hyperparathyroidism and hypercalcemia over the past year. He was previously seen by Dr. Thom Gillette and had thyroid ultrasound as well as sestamibi which was non-localizing. He has not had 4D CT done before. His most recent PTH was 69 and Ca 12. He previously has had PTH levels over 120. He states he has passed some small kidney stones. He was sent to me for second opinion regarding his likely adenoma. He also has a history of hypothyroidism and is on armour thyroid    Past Medical History  Past Medical History:   Diagnosis Date    Attention deficit hyperactivity disorder (ADHD)     COVID-19 11/12/2022    fatigue    Disorder of thyroid     High cholesterol     Hyperlipidemia     Low testosterone     Thyroid disease     Visual impairment     glasses       Past Surgical History  Past Surgical History:   Procedure Laterality Date    OTHER SURGICAL HISTORY      ROTATOR CUFF REPAIR Right 01/2023       Family History  Family History   Problem Relation Age of Onset    Heart Disorder Father         Cardiac bypass    No Known Problems Mother        Social History  Pediatric History   Patient Parents    Not on file     Other Topics Concern    Caffeine Concern Yes     Comment: coffee- 3 cups a day,Sugar free Redbull - couple a week, Black tea Once a day    Exercise Not Asked    Seat Belt Not Asked    Special Diet Not Asked    Stress Concern Not Asked    Weight Concern Not Asked   Social History Narrative    Not on file           Current Medications:  Current Outpatient Medications   Medication Sig Dispense Refill    doxycycline 100 MG Oral Cap Take 1 capsule (100 mg total) by mouth 2 (two) times daily for 7 days. 14 capsule 0    methylPREDNISolone (MEDROL) 4 MG Oral Tablet Therapy Pack As directed.  1 each 0    ergocalciferol 1.25 MG (32952 UT) Oral Cap Take 1 capsule (50,000 Units total) by mouth once a week. 12 capsule 0    methylphenidate ER 20 MG Oral Capsule SR 24 Hr Take 1 capsule (20 mg total) by mouth every morning. methylphenidate 5 MG Oral Tab Take 1-2 tablets (5-10 mg total) by mouth daily as needed. IN THE AFTERNOON      BD SYRINGE SLIP TIP 1 ML Does not apply Misc USE FOR TESTOSTERONE TWICE WEEKLY. semaglutide-weight management 1 MG/0.5ML Subcutaneous Solution Auto-injector Inject 0.5 mL (1 mg total) into the skin once a week for 4 doses. 2 mL 0    semaglutide-weight management 1.7 MG/0.75ML Subcutaneous Solution Auto-injector Inject 0.75 mL (1.7 mg total) into the skin once a week for 28 days. 3 mL 0    Carboxymeth-Cellulose-CitricAc (PLENITY) Oral Cap Take 3 capsules by mouth 2 (two) times daily before meals. 180 capsule 1    testosterone cypionate 200 mg/mL Intramuscular Solution Inject 0.5 mL (100 mg total) into the muscle twice a week. 26 mL 0    ARMOUR THYROID 90 MG Oral Tab Take 1 tablet (90 mg total) by mouth daily. 90 tablet 1    thyroid (ARMOUR THYROID) 90 MG Oral Tab Take 1 tablet (90 mg total) by mouth daily. 90 tablet 1    Insulin Syringe-Needle U-100 (BD INSULIN SYRINGE) 25G X 1\" 1 ML Does not apply Misc USE FOR TESTOSTERONE TWICE WEEKLY. 100 each 0    Syringe 18G X 1-1/2\" 3 ML Does not apply Misc Draw up testosterone twice a week 100 each 1    thyroid (ARMOUR THYROID) 90 MG Oral Tab Take 1 tablet (90 mg total) by mouth daily. 90 tablet 1    Pancrelipase, Lip-Prot-Amyl, (ZENPEP) [id number] units Oral Cap DR Particles Take 3 capsules by mouth 3 (three) times daily before meals.  Sample 4 boxes 5/25 E751 120 capsule 0    EZETIMIBE 10 MG Oral Tab TAKE 1 TABLET BY MOUTH EVERY DAY AT NIGHT 90 tablet 1    Insulin Syringe-Needle U-100 25G X 1\" 1 ML Does not apply Misc To be used once a month for vitamin B12 injections 6 each 0    BD DISP NEEDLE 25G X 1\" Does not apply Misc USE AS DIRECTED TWICE WEEKLY 100 each 0 Cholecalciferol (VITAMIN D3) 25 MCG (1000 UT) Oral Cap Take 2 tablets by mouth daily. 5,000 also has k-2         Allergies  No Known Allergies    Review of Systems:   A comprehensive 10 point review of systems was completed. Pertinent positives and negatives noted in the the HPI. Physical Exam:   Height 5' 10\" (1.778 m), weight 240 lb (108.9 kg). GENERAL: No acute distress, Comfortable appearing  FACE: HB 1/6, Normal Animation  HEAD: Normocephalic  EYES: EOMI, pupils equil  EARS: Bilateral Auricles Symmetric, tympanic membranes normal  NOSE: Nares patent bilaterally  ORAL CAVITY: Tongue mobile, Oropharynx clear, Floor of mouth clear, Posterior oropharynx normal  NECK: No palpable lymphadenopathy, thyroid not palpable, nontender    Results:     Laboratory Data:  Lab Results   Component Value Date    WBC 6.6 04/10/2023    HGB 16.3 04/10/2023    HCT 49.7 04/10/2023    .0 04/10/2023    CREATSERUM 1.13 03/31/2022    BUN 14 03/31/2022     03/31/2022    K 4.5 03/31/2022     03/31/2022    CO2 29.0 03/31/2022    GLU 96 03/31/2022    CA 12.0 (H) 11/03/2023    ALB 3.9 03/25/2022    ALKPHO 68 03/25/2022    TP 6.8 03/25/2022    AST 26 03/25/2022    ALT 42 03/25/2022    T4F 0.8 05/05/2023    TSH 0.772 05/05/2023    PSA 1.86 01/13/2023    B12 257 05/05/2023         Imaging:  No results found. Impression:   Primary hyperparathyroidism  Parathyroid adenoma    Recommendations:  Patient has elevated Ca (most recent 12) with PTH of 69, although previously had PTH of 120. He has had sestamibi and ultrasound which were unable to localize an adenoma  I discussed with him that there are instances where a 4D CT scan will  a previously non localized adenoma  I will have him obtain a 4D CT and we will plan for surgery if this is well localized    Thank you for allowing me to participate in the care of your patient.     Adalid Ballard,    Otolaryngology/Rhinology, Sinus, and Endoscopic Skull Base Surgery  [facility]   [address]  Phone [phone]  Fax [phone]  11/16/2023  4:34 PM  11/16/2023

## 2023-11-16 NOTE — TELEPHONE ENCOUNTER
Card is Prime  ID  [id number]  BIN   [id number]  PCN  ILDR  GROUP   [id number]    Entered PA for Wegovy 1.7 mg on CMM  Awaiting decision    Maurilio Corral ([place]: [address])

## 2023-11-19 DIAGNOSIS — E78.00 HYPERCHOLESTEREMIA: ICD-10-CM

## 2023-11-21 RX ORDER — EZETIMIBE 10 MG/1
TABLET ORAL
Qty: 90 TABLET | Refills: 1 | Status: SHIPPED | OUTPATIENT
Start: 2023-11-21

## 2023-11-26 ENCOUNTER — HOSPITAL ENCOUNTER (OUTPATIENT)
Dept: CT IMAGING | Age: 53
Discharge: HOME | End: 2023-11-26
Attending: STUDENT IN AN ORGANIZED HEALTH CARE EDUCATION/TRAINING PROGRAM
Payer: COMMERCIAL

## 2023-11-26 ENCOUNTER — HOSPITAL ENCOUNTER (OUTPATIENT)
Dept: CT IMAGING | Age: 53
End: 2023-11-26
Attending: STUDENT IN AN ORGANIZED HEALTH CARE EDUCATION/TRAINING PROGRAM
Payer: COMMERCIAL

## 2023-11-26 DIAGNOSIS — D35.1 PARATHYROID ADENOMA: ICD-10-CM

## 2023-11-26 DIAGNOSIS — E83.52 HYPERCALCEMIA: ICD-10-CM

## 2023-11-26 DIAGNOSIS — E21.0 PRIMARY HYPERPARATHYROIDISM (HCC): ICD-10-CM

## 2023-11-26 PROCEDURE — 70492 CT SFT TSUE NCK W/O & W/DYE: CPT | Performed by: STUDENT IN AN ORGANIZED HEALTH CARE EDUCATION/TRAINING PROGRAM

## 2023-12-04 ENCOUNTER — TELEPHONE (OUTPATIENT)
Dept: INTERNAL MEDICINE CLINIC | Facility: CLINIC | Age: 53
End: 2023-12-04

## 2023-12-04 ENCOUNTER — OFFICE VISIT (OUTPATIENT)
Dept: FAMILY MEDICINE CLINIC | Facility: CLINIC | Age: 53
End: 2023-12-04
Payer: COMMERCIAL

## 2023-12-04 VITALS
OXYGEN SATURATION: 98 % | HEART RATE: 72 BPM | SYSTOLIC BLOOD PRESSURE: 147 MMHG | WEIGHT: 243.5 LBS | TEMPERATURE: 98 F | HEIGHT: 70 IN | DIASTOLIC BLOOD PRESSURE: 102 MMHG | RESPIRATION RATE: 18 BRPM | BODY MASS INDEX: 34.86 KG/M2

## 2023-12-04 DIAGNOSIS — R03.0 ELEVATED BLOOD PRESSURE READING: ICD-10-CM

## 2023-12-04 DIAGNOSIS — J06.9 VIRAL URI WITH COUGH: Primary | ICD-10-CM

## 2023-12-04 LAB
OPERATOR ID: NORMAL
POCT LOT NUMBER: NORMAL
RAPID SARS-COV-2 BY PCR: NOT DETECTED

## 2023-12-04 NOTE — TELEPHONE ENCOUNTER
Pt was seen 2 weeks ago is still experiencing sore throat,watery eyes,dry cough took an at home covid test this morning tested negative was wanted to come in this morning or afternoon to see [facility] he has a meeting in the evening and wanted medication prior to that pt was offered an appointment for tomorrow with Mk and pt declined

## 2023-12-09 DIAGNOSIS — E03.9 HYPOTHYROIDISM, UNSPECIFIED TYPE: ICD-10-CM

## 2023-12-11 RX ORDER — THYROID,PORK 90 MG
90 TABLET ORAL DAILY
Qty: 90 TABLET | Refills: 1 | OUTPATIENT
Start: 2023-12-11

## 2023-12-11 NOTE — TELEPHONE ENCOUNTER
LOV: 7/31/23    RTC: 6 months     FU: scheduled 2/5/24    Last Refill: 8/9/23- 6 month supply     Per LOV notes from 7/31/23:  \"- continue [place] 90mg daily  - TFTs q6 months\"      Phoned patient to discuss if refill needed, states he may have fill remaining at pharmacy.  States he thinks he may be \"low\". He is experiencing tiredness. Notes has been having this since he started taking Vitamin D- read online that the Vitamin D could be causing decrease effect of thyroid medication? Wondering if this is possible? Also states he has started having \"ridges\" on nails. Started on one thumb nail, spread to other, and now beginning on all nails.    Reviewed in Epic- there are thyroid labs that patient could complete, states he will do that this week.    Phoned patient's pharmacy- confirmed he has remaining fill on file, refill denied.    Routed to Dr. Oneil to review patient symptoms.

## 2023-12-14 ENCOUNTER — LAB ENCOUNTER (OUTPATIENT)
Dept: LAB | Age: 53
End: 2023-12-14
Attending: STUDENT IN AN ORGANIZED HEALTH CARE EDUCATION/TRAINING PROGRAM
Payer: COMMERCIAL

## 2023-12-14 DIAGNOSIS — E21.0 PRIMARY HYPERPARATHYROIDISM (HCC): ICD-10-CM

## 2023-12-14 DIAGNOSIS — R79.89 LOW TESTOSTERONE IN MALE: ICD-10-CM

## 2023-12-14 DIAGNOSIS — E03.9 HYPOTHYROIDISM, UNSPECIFIED TYPE: ICD-10-CM

## 2023-12-14 LAB
COMPLEXED PSA SERPL-MCNC: 2.04 NG/ML (ref ?–4)
T3 SERPL-MCNC: 113 NG/DL (ref 60–181)
T4 FREE SERPL-MCNC: 0.7 NG/DL (ref 0.8–1.7)
TSI SER-ACNC: 2.59 MIU/ML (ref 0.36–3.74)
VIT D+METAB SERPL-MCNC: 36.5 NG/ML (ref 30–100)

## 2023-12-14 PROCEDURE — 84480 ASSAY TRIIODOTHYRONINE (T3): CPT

## 2023-12-14 PROCEDURE — 82306 VITAMIN D 25 HYDROXY: CPT

## 2023-12-14 PROCEDURE — 84443 ASSAY THYROID STIM HORMONE: CPT

## 2023-12-14 PROCEDURE — 36415 COLL VENOUS BLD VENIPUNCTURE: CPT

## 2023-12-14 PROCEDURE — 84439 ASSAY OF FREE THYROXINE: CPT

## 2023-12-15 ENCOUNTER — OFFICE VISIT (OUTPATIENT)
Dept: INTERNAL MEDICINE CLINIC | Facility: CLINIC | Age: 53
End: 2023-12-15
Payer: COMMERCIAL

## 2023-12-15 VITALS
WEIGHT: 244.38 LBS | DIASTOLIC BLOOD PRESSURE: 88 MMHG | HEIGHT: 70 IN | BODY MASS INDEX: 34.99 KG/M2 | RESPIRATION RATE: 18 BRPM | SYSTOLIC BLOOD PRESSURE: 132 MMHG | OXYGEN SATURATION: 96 % | TEMPERATURE: 97 F | HEART RATE: 93 BPM

## 2023-12-15 DIAGNOSIS — E55.9 VITAMIN D DEFICIENCY: ICD-10-CM

## 2023-12-15 DIAGNOSIS — E83.52 HYPERCALCEMIA: ICD-10-CM

## 2023-12-15 DIAGNOSIS — R03.0 ELEVATED BLOOD PRESSURE READING: Primary | ICD-10-CM

## 2024-01-02 DIAGNOSIS — E03.9 HYPOTHYROIDISM, UNSPECIFIED TYPE: Primary | ICD-10-CM

## 2024-01-02 RX ORDER — THYROID 15 MG/1
15 TABLET ORAL DAILY
Qty: 30 TABLET | Refills: 0 | Status: SHIPPED | OUTPATIENT
Start: 2024-01-02

## 2024-01-02 NOTE — TELEPHONE ENCOUNTER
Spoke with patient on telephone re: result notes from labs dated 12/14/23.  He verbalized understanding, and he states [place] is his preference.     He will start doing extra 15mg of [place] daily- but he states will need refill prior to f/u appointment on 2/2/24.    Asking for 1 month supply to [facility] pharmacy in [place].     Pended to Dr. Oneil.  
no

## 2024-01-13 DIAGNOSIS — R79.89 LOW TESTOSTERONE IN MALE: ICD-10-CM

## 2024-01-15 ENCOUNTER — TELEPHONE (OUTPATIENT)
Facility: CLINIC | Age: 54
End: 2024-01-15

## 2024-01-15 DIAGNOSIS — R79.89 LOW TESTOSTERONE IN MALE: Primary | ICD-10-CM

## 2024-01-15 RX ORDER — TESTOSTERONE CYPIONATE 200 MG/ML
100 INJECTION, SOLUTION INTRAMUSCULAR
Qty: 26 ML | Refills: 1 | Status: SHIPPED | OUTPATIENT
Start: 2024-01-15

## 2024-01-15 NOTE — TELEPHONE ENCOUNTER
RN phoned patient, per note form Dr. Oneil on Testosterone refill.    Patient confirms he will get labs done before 2/2. Patient requests Estradiol to be added for testing. Pended lab for review.    We do not have updated insurance for patient, he is to send a [facility] with pictures of front and back of new card.     Will await MCM for patient.

## 2024-01-15 NOTE — TELEPHONE ENCOUNTER
LOV:    RTC:    FU:     Last Refill:10/13    Month Supply Pendin days supply    Last office visit note: - cont IM test 100mg twice a week     Refill pended and routed for review.

## 2024-01-15 NOTE — TELEPHONE ENCOUNTER
1/15/24-Received via fax from [facility] a PA for Testosterone Cypionate 200mg/ml solution.  Placed in PA in basket.

## 2024-01-15 NOTE — TELEPHONE ENCOUNTER
RN phoned patient to remind him to have labs done before 2/2 appointment.    Patient verbalized understanding.

## 2024-01-22 ENCOUNTER — LAB ENCOUNTER (OUTPATIENT)
Dept: LAB | Age: 54
End: 2024-01-22
Attending: STUDENT IN AN ORGANIZED HEALTH CARE EDUCATION/TRAINING PROGRAM
Payer: COMMERCIAL

## 2024-01-22 DIAGNOSIS — R79.89 LOW TESTOSTERONE IN MALE: ICD-10-CM

## 2024-01-22 LAB
ERYTHROCYTE [DISTWIDTH] IN BLOOD BY AUTOMATED COUNT: 13.3 %
ESTRADIOL SERPL-MCNC: 32.1 PG/ML
HCT VFR BLD AUTO: 50.5 %
HGB BLD-MCNC: 16.7 G/DL
MCH RBC QN AUTO: 30 PG (ref 26–34)
MCHC RBC AUTO-ENTMCNC: 33.1 G/DL (ref 31–37)
MCV RBC AUTO: 90.8 FL
PLATELET # BLD AUTO: 250 10(3)UL (ref 150–450)
RBC # BLD AUTO: 5.56 X10(6)UL
TESTOST SERPL-MCNC: 686.2 NG/DL
WBC # BLD AUTO: 6.3 X10(3) UL (ref 4–11)

## 2024-01-22 PROCEDURE — 36415 COLL VENOUS BLD VENIPUNCTURE: CPT

## 2024-01-22 PROCEDURE — 82670 ASSAY OF TOTAL ESTRADIOL: CPT

## 2024-01-22 PROCEDURE — 85027 COMPLETE CBC AUTOMATED: CPT

## 2024-01-22 PROCEDURE — 84403 ASSAY OF TOTAL TESTOSTERONE: CPT

## 2024-02-02 ENCOUNTER — TELEPHONE (OUTPATIENT)
Facility: CLINIC | Age: 54
End: 2024-02-02

## 2024-02-02 ENCOUNTER — OFFICE VISIT (OUTPATIENT)
Facility: CLINIC | Age: 54
End: 2024-02-02
Payer: COMMERCIAL

## 2024-02-02 VITALS
SYSTOLIC BLOOD PRESSURE: 128 MMHG | RESPIRATION RATE: 16 BRPM | DIASTOLIC BLOOD PRESSURE: 72 MMHG | BODY MASS INDEX: 35.07 KG/M2 | HEIGHT: 70 IN | WEIGHT: 245 LBS | HEART RATE: 95 BPM | OXYGEN SATURATION: 95 %

## 2024-02-02 DIAGNOSIS — E55.9 VITAMIN D DEFICIENCY: ICD-10-CM

## 2024-02-02 DIAGNOSIS — E03.9 HYPOTHYROIDISM, UNSPECIFIED TYPE: ICD-10-CM

## 2024-02-02 DIAGNOSIS — E83.52 HYPERCALCEMIA: ICD-10-CM

## 2024-02-02 DIAGNOSIS — E21.0 PRIMARY HYPERPARATHYROIDISM (HCC): ICD-10-CM

## 2024-02-02 DIAGNOSIS — R79.89 LOW TESTOSTERONE IN MALE: Primary | ICD-10-CM

## 2024-02-02 PROCEDURE — 99215 OFFICE O/P EST HI 40 MIN: CPT | Performed by: STUDENT IN AN ORGANIZED HEALTH CARE EDUCATION/TRAINING PROGRAM

## 2024-02-02 RX ORDER — TESTOSTERONE CYPIONATE 200 MG/ML
100 INJECTION, SOLUTION INTRAMUSCULAR
Qty: 26 ML | Refills: 1 | Status: SHIPPED | OUTPATIENT
Start: 2024-02-02

## 2024-02-02 NOTE — TELEPHONE ENCOUNTER
Note From dr. Oneil: Pt wanted us to double-check with pharmacy that they were going to dispense the full 26 vials x 1mL each. He said last time he only got 4 vials and was upset.     RN phoned pharmacy to inquire about about many vials patient can .    Spoke with Pharmacist Abbie who states that patient picked up 4 vials on 129. Right now this is scheduled to be filled for 12 vials on 2/23, but they won't know if it will go through for 12 until that day. Insurance may decide they will only fill 1 month at a time.    MCM sent to patient.

## 2024-02-02 NOTE — PROGRESS NOTES
Return Office Visit      Today's date:  2/2/2024      HISTORY OF PRESENT ILLNESS:  Hany Fontanez is a 53 year old male who presents for follow up for hypothyroidism, hypogonadism and primary hyperparathyroidism.    Initial consult April 2022:  HyperCa hx:  Patient states that he has been aware of mildly elevated calcium for the past few years, with recent elevated PTH brought to his attention.  Denies any thiazides, lithium, excess dairy dairy.  Is physically active with overall good energy.   Denies any history of fractures or stones.  Denies any familial hypercalcemia.  Is interested in surgery if it can definitely reverse the hyperPTH.     Reviewed labs:  .9  Ca 10.7-11.1 (2022)  Vit D n/a  Cr wnl  Urine Ca n/a  Hx of fx: no  Hx of stones: possibly had a small stone 'years ago'     Hypogonadism hx:  Started in [place] >10 years ago, seen a 'hormone doctor'. Previously very physically active and felt sx of hypogonadism including weight gain and fatigue. Started on gels, then switched to injections.  At one point his testosterone was >1000, which he was cautioned about. Knows all the risks associated with testosterone replacement. Gets phlebotomized frequently for polycythemia.      Currently takes IM testosterone 100mg twice a week   Also getting anastrozole 0.25mg twice a week (from his [place] doctor). Understands that I will not be prescribing it.  Most recent AM fasting testosterone 927.9, Hbg 17.5, hematocrit 51.1     Hypothyroid hx:  Been on [place] 1 grain daily, for >10 years now.   Feels better on this than levothyroxine.  Most recent TFTs wnl.    Interim hx:  April 2022 - urine Ca elevated at 558  May 2022 - DXA showed normal BMD  June 2022 - Sestamibi did not identify any parathyroid adenoma.; saw ENT  June 2022 - thyroid US showing no nodules (ordered by ENT Dr Eng); plan is to repeat PTH again  July labs show mildly low fT4. Vit D at goal. H&H elevated, pt gets phlebotomy. Testosterone  level still in process. Getting IM Testosterone 100mg twice a week.  Getting phlebotomized every couple of months at a lab in [place].  July 2022 - [place] at 3/4 grain, fT4 0.7, TSH 3.36.     Jan 2023 visit  8/2022 - repeat .9, Ca 11.1; pt will be following up with another ENT  1/2023 - total T 838, TSH 2.55, TT3 120, fT4 0.7, H&H 18.3/54.7; he states it used to be higher but he would do blood donations, hasn't done one recently, doesn't want to decrease dose of T  Currently on [place] 75mg daily; takes on its own in the morning without other medications  Currently IM Test 100mg twice a week  Just had R shoulder surgery, in sling    July 2023 visit  Feels well on current TRT; to prevent polycythemia, he is donating blood q6-8 weeks. States he'll need a doctor's note if he hypothetically  needs to donate more freq (eg q4 weeks)  Started on [place] with medical weight management clinic; lost some weight then plateaued  Feels well on current [place] dose as well  Still traveling a lot for work; stays physically active    Feb 2024 11/2023 - PTH 69, vit D 18, Ca 12.0 -- vit D repleted; recommended to discuss with ENT again given Ca rise  12/2023 - fT4 0.7, TT3 113, TSH 2.59, PSA 2.0, vit D 36 -- remains on [place] 90mg (new brand from pharmacy, trying to see how it goes before dose increase)  1/2024 -  total Testosterone 686, Estradiol 32, H&H 16.7/50.5   Has been traveling a lot for work, a lot more responsibilities at work  Has seen Dr. Rojas (ENT) office but still not able to localize (4D CT) parathyroid adenoma      CURRENT MEDICATION:    Current Outpatient Medications   Medication Sig Dispense Refill    testosterone cypionate 200 mg/mL Intramuscular Solution Inject 0.5 mL (100 mg total) into the muscle twice a week. 26 mL 1    thyroid (ARMOUR THYROID) 15 MG Oral Tab Take 1 tablet (15 mg total) by mouth daily. 30 tablet 0    EZETIMIBE 10 MG Oral Tab TAKE 1 TABLET BY MOUTH EVERY DAY AT NIGHT 90 tablet 1     methylPREDNISolone (MEDROL) 4 MG Oral Tablet Therapy Pack As directed. 1 each 0    ergocalciferol 1.25 MG (66510 UT) Oral Cap Take 1 capsule (50,000 Units total) by mouth once a week. 12 capsule 0    methylphenidate ER 20 MG Oral Capsule SR 24 Hr Take 1 capsule (20 mg total) by mouth every morning.      methylphenidate 5 MG Oral Tab Take 1-2 tablets (5-10 mg total) by mouth daily as needed. IN THE AFTERNOON      BD SYRINGE SLIP TIP 1 ML Does not apply Misc USE FOR TESTOSTERONE TWICE WEEKLY.      Carboxymeth-Cellulose-CitricAc (PLENITY) Oral Cap Take 3 capsules by mouth 2 (two) times daily before meals. 180 capsule 1    ARMOUR THYROID 90 MG Oral Tab Take 1 tablet (90 mg total) by mouth daily. 90 tablet 1    thyroid (ARMOUR THYROID) 90 MG Oral Tab Take 1 tablet (90 mg total) by mouth daily. 90 tablet 1    Insulin Syringe-Needle U-100 (BD INSULIN SYRINGE) 25G X 1\" 1 ML Does not apply Misc USE FOR TESTOSTERONE TWICE WEEKLY. 100 each 0    Syringe 18G X 1-1/2\" 3 ML Does not apply Misc Draw up testosterone twice a week 100 each 1    thyroid (ARMOUR THYROID) 90 MG Oral Tab Take 1 tablet (90 mg total) by mouth daily. 90 tablet 1    Pancrelipase, Lip-Prot-Amyl, (ZENPEP) 46422-858585 units Oral Cap DR Particles Take 3 capsules by mouth 3 (three) times daily before meals. Sample 4 boxes 5/25 E751 120 capsule 0    Insulin Syringe-Needle U-100 25G X 1\" 1 ML Does not apply Misc To be used once a month for vitamin B12 injections 6 each 0    BD DISP NEEDLE 25G X 1\" Does not apply Misc USE AS DIRECTED TWICE WEEKLY 100 each 0    Cholecalciferol (VITAMIN D3) 25 MCG (1000 UT) Oral Cap Take 2 tablets by mouth daily. 5,000 also has k-2         Endocrine Medications            thyroid (ARMOUR THYROID) 15 MG Oral Tab    ARMOUR THYROID 90 MG Oral Tab    thyroid (ARMOUR THYROID) 90 MG Oral Tab    thyroid (ARMOUR THYROID) 90 MG Oral Tab            ALLERGY:  No Known Allergies      PAST MEDICAL, SOCIAL AND FAMILY HISTORY:  See past medical  history marked as reviewed.  See past surgical history marked as reviewed.  See past family history marked as reviewed.  See past social history marked as reviewed.      REVIEW OF SYSTEMS:   Ten point review of systems has been performed and is otherwise negative and/or non-contributory, except as described above.      PHYSICAL EXAM:   Vitals:    02/02/24 1238   BP: 128/72   BP Location: Left arm   Patient Position: Sitting   Cuff Size: adult   Pulse: 95   Resp: 16   SpO2: 95%   Weight: 245 lb (111.1 kg)   Height: 5' 10\" (1.778 m)       BMI: Body mass index is 35.15 kg/m².     CONSTITUTIONAL:  awake, alert, cooperative, no apparent distress, and appears stated age  PSYCH: normal affect  LUNGS: breathing comfortably  CARDIOVASCULAR:  regular rate       DATA:     US THYROID (CPT=76536)    Result Date: 6/9/2022  CONCLUSION:  Heterogeneous thyroid gland.       Dictated by (CST): Mitch Conway MD on 6/09/2022 at 4:08 PM     Finalized by (CST): Mitch Conway MD on 6/09/2022 at 4:09 PM       PROCEDURE:  NM PARATHYROID SPECT/CT (CPT=78072)       COMPARISON:  None.       INDICATIONS:  E21.0 Primary hyperparathyroidism (HCC)       TECHNIQUE:  The patient was administered 297 uCi I-123 by mouth, and the patient returned to the department 3 hours later. At that time, a total of 25.0 mCi Tc99m sestamibi was injected IV, and dual-photon acquisition was performed. A high resolution   large field-of-view series of images was then obtained of the neck and chest region at 30 minutes and 3 hours post injection of the sestamibi. I-123 pinhole images were computer subtracted from the sestamibi pinhole images on a dedicated nuclear medicine    workstation. Additional dedicated SPECT images were taken with concurrent CT scan for both anatomical localization as well as attenuation correction. Scan was reformatted into multi-planar reconstructions on a dedicated workstation.         FINDINGS:   SPECT/CT Imaging:  No thyroid nodule or mass.        I-123:  Uniform activity within a normal-sized thyroid gland       SESTAMIBI:  Uniform activity within a normal-sized thyroid gland with no mismatched activity to suggest parathyroid adenoma.  Normal distribution within soft tissues, including salivary glands.       3 HOUR WASHOUT:  No evidence for ectopic sestamibi to suggest ectopic parathyroid adenoma.                Normal washout from thyroid gland after 3 hours.         Impression   CONCLUSION:  No evidence of parathyroid adenoma.       Dictated by (CST): Mitch Conway MD on 6/01/2022 at 2:22 PM       Finalized by (CST): Mitch Conway MD on 6/01/2022 at 2:24 PM        PROCEDURE:  XR DEXA BONE DENSITOMETRY (CPT=77080)       COMPARISON:  None.       INDICATIONS:    E21.0 Primary hyperparathyroidism (HCC)       PATIENT STATED HISTORY: (As transcribed by Technologist)  Hyperparathyoidism.            LUMBAR SPINE ANALYSIS RESULTS:       Bone mineral density (BMD) (g/cm2):  1.001     Lumbar T-Score:  -0.8       % young normals:  92       % age matched controls:  95       Change from prior spine examination:  n/a                TOTAL HIP ANALYSIS RESULTS:         Bone mineral density (BMD) (g/cm2):  1.053       Total Hip T-Score:  0.1       % young normals:  102       % age matched controls:  107       Change from prior hip examination:  n/a                FEMORAL NECK ANALYSIS RESULTS:         Bone mineral density (BMD) (g/cm2):  0.868       Femoral neck T-Score:  -0.5       % young normals:  93       % age matched controls:  105       Change from prior hip examination:  n/a       LEFT FOREARM ANALYSIS:   Bone mineral density:  0.892         T-score:  1.4     % young normal:  109   % age match controls:  112            ADDITIONAL FINDINGS:  The Z-scores in left femoral neck, left total hip, lumbar spine and left forearm are 0.3, 0.4, -0.5 and 1.9 respectively              Impression   CONCLUSION:  Bone mineral density is in the normal range.      ASSESSMENT AND  PLAN:    (E21.0) Primary hyperparathyroidism (HCC)  (primary encounter diagnosis)  Plan:   - elevated PTH, elevated Ca, elevated 24hr urine Ca c/w primary hyperPTH  - sestamibi and thyroid US in May 2022 shows no adenoma  - 4D CT neck in 11/2023 also non-localizing  - DXA 2022 with normal BMD  - pt has seen 2 ENTs at [place] to discuss  - continue medical monitoring  - if Ca continues to climb and not surgery is planned, can trial sensipar  - labs q6 months    (R79.89) Low testosterone in male  Plan:   - cont IM test 100mg twice a week   - Testosterone and CBC at goal; pt would like to incr Testosterone from 600s to 800s, it was previously >1000; we've extensively discussed risks and benefits  - pt asking for 3 month refill (26 vials)    (E03.9) Hypothyroidism, unspecified type  Plan: clinically and biochemically euthyroid. He has tried LT4 before but feels consistently better on [place]. He's aware of the diff in ratio of T4:T3 in [place] compared to that of a human thyroid. He is open to trying LT4 again if next labs show T4 still deficient. Right now he's on a different brand of natural thyroid and wants to see if he feels better on the same dose  - continue [place] 90mg daily  - TFTs q6 months      Return to Clinic in 6 months        2/2/2024  Justo Oneil MD

## 2024-02-02 NOTE — PATIENT INSTRUCTIONS
After tanking up your vitamin D, you will need to maintain it with OTC vit D3 2000-3000IU daily    Follow up Testosterone, thyroid, PTH/Ca labs prior to next appointment     We will work on a testosterone 3- month refill

## 2024-02-08 ENCOUNTER — TELEPHONE (OUTPATIENT)
Dept: INTERNAL MEDICINE CLINIC | Facility: CLINIC | Age: 54
End: 2024-02-08

## 2024-02-08 NOTE — TELEPHONE ENCOUNTER
Pt paged CS regarding high bp.     Called and talked to pt. He states he just gotten home from [place] (pt drove from [place]). He was taking luggage out of car and carrying it upstairs. He had slight headache/neck pain. He states he has had neck pain occasionally. He checked bp and it was 166/105, heart rate 96.    He currently denies any pain, headache, chest pain/tightness, shortness of breath, dizziness. He states his bp is starting to trend downward. He  rechecked his BP while on the phone and it was at 156/104, heart rate 83.     Routing to CS for recommendations

## 2024-02-09 NOTE — TELEPHONE ENCOUNTER
Scheduled pt for BP follow-up. He asked if what was discussed with CS can be sent to him via my chart.

## 2024-02-09 NOTE — TELEPHONE ENCOUNTER
Called and spoke with pt. BP seems to be improving since he first called 150/. He states his BP fluctuates and it is not unusual for it to increase like this but then typically comes back down. BP last week at [facility] was normal and readings were normal in our office in 12/2023. Currently denies HA, vision changes, CP, and SOB.   Advised to continue home monitoring and call if persistently >140/90. To the ER with any symptoms. Also advised to call in the AM to get a sooner appt with MK for f/u regarding BP - he agrees.

## 2024-02-14 NOTE — TELEPHONE ENCOUNTER
Future Appointments   Date Time Provider Department Center   2/23/2024  7:30 AM Lamin Persaud MD [facility]   5/6/2024  9:00 AM Lamin Persaud MD [facility]   8/2/2024 11:30 AM Geraldine Kaba DO [id number] [place]

## 2024-02-23 ENCOUNTER — OFFICE VISIT (OUTPATIENT)
Dept: INTERNAL MEDICINE CLINIC | Facility: CLINIC | Age: 54
End: 2024-02-23
Payer: COMMERCIAL

## 2024-02-23 VITALS
RESPIRATION RATE: 18 BRPM | HEIGHT: 70.08 IN | WEIGHT: 247.63 LBS | TEMPERATURE: 97 F | DIASTOLIC BLOOD PRESSURE: 74 MMHG | SYSTOLIC BLOOD PRESSURE: 128 MMHG | BODY MASS INDEX: 35.45 KG/M2 | OXYGEN SATURATION: 97 % | HEART RATE: 90 BPM

## 2024-02-23 DIAGNOSIS — Z12.11 SCREENING FOR MALIGNANT NEOPLASM OF COLON: ICD-10-CM

## 2024-02-23 DIAGNOSIS — R82.998 DARK URINE: ICD-10-CM

## 2024-02-23 DIAGNOSIS — R03.0 ELEVATED BLOOD PRESSURE READING: Primary | ICD-10-CM

## 2024-02-23 DIAGNOSIS — Z12.11 SCREENING FOR COLON CANCER: ICD-10-CM

## 2024-02-23 LAB
BILIRUB UR QL STRIP.AUTO: NEGATIVE
CLARITY UR REFRACT.AUTO: CLEAR
COLOR UR AUTO: YELLOW
GLUCOSE UR STRIP.AUTO-MCNC: NORMAL MG/DL
KETONES UR STRIP.AUTO-MCNC: NEGATIVE MG/DL
LEUKOCYTE ESTERASE UR QL STRIP.AUTO: NEGATIVE
NITRITE UR QL STRIP.AUTO: NEGATIVE
PH UR STRIP.AUTO: 5.5 [PH] (ref 5–8)
PROT UR STRIP.AUTO-MCNC: 20 MG/DL
RBC UR QL AUTO: NEGATIVE
SP GR UR STRIP.AUTO: 1.03 (ref 1–1.03)
UROBILINOGEN UR STRIP.AUTO-MCNC: NORMAL MG/DL

## 2024-02-23 PROCEDURE — 99214 OFFICE O/P EST MOD 30 MIN: CPT | Performed by: FAMILY MEDICINE

## 2024-02-23 PROCEDURE — 81001 URINALYSIS AUTO W/SCOPE: CPT | Performed by: FAMILY MEDICINE

## 2024-02-23 NOTE — PROGRESS NOTES
Hany Fontanez  6/19/1970    Chief Complaint   Patient presents with    Follow - Up     ES rm - 9 f/u for BP       HPI:   Hany Fontanez is a 53 year old male who presents for follow-up in his BP. His readings have improved at home. Feels his elevation was due to stress. He has noted some change in his urine color since the beginning of the week while he was out of town, describes it as an orange color but now just a concentrated yellow. No real dysuria.     Current Outpatient Medications   Medication Sig Dispense Refill    testosterone cypionate 200 mg/mL Intramuscular Solution Inject 0.5 mL (100 mg total) into the muscle twice a week. 26 mL 1    thyroid (ARMOUR THYROID) 15 MG Oral Tab Take 1 tablet (15 mg total) by mouth daily. 30 tablet 0    EZETIMIBE 10 MG Oral Tab TAKE 1 TABLET BY MOUTH EVERY DAY AT NIGHT 90 tablet 1    methylPREDNISolone (MEDROL) 4 MG Oral Tablet Therapy Pack As directed. 1 each 0    methylphenidate ER 20 MG Oral Capsule SR 24 Hr Take 1 capsule (20 mg total) by mouth every morning.      methylphenidate 5 MG Oral Tab Take 1-2 tablets (5-10 mg total) by mouth daily as needed. IN THE AFTERNOON      BD SYRINGE SLIP TIP 1 ML Does not apply Misc USE FOR TESTOSTERONE TWICE WEEKLY.      Carboxymeth-Cellulose-CitricAc (PLENITY) Oral Cap Take 3 capsules by mouth 2 (two) times daily before meals. 180 capsule 1    ARMOUR THYROID 90 MG Oral Tab Take 1 tablet (90 mg total) by mouth daily. 90 tablet 1    thyroid (ARMOUR THYROID) 90 MG Oral Tab Take 1 tablet (90 mg total) by mouth daily. 90 tablet 1    Insulin Syringe-Needle U-100 (BD INSULIN SYRINGE) 25G X 1\" 1 ML Does not apply Misc USE FOR TESTOSTERONE TWICE WEEKLY. 100 each 0    Syringe 18G X 1-1/2\" 3 ML Does not apply Misc Draw up testosterone twice a week 100 each 1    thyroid (ARMOUR THYROID) 90 MG Oral Tab Take 1 tablet (90 mg total) by mouth daily. 90 tablet 1    Pancrelipase, Lip-Prot-Amyl, (ZENPEP) 15972-726289 units Oral Cap DR Particles  Take 3 capsules by mouth 3 (three) times daily before meals. Sample 4 boxes 5/25 E751 120 capsule 0    Insulin Syringe-Needle U-100 25G X 1\" 1 ML Does not apply Misc To be used once a month for vitamin B12 injections 6 each 0    BD DISP NEEDLE 25G X 1\" Does not apply Misc USE AS DIRECTED TWICE WEEKLY 100 each 0    Cholecalciferol (VITAMIN D3) 25 MCG (1000 UT) Oral Cap Take 2 tablets by mouth daily. 5,000 also has k-2        No Known Allergies   Past Medical History:   Diagnosis Date    Attention deficit hyperactivity disorder (ADHD)     COVID-19 11/12/2022    fatigue    Disorder of thyroid     High cholesterol     Hyperlipidemia     Low testosterone     Thyroid disease     Visual impairment     glasses      Patient Active Problem List   Diagnosis    Hypercholesteremia    Hypothyroidism    Attention deficit hyperactivity disorder (ADHD), predominantly inattentive type    Low testosterone in male    Primary hyperparathyroidism (HCC)    Anxiety and depression    Coronary artery calcification    Statin intolerance      Past Surgical History:   Procedure Laterality Date    OTHER SURGICAL HISTORY      ROTATOR CUFF REPAIR Right 01/2023      Family History   Problem Relation Age of Onset    Heart Disorder Father         Cardiac bypass    No Known Problems Mother       Social History     Socioeconomic History    Marital status:    Tobacco Use    Smoking status: Never    Smokeless tobacco: Never   Vaping Use    Vaping Use: Never used   Substance and Sexual Activity    Alcohol use: Yes     Alcohol/week: 2.0 standard drinks of alcohol     Types: 2 Standard drinks or equivalent per week    Drug use: Never   Other Topics Concern    Caffeine Concern Yes     Comment: coffee- 3 cups a day,Sugar free Redbull - couple a week, Black tea Once a day         REVIEW OF SYSTEMS:   GENERAL: feels well otherwise, no fever, chills, no flank pain    EXAM:   /74 (BP Location: Right arm, Patient Position: Sitting, Cuff Size: large)    Pulse 90   Temp 96.6 °F (35.9 °C) (Temporal)   Resp 18   Ht 5' 10.08\" (1.78 m)   Wt 247 lb 9.6 oz (112.3 kg)   SpO2 97%   BMI 35.45 kg/m²   GENERAL: Well developed, well nourished,in no apparent distress  SKIN: No rash  EYES: PERRLA, EOMI, conjunctiva are clear  HEENT: atraumatic, normocephalic  NECK: supple  LUNGS: clear to auscultation  CARDIO: RRR without murmur  GI: soft, NT    ASSESSMENT AND PLAN:   Hany Fontanez is a 53 year old male who presents for follow-up    Elevated blood pressure reading  Overall improving on home and in office measures. Will continue to monitor at home and follow-up in at CPE in May.     Dark urine  Check UA. No sig dysuria or flank pain. Continue emphasis on hydration.   - UA/M With Culture Reflex [E]; Future    Screening for malignant neoplasm of colon  FIT given  - Occult Blood, Fecal, Immunoassay (Blue cards) [E]; Future      All questions were answered and the patient agrees with the plan.     Thank you,  Lamin Persaud MD

## 2024-03-06 RX ORDER — CYANOCOBALAMIN 1000 UG/ML
1000 INJECTION, SOLUTION INTRAMUSCULAR; SUBCUTANEOUS
Qty: 3 ML | Refills: 1 | OUTPATIENT
Start: 2024-03-06 | End: 2024-08-04

## 2024-05-01 ENCOUNTER — LAB ENCOUNTER (OUTPATIENT)
Dept: LAB | Age: 54
End: 2024-05-01
Attending: FAMILY MEDICINE
Payer: COMMERCIAL

## 2024-05-01 DIAGNOSIS — Z12.11 SCREENING FOR MALIGNANT NEOPLASM OF COLON: ICD-10-CM

## 2024-05-01 LAB — HEMOCCULT STL QL: NEGATIVE

## 2024-05-01 PROCEDURE — 82274 ASSAY TEST FOR BLOOD FECAL: CPT

## 2024-05-03 ENCOUNTER — TELEPHONE (OUTPATIENT)
Facility: CLINIC | Age: 54
End: 2024-05-03

## 2024-05-03 ENCOUNTER — LAB ENCOUNTER (OUTPATIENT)
Dept: LAB | Facility: HOSPITAL | Age: 54
End: 2024-05-03
Attending: STUDENT IN AN ORGANIZED HEALTH CARE EDUCATION/TRAINING PROGRAM
Payer: COMMERCIAL

## 2024-05-03 DIAGNOSIS — R79.89 LOW TESTOSTERONE IN MALE: ICD-10-CM

## 2024-05-03 DIAGNOSIS — E03.9 HYPOTHYROIDISM, UNSPECIFIED TYPE: ICD-10-CM

## 2024-05-03 DIAGNOSIS — Z79.890 LONG-TERM CURRENT USE OF TESTOSTERONE REPLACEMENT THERAPY: ICD-10-CM

## 2024-05-03 DIAGNOSIS — E83.52 HYPERCALCEMIA: ICD-10-CM

## 2024-05-03 DIAGNOSIS — E21.0 PRIMARY HYPERPARATHYROIDISM (HCC): ICD-10-CM

## 2024-05-03 DIAGNOSIS — E03.9 HYPOTHYROIDISM, UNSPECIFIED TYPE: Primary | ICD-10-CM

## 2024-05-03 LAB
ANION GAP SERPL CALC-SCNC: 3 MMOL/L (ref 0–18)
BUN BLD-MCNC: 17 MG/DL (ref 9–23)
CALCIUM BLD-MCNC: 11 MG/DL (ref 8.5–10.1)
CHLORIDE SERPL-SCNC: 106 MMOL/L (ref 98–112)
CO2 SERPL-SCNC: 32 MMOL/L (ref 21–32)
COMPLEXED PSA SERPL-MCNC: 2 NG/ML (ref ?–4)
CREAT BLD-MCNC: 1.34 MG/DL
EGFRCR SERPLBLD CKD-EPI 2021: 64 ML/MIN/1.73M2 (ref 60–?)
ERYTHROCYTE [DISTWIDTH] IN BLOOD BY AUTOMATED COUNT: 13.6 %
FASTING STATUS PATIENT QL REPORTED: YES
GLUCOSE BLD-MCNC: 101 MG/DL (ref 70–99)
HCT VFR BLD AUTO: 49.2 %
HGB BLD-MCNC: 16.4 G/DL
MCH RBC QN AUTO: 29.4 PG (ref 26–34)
MCHC RBC AUTO-ENTMCNC: 33.3 G/DL (ref 31–37)
MCV RBC AUTO: 88.3 FL
OSMOLALITY SERPL CALC.SUM OF ELEC: 294 MOSM/KG (ref 275–295)
PLATELET # BLD AUTO: 282 10(3)UL (ref 150–450)
POTASSIUM SERPL-SCNC: 4.8 MMOL/L (ref 3.5–5.1)
PTH-INTACT SERPL-MCNC: 147.8 PG/ML (ref 18.5–88)
RBC # BLD AUTO: 5.57 X10(6)UL
SODIUM SERPL-SCNC: 141 MMOL/L (ref 136–145)
T3 SERPL-MCNC: 109 NG/DL (ref 60–181)
T4 FREE SERPL-MCNC: 0.8 NG/DL (ref 0.8–1.7)
TESTOST SERPL-MCNC: 1133.09 NG/DL
TSI SER-ACNC: 0.13 MIU/ML (ref 0.36–3.74)
VIT D+METAB SERPL-MCNC: 48.9 NG/ML (ref 30–100)
WBC # BLD AUTO: 6.6 X10(3) UL (ref 4–11)

## 2024-05-03 PROCEDURE — 82306 VITAMIN D 25 HYDROXY: CPT

## 2024-05-03 PROCEDURE — 36415 COLL VENOUS BLD VENIPUNCTURE: CPT

## 2024-05-03 PROCEDURE — 84403 ASSAY OF TOTAL TESTOSTERONE: CPT

## 2024-05-03 PROCEDURE — 85027 COMPLETE CBC AUTOMATED: CPT

## 2024-05-03 PROCEDURE — 83970 ASSAY OF PARATHORMONE: CPT

## 2024-05-03 PROCEDURE — 84480 ASSAY TRIIODOTHYRONINE (T3): CPT

## 2024-05-03 PROCEDURE — 84439 ASSAY OF FREE THYROXINE: CPT

## 2024-05-03 PROCEDURE — 80048 BASIC METABOLIC PNL TOTAL CA: CPT

## 2024-05-03 PROCEDURE — 84443 ASSAY THYROID STIM HORMONE: CPT

## 2024-05-03 NOTE — TELEPHONE ENCOUNTER
Would ideally like labs in six weeks but can wait until about two weeks before his follow up with me, again mid-cycle to whatever degree possible. He can let us know if any questions or updates before then. Thanks!

## 2024-05-03 NOTE — TELEPHONE ENCOUNTER
RN phoned pt. Pt made aware of Dr. Kaba's orders/response:    \"Testosterone level is very high. Were labs performed mid-cycle? If so, recommend reducing his dose as these levels are dangerously elevated and raise risk for stroke or blood clot.     PTH is again elevated; from what I see his last communication with ENT was recommendation for repeat imaging if his PTH level went up again. I recommend he contact his ENT for further discussion.     Vitamin D levels are normal.     TSH is suppressed so his current dose of [place] is too strong; I recommend he either skip it one day per week or go down to the next dose.\"      Pt on 100 mg testosterone TWO times per week. Injects himself every Monday and Thursday. Last dose was yesterday early AM.    Pt reported \"being down\" 15 lbs. Per pt, has been treated by reputable MD down in [place] in the past who wanted pt's testosterone level to be around 1300. Local endo (prior to pt seeing Dr. Oneil) wanted pt at 1100 level and Dr. Oneil and pt agreed on a testosterone goal around 1000. 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    Pt is on [place] 90 mg daily and has been on that dose \"forever\". Dr. Oneil also prescribed [place] 15 mg to take prn to be at goal (TFT-wise).  Pt states he takes the additional 15 mg 1-2 x a week occasionally. Pt reluctant to go to the next dose down of [place] (60 mg).    Pt is asking why calcium and PTH are elevated?    Message routed to Dr. Kaba.

## 2024-05-03 NOTE — TELEPHONE ENCOUNTER
Per her most recent notes, Dr. Oneil had recommended target testosterone of 600-800. I do not recommend testosterone levels above 1000 due to high risk of complications. I would consider 100mg and 75mg, if he wants to stick on twice-weekly injections.     Weight loss may be due to thyroid levels, again, being too high. The TSH (brain hormone) being low suggests that his brain is trying to lower the amount of thyroid hormone in the blood. I would recommend that he at least stop taking the extra 15mg during the week. \"Plan: clinically and biochemically euthyroid. He has tried LT4 before but feels consistently better on [place]. He's aware of the diff in ratio of T4:T3 in [place] compared to that of a human thyroid. He is open to trying LT4 again if next labs show T4 still deficient. Right now he's on a different brand of natural thyroid and wants to see if he feels better on the same dose\" - from Dr. Oneil's last note in February.     He has a previous history of hyperparathyroidism (overactive parathyroid gland causing high calcium). He has previously seen ENT (Dr. Rojas). Dr. Rojas's last communication to the patient in Nov 2023 was to repeat imaging if the calcium and PTH went up again.     Pt is due to see me in August but sounds like he prefers a lot of off-label therapy and treatment goals; though I am happy to see him as scheduled he is also free to seek a different opinion or consider naturopathic medicine as I am more strict to guidelines and will be pushing him to target different levels than he appears comfortable. Many patients see \"standard\" endocrinology for things like calcium and PTH and then follow separately with naturopathic/functional med for hormone therapy that is in line with their preferences. Thanks!

## 2024-05-03 NOTE — PROGRESS NOTES
Testosterone level is very high. Were labs performed mid-cycle? If so, recommend reducing his dose as these levels are dangerously elevated and raise risk for stroke or blood clot.     PTH is again elevated; from what I see his last communication with ENT was recommendation for repeat imaging if his PTH level went up again. I recommend he contact his ENT for further discussion.    Vitamin D levels are normal.     TSH is suppressed so his current dose of [place] is too strong; I recommend he either skip it one day per week or go down to the next dose.

## 2024-05-03 NOTE — TELEPHONE ENCOUNTER
Patient phoned back to discuss below- patient agreed to plan for Testosterone will decrease to 100 mg and 75 mg for twice weekly injections.    States he is willing to take [place] 90 mg daily and cut out 15 mg, was only taking this a few times a week.    Patient may reach out to ENT he previously saw as they had recommended repeat Imaging if PTH still elevated, may reach out to Dr. Mayfield for third opinion.    Patient said recent labs cost more now with new insurance, wanting to know when he will need repeat labs done

## 2024-05-05 ENCOUNTER — PATIENT MESSAGE (OUTPATIENT)
Dept: OTOLARYNGOLOGY | Facility: CLINIC | Age: 54
End: 2024-05-05

## 2024-05-06 ENCOUNTER — OFFICE VISIT (OUTPATIENT)
Dept: INTERNAL MEDICINE CLINIC | Facility: CLINIC | Age: 54
End: 2024-05-06
Payer: COMMERCIAL

## 2024-05-06 VITALS
RESPIRATION RATE: 18 BRPM | BODY MASS INDEX: 34.16 KG/M2 | OXYGEN SATURATION: 97 % | TEMPERATURE: 98 F | WEIGHT: 238.63 LBS | HEART RATE: 92 BPM | DIASTOLIC BLOOD PRESSURE: 82 MMHG | HEIGHT: 70.08 IN | SYSTOLIC BLOOD PRESSURE: 132 MMHG

## 2024-05-06 DIAGNOSIS — F90.0 ATTENTION DEFICIT HYPERACTIVITY DISORDER (ADHD), PREDOMINANTLY INATTENTIVE TYPE: ICD-10-CM

## 2024-05-06 DIAGNOSIS — Z00.00 WELLNESS EXAMINATION: Primary | ICD-10-CM

## 2024-05-06 DIAGNOSIS — I25.10 CORONARY ARTERY CALCIFICATION: ICD-10-CM

## 2024-05-06 DIAGNOSIS — F41.9 ANXIETY AND DEPRESSION: ICD-10-CM

## 2024-05-06 DIAGNOSIS — E21.0 PRIMARY HYPERPARATHYROIDISM (HCC): ICD-10-CM

## 2024-05-06 DIAGNOSIS — F32.A ANXIETY AND DEPRESSION: ICD-10-CM

## 2024-05-06 DIAGNOSIS — E03.9 HYPOTHYROIDISM, UNSPECIFIED TYPE: ICD-10-CM

## 2024-05-06 DIAGNOSIS — I25.84 CORONARY ARTERY CALCIFICATION: ICD-10-CM

## 2024-05-06 DIAGNOSIS — Z78.9 STATIN INTOLERANCE: ICD-10-CM

## 2024-05-06 DIAGNOSIS — E78.00 HYPERCHOLESTEREMIA: ICD-10-CM

## 2024-05-06 DIAGNOSIS — R79.89 LOW TESTOSTERONE IN MALE: ICD-10-CM

## 2024-05-06 DIAGNOSIS — E53.8 LOW SERUM VITAMIN B12: ICD-10-CM

## 2024-05-06 PROCEDURE — 90471 IMMUNIZATION ADMIN: CPT | Performed by: FAMILY MEDICINE

## 2024-05-06 PROCEDURE — 90750 HZV VACC RECOMBINANT IM: CPT | Performed by: FAMILY MEDICINE

## 2024-05-06 PROCEDURE — 99396 PREV VISIT EST AGE 40-64: CPT | Performed by: FAMILY MEDICINE

## 2024-05-06 NOTE — PROGRESS NOTES
Hany Fontanez  6/19/1970    Chief Complaint   Patient presents with    Well Adult     EJ RM 8- Pt is here for yearly physical       HPI:   Hany Fontanez is a 53 year old male who presents for CPE. Overall doing well with exercise and diet. He is now following with Dr. Kaba during Dr. Oneil's leave. BP has been controlled at home. Initial goal weight is 220. No new concerns today.     Current Outpatient Medications   Medication Sig Dispense Refill    testosterone cypionate 200 mg/mL Intramuscular Solution Inject 0.5 mL (100 mg total) into the muscle twice a week. 26 mL 1    thyroid (ARMOUR THYROID) 15 MG Oral Tab Take 1 tablet (15 mg total) by mouth daily. 30 tablet 0    EZETIMIBE 10 MG Oral Tab TAKE 1 TABLET BY MOUTH EVERY DAY AT NIGHT 90 tablet 1    methylPREDNISolone (MEDROL) 4 MG Oral Tablet Therapy Pack As directed. 1 each 0    methylphenidate ER 20 MG Oral Capsule SR 24 Hr Take 1 capsule (20 mg total) by mouth every morning.      methylphenidate 5 MG Oral Tab Take 1-2 tablets (5-10 mg total) by mouth daily as needed. IN THE AFTERNOON      BD SYRINGE SLIP TIP 1 ML Does not apply Misc USE FOR TESTOSTERONE TWICE WEEKLY.      Carboxymeth-Cellulose-CitricAc (PLENITY) Oral Cap Take 3 capsules by mouth 2 (two) times daily before meals. 180 capsule 1    ARMOUR THYROID 90 MG Oral Tab Take 1 tablet (90 mg total) by mouth daily. 90 tablet 1    thyroid (ARMOUR THYROID) 90 MG Oral Tab Take 1 tablet (90 mg total) by mouth daily. 90 tablet 1    Insulin Syringe-Needle U-100 (BD INSULIN SYRINGE) 25G X 1\" 1 ML Does not apply Misc USE FOR TESTOSTERONE TWICE WEEKLY. 100 each 0    Syringe 18G X 1-1/2\" 3 ML Does not apply Misc Draw up testosterone twice a week 100 each 1    thyroid (ARMOUR THYROID) 90 MG Oral Tab Take 1 tablet (90 mg total) by mouth daily. 90 tablet 1    Pancrelipase, Lip-Prot-Amyl, (ZENPEP) 05876-927436 units Oral Cap DR Particles Take 3 capsules by mouth 3 (three) times daily before meals. Sample 4  boxes 5/25 E751 120 capsule 0    Insulin Syringe-Needle U-100 25G X 1\" 1 ML Does not apply Misc To be used once a month for vitamin B12 injections 6 each 0    BD DISP NEEDLE 25G X 1\" Does not apply Misc USE AS DIRECTED TWICE WEEKLY 100 each 0    Cholecalciferol (VITAMIN D3) 25 MCG (1000 UT) Oral Cap Take 2 tablets by mouth daily. 5,000 also has k-2        No Known Allergies   Past Medical History:    Attention deficit hyperactivity disorder (ADHD)    COVID-19    fatigue    Disorder of thyroid    High cholesterol    Hyperlipidemia    Low testosterone    Thyroid disease    Visual impairment    glasses      Patient Active Problem List   Diagnosis    Hypercholesteremia    Hypothyroidism    Attention deficit hyperactivity disorder (ADHD), predominantly inattentive type    Low testosterone in male    Primary hyperparathyroidism (HCC)    Anxiety and depression    Coronary artery calcification    Statin intolerance      Past Surgical History:   Procedure Laterality Date    Other surgical history      Rotator cuff repair Right 01/2023      Family History   Problem Relation Age of Onset    Heart Disorder Father         Cardiac bypass    No Known Problems Mother       Social History     Socioeconomic History    Marital status:    Tobacco Use    Smoking status: Never    Smokeless tobacco: Never   Vaping Use    Vaping status: Never Used   Substance and Sexual Activity    Alcohol use: Yes     Alcohol/week: 2.0 standard drinks of alcohol     Types: 2 Standard drinks or equivalent per week    Drug use: Never   Other Topics Concern    Caffeine Concern Yes     Comment: coffee- 3 cups a day,Sugar free Redbull - couple a week, Black tea Once a day         REVIEW OF SYSTEMS:   GENERAL: no recent illness  SKIN: no new rashes  EYES: no new vision changes  HEENT: not congested  LUNGS: no dyspnea  CARDIOVASCULAR: no new CP    EXAM:   /82   Pulse 92   Temp 97.6 °F (36.4 °C) (Temporal)   Resp 18   Ht 5' 10.08\" (1.78 m)   Wt  238 lb 9.6 oz (108.2 kg)   SpO2 97%   BMI 34.16 kg/m²   GENERAL: Well developed, well nourished,in no apparent distress  SKIN: No rash  EYES: PERRLA, EOMI, conjunctiva are clear  HEENT: atraumatic, normocephalic,ears and throat are clear  NECK: supple,no adenopathy,no bruits  LUNGS: clear to auscultation  CARDIO: RRR without murmur  GI: good BS's,no masses, HSM or tenderness    ASSESSMENT AND PLAN:   Hany Fontanez is a 53 year old male who presents for CPE    Wellness examination  Discussed age appropriate health and wellness.  - Zoster Recombinant Adjuvanted (Shingrix -Shingles) [42785]    Hypothyroidism, unspecified type  On [place]. Management per endo.     Primary hyperparathyroidism (HCC)  Planning to follow-up with ENT    Coronary artery calcification  Hypercholesteremia  Statin intolerance  Tolerating Ezetimibe. Will check lipid panel prior to follow-up in 6 months.  - Lipid Panel; Future    Low testosterone in male  On TRT, management per endo.     Attention deficit hyperactivity disorder (ADHD), predominantly inattentive type  Anxiety and depression  Overall stable.     Low serum vitamin B12  Will check level with next lab draw  - Vitamin B12 [E]; Future      Return in 6 months (on 11/6/2024).  There are no Patient Instructions on file for this visit.    All questions were answered and the patient agrees with the plan.     Thank you,  Lamin Persaud MD

## 2024-05-06 NOTE — TELEPHONE ENCOUNTER
From: Hany Fontanez  To: Lamin Rojas  Sent: 5/5/2024 11:39 AM CDT  Subject: Parathyroid    I was in to see you at the end of last year and I wanted to keep you up to date and get your thoughts on my latest lab results. Can you review labs and let me know if any follow up is needed.

## 2024-05-07 ENCOUNTER — TELEPHONE (OUTPATIENT)
Facility: CLINIC | Age: 54
End: 2024-05-07

## 2024-05-07 DIAGNOSIS — R79.89 LOW TESTOSTERONE IN MALE: ICD-10-CM

## 2024-05-07 NOTE — TELEPHONE ENCOUNTER
LOV: 2/2/24 Dr. Clarice MAR: scheduled 8/2/24 with Dr. Kaba     Last Refill: 4/6/24     Pt was told to have labs done 2 weeks prior to August follow up.

## 2024-05-07 NOTE — TELEPHONE ENCOUNTER
Contacted patient and went over that last time NM parathyroid with spect was done was 6/01/2022, ordered by Dr. Oneil, had US thyroid 6/09/2022 and ct soft tissue 4D 11/26/2023. No further questions, will schedule NM test soon.

## 2024-05-07 NOTE — TELEPHONE ENCOUNTER
5/7/24-Received via fax from [facility] a Request for a refill or new rx for BD Precisionglide Needle 25G.  Placed in PA in basket.

## 2024-05-10 RX ORDER — NEEDLES, DISPOSABLE 25GX5/8"
NEEDLE, DISPOSABLE MISCELLANEOUS
Qty: 100 EACH | Refills: 0 | Status: SHIPPED | OUTPATIENT
Start: 2024-05-10

## 2024-07-01 ENCOUNTER — HOSPITAL ENCOUNTER (EMERGENCY)
Facility: HOSPITAL | Age: 54
Discharge: HOME | End: 2024-07-02
Attending: EMERGENCY MEDICINE
Payer: COMMERCIAL

## 2024-07-01 DIAGNOSIS — R03.0 ELEVATED BLOOD PRESSURE READING: Primary | ICD-10-CM

## 2024-07-01 LAB
ALBUMIN SERPL-MCNC: 3.6 G/DL (ref 3.4–5)
ALBUMIN/GLOB SERPL: 1 {RATIO} (ref 1–2)
ALP LIVER SERPL-CCNC: 81 U/L
ALT SERPL-CCNC: 46 U/L
ANION GAP SERPL CALC-SCNC: 6 MMOL/L (ref 0–18)
AST SERPL-CCNC: 32 U/L (ref 15–37)
BASOPHILS # BLD AUTO: 0.06 X10(3) UL (ref 0–0.2)
BASOPHILS NFR BLD AUTO: 0.7 %
BILIRUB SERPL-MCNC: 0.4 MG/DL (ref 0.1–2)
BUN BLD-MCNC: 14 MG/DL (ref 9–23)
CALCIUM BLD-MCNC: 11 MG/DL (ref 8.5–10.1)
CHLORIDE SERPL-SCNC: 109 MMOL/L (ref 98–112)
CO2 SERPL-SCNC: 24 MMOL/L (ref 21–32)
CREAT BLD-MCNC: 1.15 MG/DL
EGFRCR SERPLBLD CKD-EPI 2021: 77 ML/MIN/1.73M2 (ref 60–?)
EOSINOPHIL # BLD AUTO: 0.22 X10(3) UL (ref 0–0.7)
EOSINOPHIL NFR BLD AUTO: 2.7 %
ERYTHROCYTE [DISTWIDTH] IN BLOOD BY AUTOMATED COUNT: 13.8 %
GLOBULIN PLAS-MCNC: 3.5 G/DL (ref 2.8–4.4)
GLUCOSE BLD-MCNC: 102 MG/DL (ref 70–99)
HCT VFR BLD AUTO: 49 %
HGB BLD-MCNC: 17 G/DL
IMM GRANULOCYTES # BLD AUTO: 0.08 X10(3) UL (ref 0–1)
IMM GRANULOCYTES NFR BLD: 1 %
LYMPHOCYTES # BLD AUTO: 2.18 X10(3) UL (ref 1–4)
LYMPHOCYTES NFR BLD AUTO: 27 %
MCH RBC QN AUTO: 29.8 PG (ref 26–34)
MCHC RBC AUTO-ENTMCNC: 34.7 G/DL (ref 31–37)
MCV RBC AUTO: 85.8 FL
MONOCYTES # BLD AUTO: 0.54 X10(3) UL (ref 0.1–1)
MONOCYTES NFR BLD AUTO: 6.7 %
NEUTROPHILS # BLD AUTO: 4.98 X10 (3) UL (ref 1.5–7.7)
NEUTROPHILS # BLD AUTO: 4.98 X10(3) UL (ref 1.5–7.7)
NEUTROPHILS NFR BLD AUTO: 61.9 %
OSMOLALITY SERPL CALC.SUM OF ELEC: 289 MOSM/KG (ref 275–295)
PLATELET # BLD AUTO: 242 10(3)UL (ref 150–450)
POTASSIUM SERPL-SCNC: 4.1 MMOL/L (ref 3.5–5.1)
PROT SERPL-MCNC: 7.1 G/DL (ref 6.4–8.2)
RBC # BLD AUTO: 5.71 X10(6)UL
SODIUM SERPL-SCNC: 139 MMOL/L (ref 136–145)
TROPONIN I SERPL HS-MCNC: 8 NG/L
WBC # BLD AUTO: 8.1 X10(3) UL (ref 4–11)

## 2024-07-01 PROCEDURE — 99284 EMERGENCY DEPT VISIT MOD MDM: CPT

## 2024-07-01 PROCEDURE — 93010 ELECTROCARDIOGRAM REPORT: CPT

## 2024-07-01 PROCEDURE — 36415 COLL VENOUS BLD VENIPUNCTURE: CPT

## 2024-07-01 PROCEDURE — 85025 COMPLETE CBC W/AUTO DIFF WBC: CPT

## 2024-07-01 PROCEDURE — 93005 ELECTROCARDIOGRAM TRACING: CPT

## 2024-07-01 PROCEDURE — 84484 ASSAY OF TROPONIN QUANT: CPT

## 2024-07-01 PROCEDURE — 84484 ASSAY OF TROPONIN QUANT: CPT | Performed by: EMERGENCY MEDICINE

## 2024-07-01 PROCEDURE — 85025 COMPLETE CBC W/AUTO DIFF WBC: CPT | Performed by: EMERGENCY MEDICINE

## 2024-07-01 PROCEDURE — 80053 COMPREHEN METABOLIC PANEL: CPT

## 2024-07-01 PROCEDURE — 80053 COMPREHEN METABOLIC PANEL: CPT | Performed by: EMERGENCY MEDICINE

## 2024-07-01 RX ORDER — CLONIDINE HYDROCHLORIDE 0.1 MG/1
0.1 TABLET ORAL ONCE
Status: COMPLETED | OUTPATIENT
Start: 2024-07-01 | End: 2024-07-01

## 2024-07-02 VITALS
DIASTOLIC BLOOD PRESSURE: 91 MMHG | HEART RATE: 80 BPM | OXYGEN SATURATION: 97 % | RESPIRATION RATE: 18 BRPM | SYSTOLIC BLOOD PRESSURE: 156 MMHG | BODY MASS INDEX: 34 KG/M2 | WEIGHT: 238.13 LBS | TEMPERATURE: 98 F

## 2024-07-02 RX ORDER — LOSARTAN POTASSIUM 25 MG/1
25 TABLET ORAL DAILY
Qty: 30 TABLET | Refills: 0 | Status: SHIPPED | OUTPATIENT
Start: 2024-07-02 | End: 2024-08-01

## 2024-07-02 NOTE — ED PROVIDER NOTES
Patient Seen in: [facility] Emergency Department      History     Chief Complaint   Patient presents with    Hypertension     Stated Complaint: elevated bp at home with headache.  denies bp meds    Subjective:   Patient is a 54-year-old male who presents emergency room for evaluation of hypertension.  Patient is currently not on blood pressure medicine but did have a spike in his blood pressure a few months ago that his doctor has been monitoring it he has been doing home blood pressure checks.  Patient reports mild posterior headache but declined imaging.  Patient plans on following up with his primary doctor.  On exam patient has no focal deficits his GCS is 15.  His blood pressure is elevated manually.  Would recommend starting on blood pressure medicine.  I will give him 1 here to help lower it.  Patient is agreeable to that plan but he does not want any further imaging at this time.    The history is provided by the patient and the spouse.           Objective:   Past Medical History:    Attention deficit hyperactivity disorder (ADHD)    COVID-19    fatigue    Disorder of thyroid    High cholesterol    Hyperlipidemia    Low testosterone    Thyroid disease    Visual impairment    glasses              Past Surgical History:   Procedure Laterality Date    Other surgical history      Rotator cuff repair Right 01/2023                Social History     Socioeconomic History    Marital status:    Tobacco Use    Smoking status: Never    Smokeless tobacco: Never   Vaping Use    Vaping status: Never Used   Substance and Sexual Activity    Alcohol use: Yes     Alcohol/week: 2.0 standard drinks of alcohol     Types: 2 Standard drinks or equivalent per week    Drug use: Never   Other Topics Concern    Caffeine Concern Yes     Comment: coffee- 3 cups a day,Sugar free Redbull - couple a week, Black tea Once a day              Review of Systems   Neurological:  Positive for headaches.       Positive for stated  Chief Complaint: Hypertension    Other systems are as noted in HPI.  Constitutional and vital signs reviewed.      All other systems reviewed and negative except as noted above.    Physical Exam     ED Triage Vitals   BP 07/01/24 2152 (!) 154/103   Pulse 07/01/24 2150 80   Resp 07/01/24 2150 18   Temp 07/01/24 2150 97.8 °F (36.6 °C)   Temp src 07/01/24 2150 Temporal   SpO2 07/01/24 2150 97 %   O2 Device 07/01/24 2150 None (Room air)       Current Vitals:   Vital Signs  BP: (!) 156/91  Pulse: 80  Resp: 18  Temp: 97.8 °F (36.6 °C)  Temp src: Temporal    Oxygen Therapy  SpO2: 97 %  O2 Device: None (Room air)            Physical Exam  Vitals and nursing note reviewed.   Constitutional:       General: He is not in acute distress.     Appearance: Normal appearance. He is normal weight. He is not toxic-appearing.   HENT:      Head: Normocephalic and atraumatic.      Mouth/Throat:      Mouth: Mucous membranes are moist.   Eyes:      Extraocular Movements: Extraocular movements intact.      Pupils: Pupils are equal, round, and reactive to light.   Cardiovascular:      Rate and Rhythm: Normal rate and regular rhythm.      Pulses: Normal pulses.   Pulmonary:      Effort: Pulmonary effort is normal.      Breath sounds: Normal breath sounds.   Abdominal:      General: Bowel sounds are normal. There is no distension.      Palpations: Abdomen is soft.      Tenderness: There is no abdominal tenderness. There is no guarding or rebound.   Musculoskeletal:         General: Normal range of motion.   Skin:     General: Skin is warm.      Capillary Refill: Capillary refill takes less than 2 seconds.   Neurological:      General: No focal deficit present.      Mental Status: He is alert and oriented to person, place, and time.      Cranial Nerves: No cranial nerve deficit.      Motor: No weakness.   Psychiatric:         Mood and Affect: Mood normal.         Behavior: Behavior normal.               ED Course     Labs Reviewed   COMP  METABOLIC PANEL (14) - Abnormal; Notable for the following components:       Result Value    Glucose 102 (*)     Calcium, Total 11.0 (*)     All other components within normal limits   CBC W/ DIFFERENTIAL - Abnormal; Notable for the following components:    RBC 5.71 (*)     All other components within normal limits   TROPONIN I HIGH SENSITIVITY - Normal   CBC WITH DIFFERENTIAL WITH PLATELET    Narrative:     The following orders were created for panel order CBC With Differential With Platelet.  Procedure                               Abnormality         Status                     ---------                               -----------         ------                     CBC W/ DIFFERENTIAL[[id number]]          Abnormal            Final result                 Please view results for these tests on the individual orders.   RAINBOW DRAW GOLD   RAINBOW DRAW BLUE     EKG    Rate, intervals and axes as noted on EKG Report.  Rate: 73  Rhythm: Sinus Rhythm  Reading: Normal sinus rhythm no ST elevation VT interval of 140 QRS of 100 QTc of 376 with axes of 24/28/13                 Patient declined imaging         MDM      Social -negative tobacco, negative etoh, negative drugs  Family History-father with history of coronary disease  Past Medical History-thyroid, high cholesterol, ADHD, low testosterone, hyperlipidemia    Differential diagnosis before testing included hypertension, intracranial hemorrhage, coronary artery disease    Co-morbidities that add to the complexity of management include: Patient is not currently on any blood pressure medications    Testing ordered during this visit included EKG baseline labs, patient declined imaging including a chest x-ray and CT of the brain    Radiographic images  Patient declined imaging    External chart review showed review of care everywhere in epic system shows no related comorbidities to current presentation, on his most recent office visit to his primary doctor and May his blood  pressure at that time was 132 systolic.    History obtained by an independent source included from patient, family    Discussion of management with patient, family    Social determinants of health that affect care include not applicable      Medications Provided: Clonidine, losartan    Course of Events during Emergency Room Visit include 34-year-old male presents emergency room for evaluation of elevated blood pressure readings.  Will check a manual blood pressure which is elevated here.  We given clonidine to help lower it acutely.  Patient be started on losartan for home.  Recommend follow-up with primary care physician.  Labs that were obtained showed no acute process and EKG is unremarkable patient declined imaging.      Patient's blood pressure improved plan for discharge home    Disposition:          Discharge  I have discussed with the patient the results of test, differential diagnosis, treatment plan, warning signs and symptoms which should prompt immediate return.  They expressed understanding of these instructions and agrees to the following plan provided.  They were given written discharge instructions and agrees to return for any concerns and voiced understanding and all questions were answered.                                      Medical Decision Making      Disposition and Plan     Clinical Impression:  1. Elevated blood pressure reading         Disposition:  Discharge  7/2/2024 12:46 am    Follow-up:  Lamin Persaud MD  [facility]  [address] [address] [phone]    Schedule an appointment as soon as possible for a visit            Medications Prescribed:  Current Discharge Medication List        START taking these medications    Details   losartan 25 MG Oral Tab Take 1 tablet (25 mg total) by mouth daily.  Qty: 30 tablet, Refills: 0

## 2024-07-03 DIAGNOSIS — R79.89 LOW TESTOSTERONE IN MALE: ICD-10-CM

## 2024-07-03 LAB
ATRIAL RATE: 73 BPM
P AXIS: 24 DEGREES
P-R INTERVAL: 140 MS
Q-T INTERVAL: 342 MS
QRS DURATION: 100 MS
QTC CALCULATION (BEZET): 376 MS
R AXIS: 28 DEGREES
T AXIS: 13 DEGREES
VENTRICULAR RATE: 73 BPM

## 2024-07-03 NOTE — TELEPHONE ENCOUNTER
Phoned pharmacy and confirmed they have rx for 25g 1\" needle- only need syringe with 18g needle.    Pended for sign off.

## 2024-07-16 ENCOUNTER — LAB ENCOUNTER (OUTPATIENT)
Dept: LAB | Facility: HOSPITAL | Age: 54
End: 2024-07-16
Attending: STUDENT IN AN ORGANIZED HEALTH CARE EDUCATION/TRAINING PROGRAM
Payer: COMMERCIAL

## 2024-07-16 DIAGNOSIS — E21.0 PRIMARY HYPERPARATHYROIDISM (HCC): ICD-10-CM

## 2024-07-16 DIAGNOSIS — E78.00 HYPERCHOLESTEREMIA: ICD-10-CM

## 2024-07-16 DIAGNOSIS — Z79.890 LONG-TERM CURRENT USE OF TESTOSTERONE REPLACEMENT THERAPY: ICD-10-CM

## 2024-07-16 DIAGNOSIS — R79.89 LOW TESTOSTERONE IN MALE: ICD-10-CM

## 2024-07-16 DIAGNOSIS — E53.8 LOW SERUM VITAMIN B12: ICD-10-CM

## 2024-07-16 DIAGNOSIS — I25.10 CORONARY ARTERY CALCIFICATION: ICD-10-CM

## 2024-07-16 DIAGNOSIS — E03.9 HYPOTHYROIDISM, UNSPECIFIED TYPE: ICD-10-CM

## 2024-07-16 DIAGNOSIS — E83.52 HYPERCALCEMIA: ICD-10-CM

## 2024-07-16 LAB
ALBUMIN SERPL-MCNC: 4.6 G/DL (ref 3.2–4.8)
ANION GAP SERPL CALC-SCNC: 3.5 MMOL/L (ref 0–18)
BUN BLD-MCNC: 14 MG/DL (ref 9–23)
CALCIUM BLD-MCNC: 11.2 MG/DL (ref 8.7–10.4)
CHLORIDE SERPL-SCNC: 107 MMOL/L (ref 98–112)
CHOLEST SERPL-MCNC: 202 MG/DL (ref ?–200)
CO2 SERPL-SCNC: 29.5 MMOL/L (ref 21–32)
CREAT BLD-MCNC: 1.38 MG/DL
EGFRCR SERPLBLD CKD-EPI 2021: 62 ML/MIN/1.73M2 (ref 60–?)
ERYTHROCYTE [DISTWIDTH] IN BLOOD BY AUTOMATED COUNT: 14.4 %
FASTING PATIENT LIPID ANSWER: YES
GLUCOSE BLD-MCNC: 102 MG/DL (ref 70–99)
HCT VFR BLD AUTO: 49.7 %
HDLC SERPL-MCNC: 36 MG/DL (ref 40–59)
HGB BLD-MCNC: 16.8 G/DL
LDLC SERPL CALC-MCNC: 132 MG/DL (ref ?–100)
MCH RBC QN AUTO: 29.8 PG (ref 26–34)
MCHC RBC AUTO-ENTMCNC: 33.8 G/DL (ref 31–37)
MCV RBC AUTO: 88.3 FL
NONHDLC SERPL-MCNC: 166 MG/DL (ref ?–130)
OSMOLALITY SERPL CALC.SUM OF ELEC: 291 MOSM/KG (ref 275–295)
PHOSPHATE SERPL-MCNC: 2 MG/DL (ref 2.4–5.1)
PLATELET # BLD AUTO: 236 10(3)UL (ref 150–450)
POTASSIUM SERPL-SCNC: 4.2 MMOL/L (ref 3.5–5.1)
RBC # BLD AUTO: 5.63 X10(6)UL
SODIUM SERPL-SCNC: 140 MMOL/L (ref 136–145)
T4 FREE SERPL-MCNC: 0.8 NG/DL (ref 0.8–1.7)
TRIGL SERPL-MCNC: 192 MG/DL (ref 30–149)
TSI SER-ACNC: 7.44 MIU/ML (ref 0.55–4.78)
VIT B12 SERPL-MCNC: 500 PG/ML (ref 211–911)
VLDLC SERPL CALC-MCNC: 35 MG/DL (ref 0–30)
WBC # BLD AUTO: 7 X10(3) UL (ref 4–11)

## 2024-07-16 PROCEDURE — 84439 ASSAY OF FREE THYROXINE: CPT

## 2024-07-16 PROCEDURE — 84403 ASSAY OF TOTAL TESTOSTERONE: CPT

## 2024-07-16 PROCEDURE — 80061 LIPID PANEL: CPT

## 2024-07-16 PROCEDURE — 82607 VITAMIN B-12: CPT

## 2024-07-16 PROCEDURE — 85027 COMPLETE CBC AUTOMATED: CPT

## 2024-07-16 PROCEDURE — 84443 ASSAY THYROID STIM HORMONE: CPT

## 2024-07-16 PROCEDURE — 80069 RENAL FUNCTION PANEL: CPT

## 2024-07-16 PROCEDURE — 36415 COLL VENOUS BLD VENIPUNCTURE: CPT

## 2024-07-16 PROCEDURE — 83970 ASSAY OF PARATHORMONE: CPT

## 2024-07-17 LAB
COMPLEXED PSA SERPL-MCNC: 1.91 NG/ML (ref ?–4)
PTH-INTACT SERPL-MCNC: 162.1 PG/ML (ref 18.5–88)
TESTOST SERPL-MCNC: 662.7 NG/DL

## 2024-07-18 ENCOUNTER — OFFICE VISIT (OUTPATIENT)
Dept: INTERNAL MEDICINE CLINIC | Facility: CLINIC | Age: 54
End: 2024-07-18
Payer: COMMERCIAL

## 2024-07-18 VITALS
RESPIRATION RATE: 18 BRPM | WEIGHT: 246 LBS | HEIGHT: 69.69 IN | SYSTOLIC BLOOD PRESSURE: 128 MMHG | HEART RATE: 90 BPM | BODY MASS INDEX: 35.62 KG/M2 | OXYGEN SATURATION: 99 % | DIASTOLIC BLOOD PRESSURE: 74 MMHG | TEMPERATURE: 98 F

## 2024-07-18 DIAGNOSIS — I10 PRIMARY HYPERTENSION: Primary | ICD-10-CM

## 2024-07-18 DIAGNOSIS — E78.00 HYPERCHOLESTEREMIA: ICD-10-CM

## 2024-07-18 DIAGNOSIS — E21.0 PRIMARY HYPERPARATHYROIDISM (HCC): ICD-10-CM

## 2024-07-18 DIAGNOSIS — M79.10 MYALGIA: ICD-10-CM

## 2024-07-18 PROCEDURE — 99214 OFFICE O/P EST MOD 30 MIN: CPT | Performed by: FAMILY MEDICINE

## 2024-07-18 RX ORDER — LOSARTAN POTASSIUM 25 MG/1
25 TABLET ORAL DAILY
Qty: 30 TABLET | Refills: 0 | Status: SHIPPED | OUTPATIENT
Start: 2024-07-18 | End: 2024-07-18

## 2024-07-18 RX ORDER — EZETIMIBE 10 MG/1
10 TABLET ORAL NIGHTLY
Qty: 90 TABLET | Refills: 1 | Status: SHIPPED | OUTPATIENT
Start: 2024-07-18

## 2024-07-18 RX ORDER — LOSARTAN POTASSIUM 25 MG/1
25 TABLET ORAL DAILY
Qty: 90 TABLET | Refills: 0 | Status: SHIPPED | OUTPATIENT
Start: 2024-07-18

## 2024-07-18 NOTE — PROGRESS NOTES
Hany Fontanez  6/19/1970    Chief Complaint   Patient presents with    ER F/U     ES rm - 9 - 7/1 f/u for elevated BP       HPI:   Hany Fontanez is a 54 year old male who presents for follow-up. His BP has been labile and was seen in the ED for elevated blood pressures. Was discharged on losartan which he has taken intermittently, but did take this AM. He has upcoming endo follow-up and will schedule his 4D parathyroid scan.     Current Outpatient Medications   Medication Sig Dispense Refill    ezetimibe 10 MG Oral Tab Take 1 tablet (10 mg total) by mouth nightly. 90 tablet 1    losartan 25 MG Oral Tab Take 1 tablet (25 mg total) by mouth daily. 90 tablet 0    Syringe 18G X 1-1/2\" 3 ML Does not apply Misc Draw up testosterone twice a week 100 each 1    ARMOUR THYROID 90 MG Oral Tab Take 1 tablet (90 mg total) by mouth daily. 90 tablet 1    Needle, Disp, (BD DISP NEEDLE) 25G X 1\" Does not apply Misc USE AS DIRECTED TWICE WEEKLY 100 each 0    testosterone cypionate 200 mg/mL Intramuscular Solution Inject 0.5 mL (100 mg total) into the muscle twice a week. 26 mL 1    methylphenidate ER 20 MG Oral Capsule SR 24 Hr Take 1 capsule (20 mg total) by mouth every morning.      methylphenidate 5 MG Oral Tab Take 1-2 tablets (5-10 mg total) by mouth daily as needed. IN THE AFTERNOON      BD SYRINGE SLIP TIP 1 ML Does not apply Misc USE FOR TESTOSTERONE TWICE WEEKLY.      Carboxymeth-Cellulose-CitricAc (PLENITY) Oral Cap Take 3 capsules by mouth 2 (two) times daily before meals. 180 capsule 1    Insulin Syringe-Needle U-100 25G X 1\" 1 ML Does not apply Misc To be used once a month for vitamin B12 injections 6 each 0    Cholecalciferol (VITAMIN D3) 25 MCG (1000 UT) Oral Cap Take 2 tablets by mouth daily. 5,000 also has k-2        No Known Allergies   Past Medical History:    Attention deficit hyperactivity disorder (ADHD)    COVID-19    fatigue    Disorder of thyroid    High cholesterol    Hyperlipidemia    Low  testosterone    Thyroid disease    Visual impairment    glasses      Patient Active Problem List   Diagnosis    Hypercholesteremia    Hypothyroidism    Attention deficit hyperactivity disorder (ADHD), predominantly inattentive type    Low testosterone in male    Primary hyperparathyroidism (HCC)    Anxiety and depression    Coronary artery calcification    Statin intolerance      Past Surgical History:   Procedure Laterality Date    Other surgical history      Rotator cuff repair Right 01/2023      Family History   Problem Relation Age of Onset    Heart Disorder Father         Cardiac bypass    No Known Problems Mother       Social History     Socioeconomic History    Marital status:    Tobacco Use    Smoking status: Never    Smokeless tobacco: Never   Vaping Use    Vaping status: Never Used   Substance and Sexual Activity    Alcohol use: Yes     Alcohol/week: 2.0 standard drinks of alcohol     Types: 2 Standard drinks or equivalent per week    Drug use: Never   Other Topics Concern    Caffeine Concern Yes     Comment: coffee- 3 cups a day,Sugar free Redbull - couple a week, Black tea Once a day         REVIEW OF SYSTEMS:   GENERAL: no fever or chills, no new CP or SOB. Intermittent HA when he feels his BP is high.     EXAM:   /74 (BP Location: Left arm, Patient Position: Sitting, Cuff Size: large)   Pulse 90   Temp 98.2 °F (36.8 °C) (Temporal)   Resp 18   Ht 5' 9.69\" (1.77 m)   Wt 246 lb (111.6 kg)   SpO2 99%   BMI 35.62 kg/m²   GENERAL: Well developed, well nourished,in no apparent distress  SKIN: No rash  EYES: PERRLA, EOMI, conjunctiva are clear  HEENT: atraumatic, normocephalic  LUNGS: clear to auscultation  CARDIO: RRR without murmur    ASSESSMENT AND PLAN:   Hany Fontanez is a 54 year old male who presents for follow-up    Primary hypertension  BP has been labile over the last year. Recommend continuing daily losartan and home monitoring as he is tolerating treatment well and BP  has improved. Follow-up in 3 months.   - losartan 25 MG Oral Tab; Take 1 tablet (25 mg total) by mouth daily.  Dispense: 90 tablet; Refill: 0    Primary hyperparathyroidism (HCC)  PTH and Ca elevated. He will schedule his 4D parathyroid scan. Has upcoming follow-up with endo.     Hypercholesteremia, statin myalgias  Encouraged consistent daily use of Zetia. Repeat lipid panel in 3-6 months. Consider repeat [facility] in 2025.   - ezetimibe 10 MG Oral Tab; Take 1 tablet (10 mg total) by mouth nightly.  Dispense: 90 tablet; Refill: 1      All questions were answered and the patient agrees with the plan.     Thank you,  Lamin Persaud MD

## 2024-08-02 ENCOUNTER — OFFICE VISIT (OUTPATIENT)
Facility: CLINIC | Age: 54
End: 2024-08-02
Payer: COMMERCIAL

## 2024-08-02 VITALS
HEART RATE: 80 BPM | SYSTOLIC BLOOD PRESSURE: 130 MMHG | RESPIRATION RATE: 18 BRPM | OXYGEN SATURATION: 99 % | BODY MASS INDEX: 35.62 KG/M2 | HEIGHT: 69.69 IN | WEIGHT: 246 LBS | DIASTOLIC BLOOD PRESSURE: 82 MMHG

## 2024-08-02 DIAGNOSIS — E03.9 HYPOTHYROIDISM, UNSPECIFIED TYPE: Primary | ICD-10-CM

## 2024-08-02 DIAGNOSIS — R79.89 LOW TESTOSTERONE IN MALE: ICD-10-CM

## 2024-08-02 PROCEDURE — 99214 OFFICE O/P EST MOD 30 MIN: CPT | Performed by: STUDENT IN AN ORGANIZED HEALTH CARE EDUCATION/TRAINING PROGRAM

## 2024-08-02 RX ORDER — TESTOSTERONE CYPIONATE 200 MG/ML
100 INJECTION, SOLUTION INTRAMUSCULAR
Qty: 26 ML | Refills: 1 | Status: SHIPPED | OUTPATIENT
Start: 2024-08-02

## 2024-08-02 NOTE — PATIENT INSTRUCTIONS
Please keep me updated after your parathyroid scan and if we're not moving toward surgery, we might consider a trial of the parathyroid blocking medication (cinacalcet).   Your TSH this time was high which is a little illogical, so let's retest the whole panel.     Return Visit   [  ] 6 months  [  ] After visit summary   [  ] Fasting/8AM labs

## 2024-08-05 ENCOUNTER — TELEPHONE (OUTPATIENT)
Facility: CLINIC | Age: 54
End: 2024-08-05

## 2024-08-05 ENCOUNTER — LAB ENCOUNTER (OUTPATIENT)
Dept: LAB | Facility: HOSPITAL | Age: 54
End: 2024-08-05
Attending: STUDENT IN AN ORGANIZED HEALTH CARE EDUCATION/TRAINING PROGRAM
Payer: COMMERCIAL

## 2024-08-05 DIAGNOSIS — E03.9 HYPOTHYROIDISM, UNSPECIFIED TYPE: Primary | ICD-10-CM

## 2024-08-05 DIAGNOSIS — E03.9 HYPOTHYROIDISM, UNSPECIFIED TYPE: ICD-10-CM

## 2024-08-05 LAB
T3 SERPL-MCNC: 1.16 NG/ML (ref 0.6–1.81)
T4 FREE SERPL-MCNC: 0.7 NG/DL (ref 0.8–1.7)
TSI SER-ACNC: 6.57 MIU/ML (ref 0.55–4.78)

## 2024-08-05 PROCEDURE — 84439 ASSAY OF FREE THYROXINE: CPT

## 2024-08-05 PROCEDURE — 36415 COLL VENOUS BLD VENIPUNCTURE: CPT

## 2024-08-05 PROCEDURE — 84443 ASSAY THYROID STIM HORMONE: CPT

## 2024-08-05 PROCEDURE — 84480 ASSAY TRIIODOTHYRONINE (T3): CPT

## 2024-08-05 RX ORDER — LEVOTHYROXINE SODIUM 50 MCG
50 TABLET ORAL
Qty: 90 TABLET | Refills: 0 | Status: SHIPPED | OUTPATIENT
Start: 2024-08-05

## 2024-08-05 NOTE — TELEPHONE ENCOUNTER
Since TSH is persistently elevated I would be recommend starting the additional LT4 on top of his normal [place] until the week before his procedure while awaiting the T4 by dialysis.

## 2024-08-05 NOTE — TELEPHONE ENCOUNTER
RN phoned patient and reviewed below- patient agreed to starting Synthroid 50 mcg daily along with [place] 90.    Reviewed will need to hold medication for 1 week prior to NM parathyroid scan- verbalized understanding of all.    Confirmed pharmacy - ordered per written order and routed as [facility]ONIT

## 2024-08-05 NOTE — TELEPHONE ENCOUNTER
Received call from patient stating he was told to hold medication for 1 week prior to NM scan- scheduled for 8/19    States currently on 90 mg [place] thyroid    Patient just had labs that resulted in EPIC    Routed for review

## 2024-08-05 NOTE — TELEPHONE ENCOUNTER
Hi - we repeated his thyroid labs to ensure accuracy since we were surprised he seemed to be hypothyroid on his last labs. The repeat TSH remains high and T4 is now low; a T4 by dialysis (the more accurate version of the lab) is pending and will take several days to result. However, now that he has had two high TSH levels it seems more likely that he is truly undertreated. Sometimes this can occur when there is too high of a T3 component as compared to T4 in the thyroid supplement (as can occur with formulations such as Alpine). We could have him add in some extra levothyroxine to his [place] 90 (ie 50mg Synthroid daily).     To my knowledge, levothyroxine does not affect parathyroid scan results strongly, however if this is a policy from nuclear medicine then that would need to be confirmed with their department. This would not be preferred as he seems to be hypothyroid currently (though he should be okay clinically if he needs to hold it for a week). If holding thyroid medication is optional per their department then I would prefer he continue it.

## 2024-08-05 NOTE — TELEPHONE ENCOUNTER
RN phoned NM department and spoke with Don- confirms patient is to hold medication for 1 week.    Routed for review

## 2024-08-06 NOTE — PROGRESS NOTES
Return Office Visit      Today's date:  8/2/24      HISTORY OF PRESENT ILLNESS:  Hany Fontanez is a 54 year old male who presents for follow up for hypothyroidism, hypogonadism and primary hyperparathyroidism.    Initial consult April 2022:  HyperCa hx:  Patient states that he has been aware of mildly elevated calcium for the past few years, with recent elevated PTH brought to his attention.  Denies any thiazides, lithium, excess dairy dairy.  Is physically active with overall good energy.   Denies any history of fractures or stones.  Denies any familial hypercalcemia.  Is interested in surgery if it can definitely reverse the hyperPTH.     Reviewed labs:  .9  Ca 10.7-11.1 (2022)  Vit D n/a  Cr wnl  Urine Ca n/a  Hx of fx: no  Hx of stones: possibly had a small stone 'years ago'     Hypogonadism hx:  Started in [place] >10 years ago, seen a 'hormone doctor'. Previously very physically active and felt sx of hypogonadism including weight gain and fatigue. Started on gels, then switched to injections.  At one point his testosterone was >1000, which he was cautioned about. Knows all the risks associated with testosterone replacement. Gets phlebotomized frequently for polycythemia.      Currently takes IM testosterone 100mg twice a week   Also getting anastrozole 0.25mg twice a week (from his [place] doctor). Understands that I will not be prescribing it.  Most recent AM fasting testosterone 927.9, Hbg 17.5, hematocrit 51.1     Hypothyroid hx:  Been on [place] 1 grain daily, for >10 years now.   Feels better on this than levothyroxine.  Most recent TFTs wnl.    Interim hx:  April 2022 - urine Ca elevated at 558  May 2022 - DXA showed normal BMD  June 2022 - Sestamibi did not identify any parathyroid adenoma.; saw ENT  June 2022 - thyroid US showing no nodules (ordered by ENT Dr Eng); plan is to repeat PTH again  July labs show mildly low fT4. Vit D at goal. H&H elevated, pt gets phlebotomy. Testosterone  level still in process. Getting IM Testosterone 100mg twice a week.  Getting phlebotomized every couple of months at a lab in [place].  July 2022 - [place] at 3/4 grain, fT4 0.7, TSH 3.36.     Jan 2023 visit  8/2022 - repeat .9, Ca 11.1; pt will be following up with another ENT  1/2023 - total T 838, TSH 2.55, TT3 120, fT4 0.7, H&H 18.3/54.7; he states it used to be higher but he would do blood donations, hasn't done one recently, doesn't want to decrease dose of T  Currently on [place] 75mg daily; takes on its own in the morning without other medications  Currently IM Test 100mg twice a week  Just had R shoulder surgery, in sling    July 2023 visit  Feels well on current TRT; to prevent polycythemia, he is donating blood q6-8 weeks. States he'll need a doctor's note if he hypothetically  needs to donate more freq (eg q4 weeks)  Started on Wegov[place] with medical weight management clinic; lost some weight then plateaued  Feels well on current [place] dose as well  Still traveling a lot for work; stays physically active    Feb 2024 11/2023 - PTH 69, vit D 18, Ca 12.0 -- vit D repleted; recommended to discuss with ENT again given Ca rise  12/2023 - fT4 0.7, TT3 113, TSH 2.59, PSA 2.0, vit D 36 -- remains on [place] 90mg (new brand from pharmacy, trying to see how it goes before dose increase)  1/2024 -  total Testosterone 686, Estradiol 32, H&H 16.7/50.5   Has been traveling a lot for work, a lot more responsibilities at work  Has seen Dr. Rojas (ENT) office but still not able to localize (4D CT) parathyroid adenoma    Aug 2024  -Remains on [place] 90mg daily, not taking extra. Recent TSH was elevated and pt feels this may be a lab error as he has been on a stable dose for many years.   -Has not changed T regimen, continuing to follow with [place] physician  -Calcium remains elevated, repeat parathyroid scan pending with ENT      CURRENT MEDICATION:    Current Outpatient Medications   Medication Sig Dispense  Refill    testosterone cypionate 200 mg/mL Intramuscular Solution Inject 0.5 mL (100 mg total) into the muscle twice a week. 26 mL 1    ezetimibe 10 MG Oral Tab Take 1 tablet (10 mg total) by mouth nightly. 90 tablet 1    losartan 25 MG Oral Tab Take 1 tablet (25 mg total) by mouth daily. 90 tablet 0    Syringe 18G X 1-1/2\" 3 ML Does not apply Misc Draw up testosterone twice a week 100 each 1    ARMOUR THYROID 90 MG Oral Tab Take 1 tablet (90 mg total) by mouth daily. 90 tablet 1    Needle, Disp, (BD DISP NEEDLE) 25G X 1\" Does not apply Misc USE AS DIRECTED TWICE WEEKLY 100 each 0    methylphenidate ER 20 MG Oral Capsule SR 24 Hr Take 1 capsule (20 mg total) by mouth every morning.      methylphenidate 5 MG Oral Tab Take 1-2 tablets (5-10 mg total) by mouth daily as needed. IN THE AFTERNOON      BD SYRINGE SLIP TIP 1 ML Does not apply Misc USE FOR TESTOSTERONE TWICE WEEKLY.      Carboxymeth-Cellulose-CitricAc (PLENITY) Oral Cap Take 3 capsules by mouth 2 (two) times daily before meals. 180 capsule 1    Insulin Syringe-Needle U-100 25G X 1\" 1 ML Does not apply Misc To be used once a month for vitamin B12 injections 6 each 0    Cholecalciferol (VITAMIN D3) 25 MCG (1000 UT) Oral Cap Take 2 tablets by mouth daily. 5,000 also has k-2      SYNTHROID 50 MCG Oral Tab Take 1 tablet (50 mcg total) by mouth before breakfast. 90 tablet 0       Endocrine Medications            SYNTHROID 50 MCG Oral Tab    ARMOUR THYROID 90 MG Oral Tab            ALLERGY:  No Known Allergies      PAST MEDICAL, SOCIAL AND FAMILY HISTORY:  See past medical history marked as reviewed.  See past surgical history marked as reviewed.  See past family history marked as reviewed.  See past social history marked as reviewed.      REVIEW OF SYSTEMS:   Ten point review of systems has been performed and is otherwise negative and/or non-contributory, except as described above.      PHYSICAL EXAM:   Vitals:    08/02/24 1111   BP: 130/82   Pulse: 80   Resp: 18    SpO2: 99%   Weight: 246 lb (111.6 kg)   Height: 5' 9.69\" (1.77 m)       BMI: Body mass index is 35.61 kg/m².     CONSTITUTIONAL:  awake, alert, cooperative, no apparent distress, and appears stated age  PSYCH: normal affect  LUNGS: breathing comfortably  CARDIOVASCULAR:  regular rate       DATA:     US THYROID (CPT=76536)    Result Date: 6/9/2022  CONCLUSION:  Heterogeneous thyroid gland.       Dictated by (CST): Mitch Conway MD on 6/09/2022 at 4:08 PM     Finalized by (CST): Mitch Conway MD on 6/09/2022 at 4:09 PM       PROCEDURE:  NM PARATHYROID SPECT/CT (CPT=78072)       COMPARISON:  None.       INDICATIONS:  E21.0 Primary hyperparathyroidism (HCC)       TECHNIQUE:  The patient was administered 297 uCi I-123 by mouth, and the patient returned to the department 3 hours later. At that time, a total of 25.0 mCi Tc99m sestamibi was injected IV, and dual-photon acquisition was performed. A high resolution   large field-of-view series of images was then obtained of the neck and chest region at 30 minutes and 3 hours post injection of the sestamibi. I-123 pinhole images were computer subtracted from the sestamibi pinhole images on a dedicated nuclear medicine    workstation. Additional dedicated SPECT images were taken with concurrent CT scan for both anatomical localization as well as attenuation correction. Scan was reformatted into multi-planar reconstructions on a dedicated workstation.         FINDINGS:   SPECT/CT Imaging:  No thyroid nodule or mass.       I-123:  Uniform activity within a normal-sized thyroid gland       SESTAMIBI:  Uniform activity within a normal-sized thyroid gland with no mismatched activity to suggest parathyroid adenoma.  Normal distribution within soft tissues, including salivary glands.       3 HOUR WASHOUT:  No evidence for ectopic sestamibi to suggest ectopic parathyroid adenoma.                Normal washout from thyroid gland after 3 hours.         Impression   CONCLUSION:  No evidence  of parathyroid adenoma.       Dictated by (CST): Mitch Conway MD on 6/01/2022 at 2:22 PM       Finalized by (CST): Mitch Conway MD on 6/01/2022 at 2:24 PM        PROCEDURE:  XR DEXA BONE DENSITOMETRY (CPT=77080)       COMPARISON:  None.       INDICATIONS:    E21.0 Primary hyperparathyroidism (HCC)       PATIENT STATED HISTORY: (As transcribed by Technologist)  Hyperparathyoidism.            LUMBAR SPINE ANALYSIS RESULTS:       Bone mineral density (BMD) (g/cm2):  1.001     Lumbar T-Score:  -0.8       % young normals:  92       % age matched controls:  95       Change from prior spine examination:  n/a                TOTAL HIP ANALYSIS RESULTS:         Bone mineral density (BMD) (g/cm2):  1.053       Total Hip T-Score:  0.1       % young normals:  102       % age matched controls:  107       Change from prior hip examination:  n/a                FEMORAL NECK ANALYSIS RESULTS:         Bone mineral density (BMD) (g/cm2):  0.868       Femoral neck T-Score:  -0.5       % young normals:  93       % age matched controls:  105       Change from prior hip examination:  n/a       LEFT FOREARM ANALYSIS:   Bone mineral density:  0.892         T-score:  1.4     % young normal:  109   % age match controls:  112            ADDITIONAL FINDINGS:  The Z-scores in left femoral neck, left total hip, lumbar spine and left forearm are 0.3, 0.4, -0.5 and 1.9 respectively              Impression   CONCLUSION:  Bone mineral density is in the normal range.      ASSESSMENT AND PLAN:    (E21.0) Primary hyperparathyroidism (HCC)  (primary encounter diagnosis)  Plan:   - elevated PTH, elevated Ca, elevated 24hr urine Ca c/w primary hyperPTH  - sestamibi and thyroid US in May 2022 shows no adenoma  - 4D CT neck in 11/2023 also non-localizing  - DXA 2022 with normal BMD  - pt has seen 2 ENTs at [facility] to discuss  - continue medical monitoring  - if Ca continues to climb and not surgery is planned, can trial sensipar - will touch base after  upcoming Sestamibi  - labs q6 months    (R79.89) Low testosterone in male  Plan:   - cont IM test 100mg twice a week   - Testosterone and CBC at goal; pt would like to incr Testosterone from 600s to 800s, it was previously >1000; we've extensively discussed risks and benefits. Again reviewed risks of extremely high T levels.     (E03.9) Hypothyroidism, unspecified type  Plan: clinically and biochemically euthyroid. He has tried LT4 before but feels consistently better on [place]. He's aware of the diff in ratio of T4:T3 in [place] compared to that of a human thyroid.   - TSH elevated, pt concerned about lab accuracy so will repeat thyroid panel prior to making any med changes  - continue [place] 90mg daily  - TFTs q6 months      Return to Clinic in 6 months      The above plan was discussed in detail with the patient who verbalized understanding and agreement.      Geraldine Kaba DO  [facility]  8/2/2024     Note to patient: The 21 Century Cures Act makes medical notes like these available to patients in the interest of transparency. However, be advised this is a medical document. It is intended as peer to peer communication. It is written in medical language and may contain abbreviations or verbiage that are unfamiliar. It may appear blunt or direct. Medical documents are intended to carry relevant information, facts as evident, and the clinical opinion of the practitioner.

## 2024-08-12 LAB — T4 FREE DIALYSIS/MS: 0.68 NG/DL

## 2024-08-14 ENCOUNTER — TELEPHONE (OUTPATIENT)
Facility: CLINIC | Age: 54
End: 2024-08-14

## 2024-08-14 NOTE — TELEPHONE ENCOUNTER
Patient phoned office stating that he has NM Parathyroid Scan scheduled  and wants to review medication directions. Reviewed with patient Per NM Directions in Appt notes:    \"EDW NM PARATHYROID PANEL  Nothing to eat or drink (NPO) 4 hours prior to the dosing.    THE FOLLOWING MEDICATION RESTRICTIONS MUST BE OBSERVED PRIOR TO THE EXAM:    Stop taking Thyroid medications:    5 DAYS PRIOR to exam: do not take PTU (Propylthiouracil or Tapazol (Methimazole).    1 WEEK PRIOR to exam: do not take Synthroid (Levothyroxine,Amour Thyroid).    6 WEEKS PRIOR to exam: do not take Lugol solution (iodine contrast), Amiodarone, X-RAY IVP or CT SCAN DYE/CONTRAST.    AVOID SEAFOOD, KELP AND MULTIVITAMINS AFTER SCHEDULING THIS APPOINTMENT.    You will receive a capsule in the morning and will be instructed after to return 3 hours later. You will then receive an injection and images will be taken. After a 3 hour delay a final set of images will be taken.    There are no side effects to the capsule or injection.    The approximate duration can be up to 6 hours from the time you take the capsule until the last set of images are taken. You are able to leave and come back every 3 hours, should you wish.\"    No further questions from patient.    Closing this encounter.

## 2024-08-14 NOTE — TELEPHONE ENCOUNTER
Patient phoned office stating he has not been able to  Synthroid- per pharmacy, waiting on our office. Reviewed we have note heard anything from pharmacy yet, but will f/u.    Phoned pharmacy, reviewed we were looking for [facility] Synthroid rx, states will need PA- only message they are getting when ran through insurance,  \"Product is not covered\".     Phoned patient's insurance at [phone]. Initiated PA for Synthroid.    Case ID # [id number]    They will be faxing form for completion.

## 2024-08-17 ENCOUNTER — PATIENT MESSAGE (OUTPATIENT)
Facility: CLINIC | Age: 54
End: 2024-08-17

## 2024-08-19 ENCOUNTER — HOSPITAL ENCOUNTER (OUTPATIENT)
Dept: NUCLEAR MEDICINE | Facility: HOSPITAL | Age: 54
Discharge: HOME | End: 2024-08-19
Attending: STUDENT IN AN ORGANIZED HEALTH CARE EDUCATION/TRAINING PROGRAM
Payer: COMMERCIAL

## 2024-08-19 DIAGNOSIS — E21.0 PRIMARY HYPERPARATHYROIDISM (HCC): ICD-10-CM

## 2024-08-19 PROCEDURE — 78072 PARATHYRD PLANAR W/SPECT&CT: CPT | Performed by: STUDENT IN AN ORGANIZED HEALTH CARE EDUCATION/TRAINING PROGRAM

## 2024-08-19 NOTE — TELEPHONE ENCOUNTER
Okay to continue on [place] at this time until we complete prior authorization for Symthroid. Since he just completed labs on 8/5 he does not need a repeat set at this time. Dr. Kaba will be back in the office on 8/21 and can touch base if she requires further labs. Please let me know if he has any other questions, thanks!

## 2024-08-19 NOTE — TELEPHONE ENCOUNTER
Phoned patient to update on below, he verbalized understanding.    States he paid out of pocket to  Synthroid today- about $60.    He wants to confirm if it is ok to take Synthroid and [place]vant both at the same time? He has read he should separate them?     Also, wants Dr. Kaba to review NM Parathyroid scan result that is now in system.

## 2024-08-19 NOTE — TELEPHONE ENCOUNTER
Pt presented to office.    He is here doing his Nuc Med scanning, and came to office in between.     He is wondering about getting another set of thyroid labs as a \"baseline\" now that he has been off meds for 5 days leading up to scan.    Dr. Kaba's note from 8/5 TE:    Hi - we repeated his thyroid labs to ensure accuracy since we were surprised he seemed to be hypothyroid on his last labs. The repeat TSH remains high and T4 is now low; a T4 by dialysis (the more accurate version of the lab) is pending and will take several days to result. However, now that he has had two high TSH levels it seems more likely that he is truly undertreated. Sometimes this can occur when there is too high of a T3 component as compared to T4 in the thyroid supplement (as can occur with formulations such as [place]). We could have him add in some extra levothyroxine to his [place] 90 (ie 50mg Synthroid daily).

## 2024-08-26 NOTE — TELEPHONE ENCOUNTER
8/26/24 rcvd via fax from [facility]     Denial for Synthroid 50 mcg tabs    Fax in [address]

## 2024-08-28 ENCOUNTER — TELEPHONE (OUTPATIENT)
Facility: CLINIC | Age: 54
End: 2024-08-28

## 2024-08-28 DIAGNOSIS — E03.9 HYPOTHYROIDISM, UNSPECIFIED TYPE: Primary | ICD-10-CM

## 2024-08-28 RX ORDER — THYROID 60 MG
60 TABLET ORAL DAILY
Qty: 90 TABLET | Refills: 1 | Status: SHIPPED | OUTPATIENT
Start: 2024-08-28

## 2024-08-28 RX ORDER — THYROID,PORK 15 MG
75 TABLET ORAL DAILY
Qty: 450 TABLET | Refills: 1 | Status: CANCELLED | OUTPATIENT
Start: 2024-08-28

## 2024-08-28 RX ORDER — THYROID,PORK 15 MG
15 TABLET ORAL DAILY
Qty: 90 TABLET | Refills: 1 | Status: SHIPPED | OUTPATIENT
Start: 2024-08-28

## 2024-08-28 NOTE — TELEPHONE ENCOUNTER
Patient phoned into clinic requesting refill of 60mcg and 15mcg of [place] [facility] thyroid rx. States was trying to work his way up to taking 90mcg dose but for now the current dosage is working.     Routing to pool for review.

## 2024-08-28 NOTE — TELEPHONE ENCOUNTER
Patient calling RN. Patient states he takes Synthroid 50 mcg daily and [place] Thyroid total of 75 mg daily. Patient divides [place] Thyroid; takes 60 mg Q AM and 15 mg at 1300. Has been on this schedule for 2-3 days and feels \"decent\". Patient attempted to take [place] Thyroid 90 mg but decreased dose because he felt \"jittery\". Patient has titrated up from 30 mg-->60 mg--> and is now on the current dose of 75 mg daily. Patient \"not sure\" if he'll ever make it to the 90 mg daily dose.    Patient requesting refills of [place] Thyroid 60 mg and 15 mg.    Refills pended and routed to Dr. Kaba for approval.

## 2024-08-28 NOTE — TELEPHONE ENCOUNTER
[facility] message sent to patient to confirm exactly how he is taking his medication.

## 2024-08-29 NOTE — TELEPHONE ENCOUNTER
Ordered 60 + 15. Would repeat TFTs next month. If TSH remains elevated, will increase Synthroid rather than [place] as the T3 component of [place] can cause jitteriness and other sx.

## 2024-08-29 NOTE — TELEPHONE ENCOUNTER
RN sent patient a [place] message with Dr. Kaba's below response/orders.    TFTs with Free T4 by dialysis pended (labs to be done next month) and routed to Dr. Kaba for approval.    Message routed to Dr. Kaba.

## 2024-09-13 ENCOUNTER — MED REC SCAN ONLY (OUTPATIENT)
Facility: CLINIC | Age: 54
End: 2024-09-13

## 2024-10-14 ENCOUNTER — TELEPHONE (OUTPATIENT)
Dept: INTERNAL MEDICINE CLINIC | Facility: CLINIC | Age: 54
End: 2024-10-14

## 2024-10-14 NOTE — TELEPHONE ENCOUNTER
Patient is scheduled for thoracic surgery with Dr. Arriaga on 10/30/2024. Forms in Red folder.     Phone: [phone]  Fax: [phone]

## 2024-10-17 ENCOUNTER — TELEPHONE (OUTPATIENT)
Dept: INTERNAL MEDICINE CLINIC | Facility: CLINIC | Age: 54
End: 2024-10-17

## 2024-10-17 ENCOUNTER — OFFICE VISIT (OUTPATIENT)
Dept: INTERNAL MEDICINE CLINIC | Facility: CLINIC | Age: 54
End: 2024-10-17
Payer: COMMERCIAL

## 2024-10-17 VITALS
DIASTOLIC BLOOD PRESSURE: 78 MMHG | HEART RATE: 88 BPM | BODY MASS INDEX: 37 KG/M2 | OXYGEN SATURATION: 96 % | TEMPERATURE: 97 F | WEIGHT: 254 LBS | SYSTOLIC BLOOD PRESSURE: 126 MMHG

## 2024-10-17 DIAGNOSIS — E21.0 PRIMARY HYPERPARATHYROIDISM (HCC): ICD-10-CM

## 2024-10-17 DIAGNOSIS — D35.1: ICD-10-CM

## 2024-10-17 DIAGNOSIS — F90.0 ATTENTION DEFICIT HYPERACTIVITY DISORDER (ADHD), PREDOMINANTLY INATTENTIVE TYPE: ICD-10-CM

## 2024-10-17 DIAGNOSIS — I25.10 CORONARY ARTERY CALCIFICATION: ICD-10-CM

## 2024-10-17 DIAGNOSIS — I10 PRIMARY HYPERTENSION: ICD-10-CM

## 2024-10-17 DIAGNOSIS — E03.9 HYPOTHYROIDISM, UNSPECIFIED TYPE: ICD-10-CM

## 2024-10-17 DIAGNOSIS — Z01.818 PREOPERATIVE EXAMINATION: Primary | ICD-10-CM

## 2024-10-17 DIAGNOSIS — R79.89 LOW TESTOSTERONE IN MALE: ICD-10-CM

## 2024-10-17 DIAGNOSIS — E78.00 HYPERCHOLESTEREMIA: ICD-10-CM

## 2024-10-17 PROCEDURE — 99214 OFFICE O/P EST MOD 30 MIN: CPT | Performed by: FAMILY MEDICINE

## 2024-10-17 NOTE — PROGRESS NOTES
Hany Fontanez  6/19/1970    No chief complaint on file.      HPI:   Hany Fontanez is a 54 year old male who presents for preoperative examination prior to his robotic excision of intrathoracic parathyroid adenoma scheduled for 10/30/2024.  He has had no new chest pain or dyspnea.  He has been consistent with his current medications except for the use of losartan as his blood pressure has been controlled off the medication.    Current Outpatient Medications   Medication Sig Dispense Refill    ARMOUR THYROID 15 MG Oral Tab Take 1 tablet (15 mg total) by mouth daily. + 1 tablet (60 mg) by mouth daily (75 mg total daily dose) 90 tablet 1    ARMOUR THYROID 60 MG Oral Tab Take 1 tablet (60 mg total) by mouth daily. + 1 tablet (15 mg) by mouth daily (75 mg total daily dose) 90 tablet 1    SYNTHROID 50 MCG Oral Tab Take 1 tablet (50 mcg total) by mouth before breakfast. 90 tablet 0    testosterone cypionate 200 mg/mL Intramuscular Solution Inject 0.5 mL (100 mg total) into the muscle twice a week. 26 mL 1    ezetimibe 10 MG Oral Tab Take 1 tablet (10 mg total) by mouth nightly. 90 tablet 1    losartan 25 MG Oral Tab Take 1 tablet (25 mg total) by mouth daily. 90 tablet 0    Syringe 18G X 1-1/2\" 3 ML Does not apply Misc Draw up testosterone twice a week 100 each 1    Needle, Disp, (BD DISP NEEDLE) 25G X 1\" Does not apply Misc USE AS DIRECTED TWICE WEEKLY 100 each 0    methylphenidate ER 20 MG Oral Capsule SR 24 Hr Take 1 capsule (20 mg total) by mouth every morning.      methylphenidate 5 MG Oral Tab Take 1-2 tablets (5-10 mg total) by mouth daily as needed. IN THE AFTERNOON      BD SYRINGE SLIP TIP 1 ML Does not apply Misc USE FOR TESTOSTERONE TWICE WEEKLY.      Carboxymeth-Cellulose-CitricAc (PLENITY) Oral Cap Take 3 capsules by mouth 2 (two) times daily before meals. 180 capsule 1    Insulin Syringe-Needle U-100 25G X 1\" 1 ML Does not apply Misc To be used once a month for vitamin B12 injections 6 each 0     Cholecalciferol (VITAMIN D3) 25 MCG (1000 UT) Oral Cap Take 2 tablets by mouth daily. 5,000 also has k-2        Allergies[1]   Past Medical History:    Attention deficit hyperactivity disorder (ADHD)    COVID-19    fatigue    Disorder of thyroid    High cholesterol    Hyperlipidemia    Low testosterone    Thyroid disease    Visual impairment    glasses      Patient Active Problem List   Diagnosis    Hypercholesteremia    Hypothyroidism    Attention deficit hyperactivity disorder (ADHD), predominantly inattentive type    Low testosterone in male    Primary hyperparathyroidism (HCC)    Anxiety and depression    Coronary artery calcification    Statin intolerance      Past Surgical History:   Procedure Laterality Date    Other surgical history      Rotator cuff repair Right 01/2023      Family History   Problem Relation Age of Onset    Heart Disorder Father         Cardiac bypass    No Known Problems Mother       Social History     Socioeconomic History    Marital status:    Tobacco Use    Smoking status: Never    Smokeless tobacco: Never   Vaping Use    Vaping status: Never Used   Substance and Sexual Activity    Alcohol use: Yes     Alcohol/week: 2.0 standard drinks of alcohol     Types: 2 Standard drinks or equivalent per week    Drug use: Never   Other Topics Concern    Caffeine Concern Yes     Comment: coffee- 3 cups a day,Sugar free Redbull - couple a week, Black tea Once a day         REVIEW OF SYSTEMS:   GENERAL: feels well otherwise  SKIN: no rashes  EYES: no new vision changes  HEENT: not congested  LUNGS: no new dyspnea  CARDIOVASCULAR: no new chest pain  GI: no new abdominal pain  NEURO: no headaches    EXAM:   /78 (BP Location: Right arm, Patient Position: Sitting, Cuff Size: large)   Pulse 88   Temp 97 °F (36.1 °C) (Temporal)   Wt 254 lb (115.2 kg)   SpO2 96%   BMI 36.77 kg/m²   GENERAL: Well developed, well nourished,in no apparent distress  SKIN: No rashes,no suspicious  lesions  EYES: PERRLA, EOMI, conjunctiva are clear  HEENT: atraumatic, normocephalic,ears and throat are clear  NECK: supple,no adenopathy,no bruits  LUNGS: clear to auscultation  CARDIO: RRR without murmur  GI: good BS's,no masses, HSM or tenderness    ASSESSMENT AND PLAN:   Hany Fontanez is a 54 year old male who presents for preoperative evaluation prior to his robotic excision of his intrathoracic parathyroid adenoma. No new CP or dyspnea. RCRI score 0. Risk factors controlled.  We reviewed his preoperative labs and discussed perioperative medication management.  He may proceed with his upcoming procedure at acceptable medical risk.    Preoperative examination  Primary hyperparathyroidism (HCC)  Ectopic parathyroid adenoma  Primary hypertension  Hypercholesteremia  Coronary artery calcification  Hypothyroidism, unspecified type  Low testosterone in male  ADHD      All questions were answered and the patient agrees with the plan.     Thank you,  Lamin Persaud MD         [1] No Known Allergies

## 2024-10-25 DIAGNOSIS — I10 PRIMARY HYPERTENSION: ICD-10-CM

## 2024-10-28 RX ORDER — LOSARTAN POTASSIUM 25 MG/1
25 TABLET ORAL DAILY
Qty: 90 TABLET | Refills: 3 | Status: SHIPPED | OUTPATIENT
Start: 2024-10-28

## 2024-10-28 NOTE — TELEPHONE ENCOUNTER
Refill passed per [facility] protocols.    Requested Prescriptions   Pending Prescriptions Disp Refills    LOSARTAN 25 MG Oral Tab [Pharmacy Med Name: LOSARTAN POTASSIUM 25 MG TAB] 90 tablet 3     Sig: Take 1 tablet (25 mg total) by mouth daily.       Hypertension Medications Protocol Passed - 10/28/2024  9:15 AM        Passed - CMP or BMP in past 12 months        Passed - Last BP reading less than 140/90     BP Readings from Last 1 Encounters:   10/17/24 126/78               Passed - In person appointment or virtual visit in the past 12 mos or appointment in next 3 mos     Recent Outpatient Visits              1 week ago Preoperative examination    [facility], [address] [place], [place] [address]Kathe Michael, MD    Office Visit    2 months ago Hypothyroidism, unspecified type    [facility] Geraldine Kaba DO    Office Visit    3 months ago Primary hypertension    [address]    Office Visit    5 months ago Wellness examination    [address]    Office Visit    8 months ago Elevated blood pressure reading    [address]    Office Visit          Future Appointments         Provider Department Appt Notes    In 3 months Geraldine Kaba DO [facility], [facility] **Dr. Oneil patient**  m f/u hypercalcemia, hypothyroid, low t **40 min f/u-pz  LOV 8/2/24 w/ Sesar  45 min modifier                    Passed - EGFRCR or GFRNAA > 50     GFR Evaluation  EGFRCR: 62 , resulted on 7/16/2024

## 2024-10-31 ENCOUNTER — PATIENT MESSAGE (OUTPATIENT)
Facility: CLINIC | Age: 54
End: 2024-10-31

## 2024-10-31 DIAGNOSIS — E83.52 HYPERCALCEMIA: Primary | ICD-10-CM

## 2024-11-01 NOTE — TELEPHONE ENCOUNTER
The numbness/tingling is specific to hands, feet, and the area around the mouth/lips. Tingling in other areas of the body would usually not be related to low calcium. If experiencing any such tingling in the mentioned areas, he can take a Tums (calcium carbonate) and see if the sx resolve. If not, can call us back or page over the weekend.

## 2024-11-01 NOTE — TELEPHONE ENCOUNTER
Repeat labs ordered, he can do them at least a week after surgery week. Did they discharge on any calcium/calcitriol? Pt to let us know if any numbness/tingling of hands/fingers or numbness of his lips/face (should page weekend pager if this occurs over the weekend). Thanks!

## 2024-11-04 ENCOUNTER — NURSE TRIAGE (OUTPATIENT)
Dept: INTERNAL MEDICINE CLINIC | Facility: CLINIC | Age: 54
End: 2024-11-04

## 2024-11-04 DIAGNOSIS — R79.89 LOW TESTOSTERONE IN MALE: ICD-10-CM

## 2024-11-04 NOTE — TELEPHONE ENCOUNTER
Called patient with Dr Persaud recommendations. Appointment scheduled. Patient confirms understanding to take Losartan daily and track BP.

## 2024-11-04 NOTE — TELEPHONE ENCOUNTER
LOV: 08/02/2024     Next office visit: 02/07/2024     Last filled: 07/05/2024       Order pended and routed to provider

## 2024-11-04 NOTE — TELEPHONE ENCOUNTER
Action Requested: Summary for Provider     []  Critical Lab, Recommendations Needed  [x] Need Additional Advice  []   FYI    [x]   Need Orders  [] Need Medications Sent to Pharmacy  []  Other     SUMMARY: Pt wondering if he should take the Losartan daily? Has been taking as needed and states Dr. Persaud is aware of this. Do you want to see him in the office today or monitor and f/u later this week?     Reason for call: Blood Pressure  Onset: Last night   Reason for Disposition   Systolic BP >= 160 OR Diastolic >= 100    Protocols used: High Blood Pressure-A-[place]    Parathyroid surgery last Wednesday. Discharged Thursday   BP last night: 195/110, 179/115, 175/113 and 164/111  Took Losartan last night BP came down to 159/107  Has been taking Losartan as needed and not daily, states MK is aware of this   Has tingling and pins and needles in hands, believes this is r/t to calcium and believes BP elevation is also r/t calcium   BP this /107, HR 92  Denies chest pain, HA, visual changes and dizziness.   Some weakness and SOB which pt states is from surgery and was SOB in the hospital as well. Mostly with talking and activity   On pain meds, pain is controlled

## 2024-11-04 NOTE — TELEPHONE ENCOUNTER
11/04/2024, received via fax from [facility] for refill request for BD TUBERCULIN 1 ML SYRINGE. Placed in PA in basket in suit 208.

## 2024-11-05 ENCOUNTER — APPOINTMENT (OUTPATIENT)
Dept: GENERAL RADIOLOGY | Facility: HOSPITAL | Age: 54
End: 2024-11-05
Attending: EMERGENCY MEDICINE
Payer: COMMERCIAL

## 2024-11-05 ENCOUNTER — HOSPITAL ENCOUNTER (EMERGENCY)
Facility: HOSPITAL | Age: 54
Discharge: HOME | End: 2024-11-05
Attending: EMERGENCY MEDICINE
Payer: COMMERCIAL

## 2024-11-05 VITALS
HEART RATE: 79 BPM | WEIGHT: 254 LBS | SYSTOLIC BLOOD PRESSURE: 145 MMHG | OXYGEN SATURATION: 98 % | DIASTOLIC BLOOD PRESSURE: 97 MMHG | RESPIRATION RATE: 18 BRPM | TEMPERATURE: 98 F | BODY MASS INDEX: 37 KG/M2

## 2024-11-05 DIAGNOSIS — J98.11 ATELECTASIS: ICD-10-CM

## 2024-11-05 DIAGNOSIS — I10 PRIMARY HYPERTENSION: Primary | ICD-10-CM

## 2024-11-05 LAB
ALBUMIN SERPL-MCNC: 4.5 G/DL (ref 3.2–4.8)
ALBUMIN/GLOB SERPL: 1.7 {RATIO} (ref 1–2)
ALP LIVER SERPL-CCNC: 78 U/L
ALT SERPL-CCNC: 47 U/L
ANION GAP SERPL CALC-SCNC: 5 MMOL/L (ref 0–18)
AST SERPL-CCNC: 36 U/L (ref ?–34)
BASOPHILS # BLD AUTO: 0.06 X10(3) UL (ref 0–0.2)
BASOPHILS NFR BLD AUTO: 0.7 %
BILIRUB SERPL-MCNC: 0.5 MG/DL (ref 0.3–1.2)
BUN BLD-MCNC: 18 MG/DL (ref 9–23)
CALCIUM BLD-MCNC: 9.5 MG/DL (ref 8.7–10.4)
CHLORIDE SERPL-SCNC: 104 MMOL/L (ref 98–112)
CO2 SERPL-SCNC: 28 MMOL/L (ref 21–32)
CREAT BLD-MCNC: 1.22 MG/DL
EGFRCR SERPLBLD CKD-EPI 2021: 71 ML/MIN/1.73M2 (ref 60–?)
EOSINOPHIL # BLD AUTO: 0.2 X10(3) UL (ref 0–0.7)
EOSINOPHIL NFR BLD AUTO: 2.2 %
ERYTHROCYTE [DISTWIDTH] IN BLOOD BY AUTOMATED COUNT: 13.5 %
GLOBULIN PLAS-MCNC: 2.6 G/DL (ref 2–3.5)
GLUCOSE BLD-MCNC: 88 MG/DL (ref 70–99)
HCT VFR BLD AUTO: 46.9 %
HGB BLD-MCNC: 16.1 G/DL
IMM GRANULOCYTES # BLD AUTO: 0.14 X10(3) UL (ref 0–1)
IMM GRANULOCYTES NFR BLD: 1.6 %
LYMPHOCYTES # BLD AUTO: 1.83 X10(3) UL (ref 1–4)
LYMPHOCYTES NFR BLD AUTO: 20.5 %
MCH RBC QN AUTO: 29.8 PG (ref 26–34)
MCHC RBC AUTO-ENTMCNC: 34.3 G/DL (ref 31–37)
MCV RBC AUTO: 86.7 FL
MONOCYTES # BLD AUTO: 0.59 X10(3) UL (ref 0.1–1)
MONOCYTES NFR BLD AUTO: 6.6 %
NEUTROPHILS # BLD AUTO: 6.1 X10 (3) UL (ref 1.5–7.7)
NEUTROPHILS # BLD AUTO: 6.1 X10(3) UL (ref 1.5–7.7)
NEUTROPHILS NFR BLD AUTO: 68.4 %
OSMOLALITY SERPL CALC.SUM OF ELEC: 285 MOSM/KG (ref 275–295)
PLATELET # BLD AUTO: 260 10(3)UL (ref 150–450)
POTASSIUM SERPL-SCNC: 3.8 MMOL/L (ref 3.5–5.1)
PROT SERPL-MCNC: 7.1 G/DL (ref 5.7–8.2)
RBC # BLD AUTO: 5.41 X10(6)UL
SODIUM SERPL-SCNC: 137 MMOL/L (ref 136–145)
TROPONIN I SERPL HS-MCNC: 4 NG/L
TSI SER-ACNC: 3.41 UIU/ML (ref 0.55–4.78)
WBC # BLD AUTO: 8.9 X10(3) UL (ref 4–11)

## 2024-11-05 PROCEDURE — 80053 COMPREHEN METABOLIC PANEL: CPT | Performed by: EMERGENCY MEDICINE

## 2024-11-05 PROCEDURE — 99284 EMERGENCY DEPT VISIT MOD MDM: CPT

## 2024-11-05 PROCEDURE — 93005 ELECTROCARDIOGRAM TRACING: CPT

## 2024-11-05 PROCEDURE — 84443 ASSAY THYROID STIM HORMONE: CPT | Performed by: EMERGENCY MEDICINE

## 2024-11-05 PROCEDURE — 93010 ELECTROCARDIOGRAM REPORT: CPT

## 2024-11-05 PROCEDURE — 85025 COMPLETE CBC W/AUTO DIFF WBC: CPT | Performed by: EMERGENCY MEDICINE

## 2024-11-05 PROCEDURE — 71045 X-RAY EXAM CHEST 1 VIEW: CPT | Performed by: EMERGENCY MEDICINE

## 2024-11-05 PROCEDURE — 36415 COLL VENOUS BLD VENIPUNCTURE: CPT

## 2024-11-05 PROCEDURE — 84484 ASSAY OF TROPONIN QUANT: CPT | Performed by: EMERGENCY MEDICINE

## 2024-11-05 RX ORDER — CELECOXIB 100 MG/1
1 CAPSULE ORAL 2 TIMES DAILY WITH MEALS
COMMUNITY
Start: 2024-10-31

## 2024-11-05 RX ORDER — OXYCODONE HYDROCHLORIDE 5 MG/1
5 TABLET ORAL EVERY 4 HOURS PRN
COMMUNITY
Start: 2024-10-31

## 2024-11-05 RX ORDER — PREGABALIN 50 MG/1
50 CAPSULE ORAL 2 TIMES DAILY
COMMUNITY
Start: 2024-10-31

## 2024-11-05 NOTE — ED INITIAL ASSESSMENT (HPI)
Pt to ER s/p resection of parathyroid adenoma, pt was discharged on 10/30. Pt c/o HTN last bp was 195/120, per wife pt took a total of 50 =mg Losartan for HTN. Pt c/o mild headaches at this time.

## 2024-11-06 LAB
ATRIAL RATE: 76 BPM
P AXIS: -16 DEGREES
P-R INTERVAL: 134 MS
Q-T INTERVAL: 360 MS
QRS DURATION: 92 MS
QTC CALCULATION (BEZET): 405 MS
R AXIS: 14 DEGREES
T AXIS: -5 DEGREES
VENTRICULAR RATE: 76 BPM

## 2024-11-06 RX ORDER — NEEDLES, DISPOSABLE 25GX5/8"
NEEDLE, DISPOSABLE MISCELLANEOUS
Qty: 100 EACH | Refills: 0 | OUTPATIENT
Start: 2024-11-06

## 2024-11-06 RX ORDER — NEEDLES, DISPOSABLE 25GX5/8"
NEEDLE, DISPOSABLE MISCELLANEOUS
Refills: 0 | OUTPATIENT
Start: 2024-11-06

## 2024-11-06 NOTE — ED PROVIDER NOTES
Patient Seen in: [facility] Emergency Department      History     Chief Complaint   Patient presents with    Hypertension    Postop/Procedure Problem     Stated Complaint: HTN,parathyroid adenoma removed last wednesday 10/30    Subjective:   HPI      54-year-old male who presents to the emerged part with elevated blood pressure.  Reviewing his records he was seen on 10/30/2024 for mediastinal mass and had robotic excision of intrathoracic parathyroid adenoma.  The patient that he is notices blood pressures been elevated since that time.  He said he has been taking losartan on a as needed basis and has not had to take the medications for months up until the surgery was completed.  He has had some hoarseness postsurgery.  He had some left pleuritic lung discomfort.  Denies any fevers or chills.  Occasional tingling.  Because of the elevated blood pressure was sent here to the hospital for evaluation.  His blood pressures were as high as the 190s over 125.  When he arrives here in his evaluate his blood pressures down into the 130s he did take his losartan an extra dose of the medication per his primary care physician's orders.    Objective:     Past Medical History:    Attention deficit hyperactivity disorder (ADHD)    COVID-19    fatigue    Disorder of thyroid    High cholesterol    Hyperlipidemia    Low testosterone    Thyroid disease    Visual impairment    glasses              Past Surgical History:   Procedure Laterality Date    Other surgical history      Rotator cuff repair Right 01/2023                No pertinent social history.                Physical Exam     ED Triage Vitals [11/05/24 1514]   BP (!) 179/125   Pulse 82   Resp 18   Temp 97.6 °F (36.4 °C)   Temp src Temporal   SpO2 94 %   O2 Device None (Room air)       Current Vitals:   Vital Signs  BP: (!) 145/97  Pulse: 79  Resp: 18  Temp: 97.6 °F (36.4 °C)  Temp src: Temporal  MAP (mmHg): (!) 143    Oxygen Therapy  SpO2: 98 %  O2 Device: None (Room  air)        Physical Exam  General: This a pleasant nontoxic appearing patient in no apparent distress alert and oriented ×3.  Slight hoarseness noted.  HEENT: Pupils are equal reactive to light.  Extra ocular motions are intact.  No scleral icterus or conjunctival pallor.  Lungs: Occasional rhonchi are noted.  Good air entry noted.    Cardiac: Regular rate and rhythm.  Normal S1 and 2 without murmurs or ectopy appreciated  Abdomen:There are some mild left upper quadrant discomfort on palpation along the chest wall.    Extremities: No cyanosis, no edema or clubbing.  Pulses are +2.  Full range of motion is noted of the extremities without deformities.  No tenderness.  Neurologically intact.    ED Course     Labs Reviewed   COMP METABOLIC PANEL (14) - Abnormal; Notable for the following components:       Result Value    AST 36 (*)     All other components within normal limits   TROPONIN I HIGH SENSITIVITY - Normal   TSH W REFLEX TO FREE T4 - Normal   CBC WITH DIFFERENTIAL WITH PLATELET   RAINBOW DRAW GOLD   RAINBOW DRAW BLUE     EKG    Rate, intervals and axes as noted on EKG Report.  Rate: 76  Rhythm: Sinus Rhythm  Reading: Normal sinus rhythm nonspecific ST-T wave changes noted         Narrative  PROCEDURE:  XR CHEST AP PORTABLE  (CPT=71045)     TECHNIQUE:  AP chest radiograph was obtained.     COMPARISON:  None.     INDICATIONS:  left chest pain     PATIENT STATED HISTORY: (As transcribed by Technologist)  Pt. with left chest pain difficulty breathing. post op chest tube.           FINDINGS:  There is elevation of the left hemidiaphragm with atelectasis at the left lung base.  Heart size is within normal limits for portable technique.  Mediastinum and eddie are unremarkable.  Chest wall structures are unremarkable.                  Impression  CONCLUSION:  There is elevation left hemidiaphragm and atelectasis at left lung base.        LOCATION:                   Dictated by (CST): Bryce Reddy MD on  11/05/2024 at 6:01 PM      Finalized by (CST): Bryce Reddy MD on 11/05/2024 at 6:01 PM                MDM    Differential diagnosis includes but is not limited to calcium abnormality, thyroid abnormality, cardiac ischemia, pneumothorax, atelectasis, essential hypertension.  TSH was sent was normal.  AST of 36 the rest of the metabolic panel was normal.  Calcium was normal at 9.5.  Troponin was normal.  CBC was normal.  Chest x-ray performed.   I reviewed the x-rays and agree with the radiologist report that showed there is elevation of left hemidiaphragm and atelectasis at the left lung base.  The patient's chest discomfort is likely related to the atelectasis.  His laboratories were all normal blood pressure stayed in the 140s.  He can be discharged as an outpatient.  Told to continue with his losartan. Patient was told uses incentive spirometer postoperatively.  The patient will be discharged.  Follow-up as an outpatient with a primary care physician.  Return if symptoms progress or worsen.  Answered all of his and his wife's questions.  Note to Patient  The 21st Century Cures Act makes medical notes like these available to patients in the interest of transparency. However, be advised this is a medical document and is intended as [id number] communication; it is written in medical language and may appear blunt, direct, or contain abbreviations or verbiage that are unfamiliar. Medical documents are intended to carry relevant information, facts as evident, and the clinical opinion of the practitioner.  Patient was evaluated and a screening exam was performed.   As a treating physician attending to the patient, I determined, within reasonable clinical confidence and prior to discharge, that an emergency medical condition was not or was no longer present.  There was no indication for further evaluation, treatment or admission on an emergency basis.  Comprehensive verbal and written discharge and follow-up  instructions were provided to help prevent relapse or worsening.  Patient was instructed to follow-up with their primary care provider for further evaluation and treatment, but to return immediately to the ER for worsening, concerning, new, changing or persisting symptoms.  I discussed the case with the patient and they had no questions, complaints, or concerns.  Patient felt comfortable going home.  ^^Please note that this report has been produced using speech recognition software and may contain errors related to that system including, but not limited to, errors in grammar, punctuation, and spelling, as well as words and phrases that possibly may have been recognized inappropriately.  If there are any questions or concerns, contact the dictating provider for clarification.  Medical Decision Making      Disposition and Plan     Clinical Impression:  1. Primary hypertension    2. Atelectasis         Disposition:  Discharge  11/5/2024  6:19 pm    Follow-up:  Lamin Persaud MD  [facility] SARAH PADILLA [address] [place] [id number]  [phone]    Schedule an appointment as soon as possible for a visit in 2 day(s)            Medications Prescribed:  Current Discharge Medication List              Supplementary Documentation:

## 2024-11-06 NOTE — TELEPHONE ENCOUNTER
Received phone call from patient. States this is what he uses for Testosterone injections:    18g x 1\" to draw up Testosterone  25g x 1\" to administer Testosterone  BD Tuberculin BD 1 ml syringe    He is asking for a refill of the above.    Also notes he was in ER yesterday d/t hypertension. They did labs that he would like Dr. Kaba to review. He states he has been taking:    [place] Thyroid at 90mg daily  Synthroid 25mcg daily

## 2024-11-07 ENCOUNTER — OFFICE VISIT (OUTPATIENT)
Dept: INTERNAL MEDICINE CLINIC | Facility: CLINIC | Age: 54
End: 2024-11-07
Payer: COMMERCIAL

## 2024-11-07 VITALS
DIASTOLIC BLOOD PRESSURE: 98 MMHG | SYSTOLIC BLOOD PRESSURE: 140 MMHG | WEIGHT: 254 LBS | TEMPERATURE: 97 F | HEART RATE: 99 BPM | OXYGEN SATURATION: 95 % | BODY MASS INDEX: 37 KG/M2

## 2024-11-07 DIAGNOSIS — E21.0 PRIMARY HYPERPARATHYROIDISM (HCC): ICD-10-CM

## 2024-11-07 DIAGNOSIS — I10 ESSENTIAL HYPERTENSION: Primary | ICD-10-CM

## 2024-11-07 DIAGNOSIS — T81.82XA SUBCUTANEOUS EMPHYSEMA AFTER PROCEDURE: ICD-10-CM

## 2024-11-07 DIAGNOSIS — D35.1: ICD-10-CM

## 2024-11-07 DIAGNOSIS — R49.0 HOARSENESS OF VOICE: ICD-10-CM

## 2024-11-07 PROCEDURE — 99214 OFFICE O/P EST MOD 30 MIN: CPT | Performed by: FAMILY MEDICINE

## 2024-11-07 RX ORDER — HYDROCHLOROTHIAZIDE 12.5 MG/1
12.5 CAPSULE ORAL DAILY
Qty: 90 CAPSULE | Refills: 0 | Status: SHIPPED | OUTPATIENT
Start: 2024-11-07

## 2024-11-07 NOTE — PROGRESS NOTES
Hany Fontanez  6/19/1970    Chief Complaint   Patient presents with    ER F/U     Post op f/u and ER f/u high blood pressure readings        HPI:   Hany Fontanez is a 54 year old male who presents for follow-up.  He underwent his robotic assisted thoracoscopy with resection of his parathyroid adenoma on 10/30/2024.  His intraoperative PTH dropped as expected.  His postoperative course was notable for hoarseness and mild subcutaneous emphysema.  His blood pressures have been labile since discharge and was seen in the ER on Tuesday where he had labs that were overall normal including a normal calcium level, stable EKG, and a chest x-ray that showed some left lung atelectasis and diaphragmatic elevation and diaphragmatic elevation.  Home blood pressure readings have still been labile since discharge despite increasing his losartan to 50 mg.    Current Outpatient Medications   Medication Sig Dispense Refill    hydroCHLOROthiazide 12.5 MG Oral Cap Take 1 capsule (12.5 mg total) by mouth daily. 90 capsule 0    pregabalin 50 MG Oral Cap Take 1 capsule (50 mg total) by mouth 2 (two) times daily.      celecoxib 100 MG Oral Cap Take 1 capsule (100 mg total) by mouth 2 (two) times daily with meals.      oxyCODONE 5 MG Oral Tab Take 1 tablet (5 mg total) by mouth every 4 (four) hours as needed for Pain.      losartan 25 MG Oral Tab Take 1 tablet (25 mg total) by mouth daily. 90 tablet 3    ARMOUR THYROID 15 MG Oral Tab Take 1 tablet (15 mg total) by mouth daily. + 1 tablet (60 mg) by mouth daily (75 mg total daily dose) 90 tablet 1    ARMOUR THYROID 60 MG Oral Tab Take 1 tablet (60 mg total) by mouth daily. + 1 tablet (15 mg) by mouth daily (75 mg total daily dose) 90 tablet 1    SYNTHROID 50 MCG Oral Tab Take 1 tablet (50 mcg total) by mouth before breakfast. 90 tablet 0    testosterone cypionate 200 mg/mL Intramuscular Solution Inject 0.5 mL (100 mg total) into the muscle twice a week. 26 mL 1    ezetimibe 10  MG Oral Tab Take 1 tablet (10 mg total) by mouth nightly. 90 tablet 1    Needle, Disp, (BD DISP NEEDLE) 25G X 1\" Does not apply Misc USE AS DIRECTED TWICE WEEKLY 100 each 0    methylphenidate ER 20 MG Oral Capsule SR 24 Hr Take 1 capsule (20 mg total) by mouth every morning.      methylphenidate 5 MG Oral Tab Take 1-2 tablets (5-10 mg total) by mouth daily as needed. IN THE AFTERNOON      BD SYRINGE SLIP TIP 1 ML Does not apply Misc USE FOR TESTOSTERONE TWICE WEEKLY.      Carboxymeth-Cellulose-CitricAc (PLENITY) Oral Cap Take 3 capsules by mouth 2 (two) times daily before meals. 180 capsule 1    Cholecalciferol (VITAMIN D3) 25 MCG (1000 UT) Oral Cap Take 2 tablets by mouth daily. 5,000 also has k-2        Allergies[1]   Past Medical History:    Attention deficit hyperactivity disorder (ADHD)    COVID-19    fatigue    Disorder of thyroid    High cholesterol    Hyperlipidemia    Low testosterone    Thyroid disease    Visual impairment    glasses      Patient Active Problem List   Diagnosis    Hypercholesteremia    Hypothyroidism    Attention deficit hyperactivity disorder (ADHD), predominantly inattentive type    Low testosterone in male    Primary hyperparathyroidism (HCC)    Anxiety and depression    Coronary artery calcification    Statin intolerance      Past Surgical History:   Procedure Laterality Date    Other surgical history      Rotator cuff repair Right 01/2023      Family History   Problem Relation Age of Onset    Heart Disorder Father         Cardiac bypass    No Known Problems Mother       Social History     Socioeconomic History    Marital status:    Tobacco Use    Smoking status: Never    Smokeless tobacco: Never   Vaping Use    Vaping status: Never Used   Substance and Sexual Activity    Alcohol use: Yes     Alcohol/week: 2.0 standard drinks of alcohol     Types: 2 Standard drinks or equivalent per week    Drug use: Never   Other Topics Concern    Caffeine Concern Yes     Comment: coffee- 3  cups a day,Sugar free Redbull - couple a week, Black tea Once a day     Social Drivers of Health     Financial Resource Strain: Low Risk  (10/30/2024)    Received from [facility]    Overall Financial Resource Strain (CARDIA)     Difficulty of Paying Living Expenses: Not hard at all   Food Insecurity: No Food Insecurity (10/30/2024)    Received from [facility]    Hunger Vital Sign     Worried About Running Out of Food in the Last Year: Never true     Ran Out of Food in the Last Year: Never true   Transportation Needs: Unknown (10/30/2024)    Received from [facility]    PRAPARE - Transportation     Lack of Transportation (Non-Medical): No   Housing Stability: Unknown (10/30/2024)    Received from [facility]    Housing Stability Vital Sign     Unable to Pay for Housing in the Last Year: No     Homeless in the Last Year: No         REVIEW OF SYSTEMS:   GENERAL: no fever or chills, post-operative pain overall controlled. Minimal SOB.     EXAM:   BP (!) 140/98 (BP Location: Left arm, Patient Position: Sitting, Cuff Size: large)   Pulse 99   Temp 97 °F (36.1 °C) (Temporal)   Wt 254 lb (115.2 kg)   SpO2 95%   BMI 36.77 kg/m²   GENERAL: Well developed, well nourished,in no apparent distress  SKIN: Surgical incision sites healing well.  NECK: supple  LUNGS: clear to auscultation  CARDIO: RRR without murmur      ASSESSMENT AND PLAN:   Hany Fontanez is a 54 year old male who presents for follow-up    Essential hypertension  His blood pressure has been labile since discharge and persistently elevated despite increasing his losartan to 50 mg daily.  We will add hydrochlorothiazide and he will continue to monitor his blood pressure at home.  He will notify me if his readings are not improving and follow-up in 4 weeks.   - hydroCHLOROthiazide 12.5 MG Oral Cap; Take 1 capsule (12.5 mg total) by mouth daily.  Dispense: 90  capsule; Refill: 0    Primary hyperparathyroidism (HCC)  Ectopic parathyroid adenoma  Subcutaneous emphysema after procedure  Hoarseness of voice  Atelectasis   Overall, he did well after his robotic assisted thoracoscopy and parathyroid adenoma resection.  He does have some postop subcutaneous emphysema that is improving and hoarseness.  His incisions are healing well and his pain is overall controlled.  He has upcoming follow-up with his thoracic surgeon.  All questions were answered and the patient agrees with the plan.     Thank you,  Lamin Persaud MD         [1] No Known Allergies

## 2024-11-12 DIAGNOSIS — E03.9 HYPOTHYROIDISM, UNSPECIFIED TYPE: ICD-10-CM

## 2024-11-12 DIAGNOSIS — E55.9 VITAMIN D DEFICIENCY: Primary | ICD-10-CM

## 2024-11-12 DIAGNOSIS — E21.0 PRIMARY HYPERPARATHYROIDISM (HCC): ICD-10-CM

## 2024-11-12 NOTE — TELEPHONE ENCOUNTER
Patient now taking Synthroid 25 mcg. Mychart message sent asking for confirmation.    Will awaiting response.

## 2024-11-13 ENCOUNTER — LAB ENCOUNTER (OUTPATIENT)
Dept: LAB | Age: 54
End: 2024-11-13
Attending: STUDENT IN AN ORGANIZED HEALTH CARE EDUCATION/TRAINING PROGRAM
Payer: COMMERCIAL

## 2024-11-13 DIAGNOSIS — E83.52 HYPERCALCEMIA: ICD-10-CM

## 2024-11-13 DIAGNOSIS — E03.9 HYPOTHYROIDISM, UNSPECIFIED TYPE: ICD-10-CM

## 2024-11-13 LAB
ALBUMIN SERPL-MCNC: 4.5 G/DL (ref 3.2–4.8)
ALBUMIN/GLOB SERPL: 1.7 {RATIO} (ref 1–2)
ALP LIVER SERPL-CCNC: 84 U/L
ALT SERPL-CCNC: 33 U/L
ANION GAP SERPL CALC-SCNC: 7 MMOL/L (ref 0–18)
AST SERPL-CCNC: 29 U/L (ref ?–34)
BILIRUB SERPL-MCNC: 0.6 MG/DL (ref 0.3–1.2)
BUN BLD-MCNC: 16 MG/DL (ref 9–23)
CALCIUM BLD-MCNC: 9.6 MG/DL (ref 8.7–10.4)
CHLORIDE SERPL-SCNC: 101 MMOL/L (ref 98–112)
CO2 SERPL-SCNC: 30 MMOL/L (ref 21–32)
CREAT BLD-MCNC: 1.24 MG/DL
EGFRCR SERPLBLD CKD-EPI 2021: 70 ML/MIN/1.73M2 (ref 60–?)
FASTING STATUS PATIENT QL REPORTED: NO
GLOBULIN PLAS-MCNC: 2.7 G/DL (ref 2–3.5)
GLUCOSE BLD-MCNC: 79 MG/DL (ref 70–99)
OSMOLALITY SERPL CALC.SUM OF ELEC: 286 MOSM/KG (ref 275–295)
POTASSIUM SERPL-SCNC: 3.9 MMOL/L (ref 3.5–5.1)
PROT SERPL-MCNC: 7.2 G/DL (ref 5.7–8.2)
PTH-INTACT SERPL-MCNC: 63.4 PG/ML (ref 18.5–88)
SODIUM SERPL-SCNC: 138 MMOL/L (ref 136–145)
T3 SERPL-MCNC: 1.95 NG/ML (ref 0.6–1.81)
T4 FREE SERPL-MCNC: 1.2 NG/DL (ref 0.8–1.7)
TSI SER-ACNC: 2.74 UIU/ML (ref 0.55–4.78)
VIT D+METAB SERPL-MCNC: 30.4 NG/ML (ref 30–100)

## 2024-11-13 PROCEDURE — 84439 ASSAY OF FREE THYROXINE: CPT

## 2024-11-13 PROCEDURE — 84443 ASSAY THYROID STIM HORMONE: CPT

## 2024-11-13 PROCEDURE — 36415 COLL VENOUS BLD VENIPUNCTURE: CPT

## 2024-11-13 PROCEDURE — 83970 ASSAY OF PARATHORMONE: CPT

## 2024-11-13 PROCEDURE — 82330 ASSAY OF CALCIUM: CPT

## 2024-11-13 PROCEDURE — 80053 COMPREHEN METABOLIC PANEL: CPT

## 2024-11-13 PROCEDURE — 84480 ASSAY TRIIODOTHYRONINE (T3): CPT

## 2024-11-13 PROCEDURE — 82306 VITAMIN D 25 HYDROXY: CPT

## 2024-11-14 LAB — LC CALCIUM, IONIZED: 4.9 MG/DL

## 2024-11-14 RX ORDER — LEVOTHYROXINE SODIUM 25 MCG
25 TABLET ORAL
Qty: 90 TABLET | Refills: 1 | Status: SHIPPED | OUTPATIENT
Start: 2024-11-14

## 2024-11-14 NOTE — TELEPHONE ENCOUNTER
Prescription sent to pharmacy.    Labs ordered.    Mychart sent to patient for review. Patient to reach out with further questions.    Closing Encounter.

## 2024-11-14 NOTE — TELEPHONE ENCOUNTER
Endocrine refill protocol for medications for hypothyroidism and hyperthyroidism    Protocol Criteria:  PASSED Reason: N/A    If all below requirements are met, send a 90-day supply with 1 refill per provider protocol.    Verify appointment with Endocrinology completed in the last 12 months or scheduled in the next 6 months.    Normal TSH result in the past 12 months   Review recent telephone encounters and mychart communications with patient to ensure a dose change has not occurred since last office visit that was not updated in the medication history list     Last completed office visit:8/2/2024 Geraldine Kaba DO   Next scheduled Follow up:   Future Appointments   Date Time Provider Department Center   12/6/2024  7:30 AM Lamin Persaud MD [facility]   2/7/2025  8:30 AM Geraldine Kaba DO [id number] [place]      Last TSH result:   TSH   Date Value Ref Range Status   11/13/2024 2.737 0.550 - 4.780 uIU/mL Final   06/18/2021 3.330 mIU/mL Final

## 2024-11-14 NOTE — TELEPHONE ENCOUNTER
Sent the 25mcg of Synthroid as a 25mcg strength tab.    For his lab results: Calcium levels are improved after parathyroid surgery and thyroid levels are overall under control, though T3 remains slightly elevated. This is likely due to continued [place] Thyroid use. Recommend repeating PTH, CMP, vitamin D, TSH/FT4/total T3 before Feb appt. Thanks!

## 2024-11-25 LAB — T4 FREE DIALYSIS/MS: 1 NG/DL

## 2024-11-26 ENCOUNTER — TELEPHONE (OUTPATIENT)
Dept: INTERNAL MEDICINE CLINIC | Facility: CLINIC | Age: 54
End: 2024-11-26

## 2024-11-26 NOTE — TELEPHONE ENCOUNTER
Called and spoke to pt. Relayed recommendation to increase hydrochlorothiazide to 25mg daily. He verbalized understanding and is agreeable to plan.

## 2024-11-26 NOTE — TELEPHONE ENCOUNTER
Received call from pt with update on BP. Patient stated since he had surgery almost 3 weeks ago, BP has been sporadic. Patient provided some readings below:    11/24:  144/104 HR 93  138/94   149/100 HR 79    11/25:      Morning  130/91 HR 91   132/96 HR 89         Afternoon  144/89     133/100    155/107 HR 83    11/26:   This morning 127/98 HR 95  30 mins ago 136/103       Patient wanting to update Dr. Persaud and ask if there is any changes he would like to make or if there is anything he would like pt to ask surgeon (he is seeing him for follow-up tomorrow)? Patient is taking losartan 50 mg and hydrochlorothiazide 12.5 mg.       Routing to Dr. Persaud to update. Anything in particular you would like him to discuss with surgeon? Pt stated he noticed change in BP after surgery. Follow-up appointment to further address BP?

## 2024-11-26 NOTE — TELEPHONE ENCOUNTER
BP labile and elevated. Increase hydrochlorothiazide to 25 mg daily (2 tablets) and continue monitoring, keep follow-up in office next week as scheduled.

## 2024-12-06 ENCOUNTER — OFFICE VISIT (OUTPATIENT)
Dept: INTERNAL MEDICINE CLINIC | Facility: CLINIC | Age: 54
End: 2024-12-06
Payer: COMMERCIAL

## 2024-12-06 VITALS
BODY MASS INDEX: 37 KG/M2 | DIASTOLIC BLOOD PRESSURE: 88 MMHG | HEART RATE: 95 BPM | OXYGEN SATURATION: 95 % | WEIGHT: 252.19 LBS | SYSTOLIC BLOOD PRESSURE: 132 MMHG

## 2024-12-06 DIAGNOSIS — I10 ESSENTIAL HYPERTENSION: Primary | ICD-10-CM

## 2024-12-06 DIAGNOSIS — R49.0 HOARSENESS OF VOICE: ICD-10-CM

## 2024-12-06 PROCEDURE — 99214 OFFICE O/P EST MOD 30 MIN: CPT | Performed by: FAMILY MEDICINE

## 2024-12-06 NOTE — PROGRESS NOTES
Hany Fontanez  6/19/1970    Chief Complaint   Patient presents with    Follow - Up     Room 2, \[facility]\"", follow up for BP, after surgery.       HPI:   Hany Fontanez is a 54 year old male who presents for follow-up. His BP has been controlled at home off hydrochlorothiazide and since stopping Celebrex. Has been consistent with his Losartan at 50 mg. He has had persistent hoarseness in his voice likely 2/2 RLN traction injury. He did reach out to his ENT about possible botox injections but he doesn't do them.     Current Outpatient Medications   Medication Sig Dispense Refill    SYNTHROID 25 MCG Oral Tab Take 1 tablet (25 mcg total) by mouth before breakfast. 90 tablet 1    hydroCHLOROthiazide 12.5 MG Oral Cap Take 1 capsule (12.5 mg total) by mouth daily. 90 capsule 0    pregabalin 50 MG Oral Cap Take 1 capsule (50 mg total) by mouth 2 (two) times daily.      celecoxib 100 MG Oral Cap Take 1 capsule (100 mg total) by mouth 2 (two) times daily with meals.      oxyCODONE 5 MG Oral Tab Take 1 tablet (5 mg total) by mouth every 4 (four) hours as needed for Pain.      losartan 25 MG Oral Tab Take 1 tablet (25 mg total) by mouth daily. 90 tablet 3    ARMOUR THYROID 15 MG Oral Tab Take 1 tablet (15 mg total) by mouth daily. + 1 tablet (60 mg) by mouth daily (75 mg total daily dose) 90 tablet 1    ARMOUR THYROID 60 MG Oral Tab Take 1 tablet (60 mg total) by mouth daily. + 1 tablet (15 mg) by mouth daily (75 mg total daily dose) 90 tablet 1    testosterone cypionate 200 mg/mL Intramuscular Solution Inject 0.5 mL (100 mg total) into the muscle twice a week. 26 mL 1    ezetimibe 10 MG Oral Tab Take 1 tablet (10 mg total) by mouth nightly. 90 tablet 1    Needle, Disp, (BD DISP NEEDLE) 25G X 1\" Does not apply Misc USE AS DIRECTED TWICE WEEKLY 100 each 0    methylphenidate ER 20 MG Oral Capsule SR 24 Hr Take 1 capsule (20 mg total) by mouth every morning.      methylphenidate 5 MG Oral Tab Take 1-2 tablets (5-10 mg  total) by mouth daily as needed. IN THE AFTERNOON      BD SYRINGE SLIP TIP 1 ML Does not apply Misc USE FOR TESTOSTERONE TWICE WEEKLY.      Carboxymeth-Cellulose-CitricAc (PLENITY) Oral Cap Take 3 capsules by mouth 2 (two) times daily before meals. 180 capsule 1    Cholecalciferol (VITAMIN D3) 25 MCG (1000 UT) Oral Cap Take 2 tablets by mouth daily. 5,000 also has k-2        Allergies[1]   Past Medical History:    Attention deficit hyperactivity disorder (ADHD)    COVID-19    fatigue    Disorder of thyroid    High cholesterol    Hyperlipidemia    Low testosterone    Thyroid disease    Visual impairment    glasses      Patient Active Problem List   Diagnosis    Hypercholesteremia    Hypothyroidism    Attention deficit hyperactivity disorder (ADHD), predominantly inattentive type    Low testosterone in male    Primary hyperparathyroidism (HCC)    Anxiety and depression    Coronary artery calcification    Statin intolerance      Past Surgical History:   Procedure Laterality Date    Other surgical history      Rotator cuff repair Right 01/2023      Family History   Problem Relation Age of Onset    Heart Disorder Father         Cardiac bypass    No Known Problems Mother       Social History     Socioeconomic History    Marital status:    Tobacco Use    Smoking status: Never    Smokeless tobacco: Never   Vaping Use    Vaping status: Never Used   Substance and Sexual Activity    Alcohol use: Yes     Alcohol/week: 2.0 standard drinks of alcohol     Types: 2 Standard drinks or equivalent per week    Drug use: Never   Other Topics Concern    Caffeine Concern Yes     Comment: coffee- 3 cups a day,Sugar free Redbull - couple a week, Black tea Once a day     Social Drivers of Health     Financial Resource Strain: Low Risk  (10/30/2024)    [facility]    Overall Financial Resource Strain (CARDIA)     Difficulty of Paying Living Expenses: Not hard at all   Food Insecurity: No Food  Insecurity (10/30/2024)    Received from [facility]    Hunger Vital Sign     Worried About Running Out of Food in the Last Year: Never true     Ran Out of Food in the Last Year: Never true   Transportation Needs: Unknown (10/30/2024)    Received from [facility]    PRAPARE - Transportation     Lack of Transportation (Non-Medical): No   Housing Stability: Unknown (10/30/2024)    Received from [facility]    Housing Stability Vital Sign     Unable to Pay for Housing in the Last Year: No     Homeless in the Last Year: No         REVIEW OF SYSTEMS:   GENERAL: feels well otherwise, no recent illness.     EXAM:   /88   Pulse 95   Wt 252 lb 3.2 oz (114.4 kg)   SpO2 95%   BMI 36.51 kg/m²   GENERAL: Well developed, well nourished,in no apparent distress  SKIN: No rash  EYES: PERRLA, EOMI, conjunctiva are clear  HEENT: atraumatic, normocephalic  LUNGS: clear to auscultation  CARDIO: RRR without murmur    ASSESSMENT AND PLAN:   Hany Fontanez is a 54 year old male who presents for follow-up    Essential hypertension  Borderline control. Will continue losartan 50 mg daily. Hydrochlorothiazide stopped due to increased urinary frequency. Recently stopped Celebrex which has helped his BP. Will continue current treatment for now and he will update me in 6 weeks with BP and follow-up in office in 3 months.     Hoarseness of voice  Likely RLR injury that will likely recover. Will attempt in finding an ENT who may be able to perform therapeutic injections.       All questions were answered and the patient agrees with the plan.     Thank you,  Lamin Persaud MD         [1] No Known Allergies

## 2024-12-30 DIAGNOSIS — E03.9 HYPOTHYROIDISM, UNSPECIFIED TYPE: ICD-10-CM

## 2024-12-31 NOTE — TELEPHONE ENCOUNTER
Endocrine refill protocol for medications for hypothyroidism and hyperthyroidism    Protocol Criteria:  PASSED Reason: N/A    If all below requirements are met, send a 90-day supply with 1 refill per provider protocol.    Verify appointment with Endocrinology completed in the last 12 months or scheduled in the next 6 months.    Normal TSH result in the past 12 months   Review recent telephone encounters and mychart communications with patient to ensure a dose change has not occurred since last office visit that was not updated in the medication history list     Last completed office visit:8/2/2024 Geraldine Kaba DO   Next scheduled Follow up:   Future Appointments   Date Time Provider Department Center   2/7/2025  8:30 AM Geraldine Kaba DO [id number] [place]   3/7/2025  8:00 AM Lamin Persaud MD [facility]      Last TSH result:   TSH   Date Value Ref Range Status   11/13/2024 2.737 0.550 - 4.780 uIU/mL Final   06/18/2021 3.330 mIU/mL Final

## 2025-01-01 RX ORDER — THYROID,PORK 60 MG
60 TABLET ORAL DAILY
Qty: 90 TABLET | Refills: 1 | Status: SHIPPED | OUTPATIENT
Start: 2025-01-01

## 2025-01-03 ENCOUNTER — TELEPHONE (OUTPATIENT)
Dept: INTERNAL MEDICINE CLINIC | Facility: CLINIC | Age: 55
End: 2025-01-03

## 2025-01-03 DIAGNOSIS — J38.00 RECURRENT LARYNGEAL NERVE PARALYSIS: ICD-10-CM

## 2025-01-03 DIAGNOSIS — R49.0 HOARSENESS OF VOICE: Primary | ICD-10-CM

## 2025-01-03 NOTE — TELEPHONE ENCOUNTER
Pt calling to ask if Dr. Persaud knows a name of a Laryngologist whom performs vocal cord Botox injections.  He has seen Dr. Rojas prior and was informed he does not do these.  Pt spoke with surgeon Tuesday whom recommended injections sooner than 3-6m as previously recommended by ENT.    Pt states his difficulty swallowing/hoarseness has remained status quo since parathyroid surgery in October.  Denies acute choking/difficulty breathing.      Dr. Persaud: please advise if you know of provider whom does perform vocal cord injections.        LOV 12/6/24    Hoarseness of voice  Likely RLR injury that will likely recover. Will attempt in finding an ENT who may be able to perform therapeutic injections.

## 2025-01-03 NOTE — TELEPHONE ENCOUNTER
Patient was seen on 12/6/24 calling to see if Dr Lamin Persaud was able to find an ENT who may be able to perform therapeutic injections? He stated he sent a message to his ENT and was told he doesn't do that.

## 2025-01-06 NOTE — TELEPHONE ENCOUNTER
Called patient and informed of referral   Offered to send information in a [employer] message, patient agrees  [facility] sent

## 2025-01-07 ENCOUNTER — MED REC SCAN ONLY (OUTPATIENT)
Facility: CLINIC | Age: 55
End: 2025-01-07

## 2025-01-31 ENCOUNTER — LAB ENCOUNTER (OUTPATIENT)
Dept: LAB | Age: 55
End: 2025-01-31
Attending: STUDENT IN AN ORGANIZED HEALTH CARE EDUCATION/TRAINING PROGRAM
Payer: COMMERCIAL

## 2025-01-31 DIAGNOSIS — E21.0 PRIMARY HYPERPARATHYROIDISM (HCC): ICD-10-CM

## 2025-01-31 DIAGNOSIS — E55.9 VITAMIN D DEFICIENCY: ICD-10-CM

## 2025-01-31 DIAGNOSIS — E03.9 HYPOTHYROIDISM, UNSPECIFIED TYPE: ICD-10-CM

## 2025-01-31 LAB
ALBUMIN SERPL-MCNC: 4.7 G/DL (ref 3.2–4.8)
ALBUMIN/GLOB SERPL: 1.9 {RATIO} (ref 1–2)
ALP LIVER SERPL-CCNC: 70 U/L
ALT SERPL-CCNC: 25 U/L
ANION GAP SERPL CALC-SCNC: 12 MMOL/L (ref 0–18)
AST SERPL-CCNC: 28 U/L (ref ?–34)
BILIRUB SERPL-MCNC: 1 MG/DL (ref 0.3–1.2)
BUN BLD-MCNC: 14 MG/DL (ref 9–23)
CALCIUM BLD-MCNC: 9.5 MG/DL (ref 8.7–10.6)
CHLORIDE SERPL-SCNC: 103 MMOL/L (ref 98–112)
CO2 SERPL-SCNC: 27 MMOL/L (ref 21–32)
CREAT BLD-MCNC: 1.31 MG/DL
EGFRCR SERPLBLD CKD-EPI 2021: 65 ML/MIN/1.73M2 (ref 60–?)
FASTING STATUS PATIENT QL REPORTED: YES
GLOBULIN PLAS-MCNC: 2.5 G/DL (ref 2–3.5)
GLUCOSE BLD-MCNC: 80 MG/DL (ref 70–99)
OSMOLALITY SERPL CALC.SUM OF ELEC: 293 MOSM/KG (ref 275–295)
POTASSIUM SERPL-SCNC: 4.4 MMOL/L (ref 3.5–5.1)
PROT SERPL-MCNC: 7.2 G/DL (ref 5.7–8.2)
PTH-INTACT SERPL-MCNC: 43.8 PG/ML (ref 18.5–88)
SODIUM SERPL-SCNC: 142 MMOL/L (ref 136–145)
T3 SERPL-MCNC: 1.02 NG/ML (ref 0.6–1.81)
T4 FREE SERPL-MCNC: 1 NG/DL (ref 0.8–1.7)
TSI SER-ACNC: 2.12 UIU/ML (ref 0.55–4.78)
VIT D+METAB SERPL-MCNC: 35.3 NG/ML (ref 30–100)

## 2025-01-31 PROCEDURE — 80053 COMPREHEN METABOLIC PANEL: CPT

## 2025-01-31 PROCEDURE — 84480 ASSAY TRIIODOTHYRONINE (T3): CPT

## 2025-01-31 PROCEDURE — 82306 VITAMIN D 25 HYDROXY: CPT

## 2025-01-31 PROCEDURE — 84443 ASSAY THYROID STIM HORMONE: CPT

## 2025-01-31 PROCEDURE — 36415 COLL VENOUS BLD VENIPUNCTURE: CPT

## 2025-01-31 PROCEDURE — 84439 ASSAY OF FREE THYROXINE: CPT

## 2025-01-31 PROCEDURE — 83970 ASSAY OF PARATHORMONE: CPT

## 2025-02-07 ENCOUNTER — OFFICE VISIT (OUTPATIENT)
Facility: CLINIC | Age: 55
End: 2025-02-07
Payer: COMMERCIAL

## 2025-02-07 VITALS
BODY MASS INDEX: 34.13 KG/M2 | OXYGEN SATURATION: 95 % | HEART RATE: 93 BPM | HEIGHT: 72 IN | WEIGHT: 252 LBS | DIASTOLIC BLOOD PRESSURE: 78 MMHG | RESPIRATION RATE: 18 BRPM | SYSTOLIC BLOOD PRESSURE: 130 MMHG

## 2025-02-07 DIAGNOSIS — E03.9 HYPOTHYROIDISM, UNSPECIFIED TYPE: Primary | ICD-10-CM

## 2025-02-07 DIAGNOSIS — R79.89 LOW TESTOSTERONE IN MALE: ICD-10-CM

## 2025-02-07 DIAGNOSIS — E21.0 PRIMARY HYPERPARATHYROIDISM (HCC): ICD-10-CM

## 2025-02-07 DIAGNOSIS — Z79.890 LONG-TERM CURRENT USE OF TESTOSTERONE REPLACEMENT THERAPY: ICD-10-CM

## 2025-02-07 PROCEDURE — 99215 OFFICE O/P EST HI 40 MIN: CPT | Performed by: STUDENT IN AN ORGANIZED HEALTH CARE EDUCATION/TRAINING PROGRAM

## 2025-02-07 RX ORDER — THYROID,PORK 15 MG
15 TABLET ORAL DAILY
Qty: 90 TABLET | Refills: 1 | Status: SHIPPED | OUTPATIENT
Start: 2025-02-07

## 2025-02-07 RX ORDER — LEVOTHYROXINE SODIUM 25 UG/1
25 TABLET ORAL
Qty: 90 TABLET | Refills: 1 | Status: SHIPPED | OUTPATIENT
Start: 2025-02-07

## 2025-02-07 RX ORDER — THYROID,PORK 60 MG
60 TABLET ORAL DAILY
Qty: 90 TABLET | Refills: 1 | Status: SHIPPED | OUTPATIENT
Start: 2025-02-07

## 2025-02-07 RX ORDER — TESTOSTERONE CYPIONATE 200 MG/ML
100 INJECTION, SOLUTION INTRAMUSCULAR
Qty: 26 ML | Refills: 1 | Status: SHIPPED | OUTPATIENT
Start: 2025-02-07

## 2025-02-07 NOTE — PROGRESS NOTES
Return Office Visit      Today's date: 2/7/2025      HISTORY OF PRESENT ILLNESS:  Hany Fontanez is a 54 year old male who presents for follow up for hypothyroidism, hypogonadism and primary hyperparathyroidism.    Initial consult April 2022:  HyperCa hx:  Patient states that he has been aware of mildly elevated calcium for the past few years, with recent elevated PTH brought to his attention.  Denies any thiazides, lithium, excess dairy dairy.  Is physically active with overall good energy.   Denies any history of fractures or stones.  Denies any familial hypercalcemia.  Is interested in surgery if it can definitely reverse the hyperPTH.     Reviewed labs:  .9  Ca 10.7-11.1 (2022)  Vit D n/a  Cr wnl  Urine Ca n/a  Hx of fx: no  Hx of stones: possibly had a small stone 'years ago'     Hypogonadism hx:  Started in [place] >10 years ago, seen a 'hormone doctor'. Previously very physically active and felt sx of hypogonadism including weight gain and fatigue. Started on gels, then switched to injections.  At one point his testosterone was >1000, which he was cautioned about. Knows all the risks associated with testosterone replacement. Gets phlebotomized frequently for polycythemia.      Currently takes IM testosterone 100mg twice a week   Also getting anastrozole 0.25mg twice a week (from his [place] doctor). Understands that I will not be prescribing it.  Most recent AM fasting testosterone 927.9, Hbg 17.5, hematocrit 51.1     Hypothyroid hx:  Been on [place] 1 grain daily, for >10 years now.   Feels better on this than levothyroxine.  Most recent TFTs wnl.    Interim hx:  April 2022 - urine Ca elevated at 558  May 2022 - DXA showed normal BMD  June 2022 - Sestamibi did not identify any parathyroid adenoma.; saw ENT  June 2022 - thyroid US showing no nodules (ordered by ENT Dr Eng); plan is to repeat PTH again  July labs show mildly low fT4. Vit D at goal. H&H elevated, pt gets phlebotomy. Testosterone  level still in process. Getting IM Testosterone 100mg twice a week.  Getting phlebotomized every couple of months at a lab in [place].  July 2022 - [place] at 3/4 grain, fT4 0.7, TSH 3.36.     Jan 2023 visit  8/2022 - repeat .9, Ca 11.1; pt will be following up with another ENT  1/2023 - total T 838, TSH 2.55, TT3 120, fT4 0.7, H&H 18.3/54.7; he states it used to be higher but he would do blood donations, hasn't done one recently, doesn't want to decrease dose of T  Currently on [place] 75mg daily; takes on its own in the morning without other medications  Currently IM Test 100mg twice a week  Just had R shoulder surgery, in sling    July 2023 visit  Feels well on current TRT; to prevent polycythemia, he is donating blood q6-8 weeks. States he'll need a doctor's note if he hypothetically  needs to donate more freq (eg q4 weeks)  Started on [place] with medical weight management clinic; lost some weight then plateaued  Feels well on current [place] dose as well  Still traveling a lot for work; stays physically active    Feb 2024 11/2023 - PTH 69, vit D 18, Ca 12.0 -- vit D repleted; recommended to discuss with ENT again given Ca rise  12/2023 - fT4 0.7, TT3 113, TSH 2.59, PSA 2.0, vit D 36 -- remains on [place] 90mg (new brand from pharmacy, trying to see how it goes before dose increase)  1/2024 -  total Testosterone 686, Estradiol 32, H&H 16.7/50.5   Has been traveling a lot for work, a lot more responsibilities at work  Has seen Dr. Rojas (ENT) office but still not able to localize (4D CT) parathyroid adenoma    Aug 2024  -Remains on [place] 90mg daily, not taking extra. Recent TSH was elevated and pt feels this may be a lab error as he has been on a stable dose for many years.   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 90 tablet 1    ARMOUR THYROID 60 MG Oral Tab Take 1 tablet (60 mg total) by mouth daily. + 1 tablet (15 mg) by mouth daily (75 mg total daily dose)  E03.9 90 tablet 1    ARMOUR THYROID 15 MG Oral Tab Take 1 tablet (15 mg total) by mouth daily. + 1 tablet (60 mg) by mouth daily (75 mg total daily dose) 90 tablet 1    testosterone cypionate 200 mg/mL Intramuscular Solution Inject 0.5 mL (100 mg total) into the muscle twice a week. 26 mL 1    hydroCHLOROthiazide 12.5 MG Oral Cap Take 1 capsule (12.5 mg total) by mouth daily. 90 capsule 0    pregabalin 50 MG Oral Cap Take 1 capsule (50 mg total) by mouth 2 (two) times daily.      celecoxib 100 MG Oral Cap Take 1 capsule (100 mg total) by mouth 2 (two) times daily with meals.      oxyCODONE 5 MG Oral Tab Take 1 tablet (5 mg total) by mouth every 4 (four) hours as needed for Pain.      losartan 25 MG Oral Tab Take 1 tablet (25 mg total) by mouth daily. 90 tablet 3    ezetimibe 10 MG Oral Tab Take 1 tablet (10 mg total) by mouth nightly. 90 tablet 1    Needle, Disp, (BD DISP NEEDLE) 25G X 1\" Does not apply Misc USE AS DIRECTED TWICE WEEKLY 100 each 0    methylphenidate ER 20 MG Oral Capsule SR 24 Hr Take 1 capsule (20 mg total) by mouth every morning.      methylphenidate 5 MG Oral Tab Take 1-2 tablets (5-10 mg total) by mouth daily as needed. IN THE AFTERNOON      BD  SYRINGE SLIP TIP 1 ML Does not apply Misc USE FOR TESTOSTERONE TWICE WEEKLY.      Carboxymeth-Cellulose-CitricAc (PLENITY) Oral Cap Take 3 capsules by mouth 2 (two) times daily before meals. 180 capsule 1    Cholecalciferol (VITAMIN D3) 25 MCG (1000 UT) Oral Cap Take 2 tablets by mouth daily. 5,000 also has k-2         Endocrine Medications            EUTHYROX 25 MCG Oral Tab    ARMOUR THYROID 60 MG Oral Tab    ARMOUR THYROID 15 MG Oral Tab            ALLERGY:  No Known Allergies      PAST MEDICAL, SOCIAL AND FAMILY HISTORY:  See past medical history marked as reviewed.  See past surgical history marked as reviewed.  See past family history marked as reviewed.  See past social history marked as reviewed.      REVIEW OF SYSTEMS:   Ten point review of systems has been performed and is otherwise negative and/or non-contributory, except as described above.      PHYSICAL EXAM:   Vitals:    02/07/25 0833   BP: 130/78   Pulse: 93   Resp: 18   SpO2: 95%   Weight: 252 lb (114.3 kg)   Height: 6' (1.829 m)         BMI: Body mass index is 34.18 kg/m².     CONSTITUTIONAL:  awake, alert, cooperative, no apparent distress, and appears stated age  PSYCH: normal affect  LUNGS: breathing comfortably  CARDIOVASCULAR:  regular rate       DATA:     US THYROID (CPT=76536)    Result Date: 6/9/2022  CONCLUSION:  Heterogeneous thyroid gland.       Dictated by (CST): Mitch Conway MD on 6/09/2022 at 4:08 PM     Finalized by (CST): Mitch Conway MD on 6/09/2022 at 4:09 PM       PROCEDURE:  NM PARATHYROID SPECT/CT (CPT=78072)       COMPARISON:  None.       INDICATIONS:  E21.0 Primary hyperparathyroidism (HCC)       TECHNIQUE:  The patient was administered 297 uCi I-123 by mouth, and the patient returned to the department 3 hours later. At that time, a total of 25.0 mCi Tc99m sestamibi was injected IV, and dual-photon acquisition was performed. A high resolution   large field-of-view series of images was then obtained of the neck and chest region  at 30 minutes and 3 hours post injection of the sestamibi. I-123 pinhole images were computer subtracted from the sestamibi pinhole images on a dedicated nuclear medicine    workstation. Additional dedicated SPECT images were taken with concurrent CT scan for both anatomical localization as well as attenuation correction. Scan was reformatted into multi-planar reconstructions on a dedicated workstation.         FINDINGS:   SPECT/CT Imaging:  No thyroid nodule or mass.       I-123:  Uniform activity within a normal-sized thyroid gland       SESTAMIBI:  Uniform activity within a normal-sized thyroid gland with no mismatched activity to suggest parathyroid adenoma.  Normal distribution within soft tissues, including salivary glands.       3 HOUR WASHOUT:  No evidence for ectopic sestamibi to suggest ectopic parathyroid adenoma.                Normal washout from thyroid gland after 3 hours.         Impression   CONCLUSION:  No evidence of parathyroid adenoma.       Dictated by (CST): Mitch Conway MD on 6/01/2022 at 2:22 PM       Finalized by (CST): Mitch Conway MD on 6/01/2022 at 2:24 PM        PROCEDURE:  XR DEXA BONE DENSITOMETRY (CPT=77080)       COMPARISON:  None.       INDICATIONS:    E21.0 Primary hyperparathyroidism (HCC)       PATIENT STATED HISTORY: (As transcribed by Technologist)  Hyperparathyoidism.            LUMBAR SPINE ANALYSIS RESULTS:       Bone mineral density (BMD) (g/cm2):  1.001     Lumbar T-Score:  -0.8       % young normals:  92       % age matched controls:  95       Change from prior spine examination:  n/a                TOTAL HIP ANALYSIS RESULTS:         Bone mineral density (BMD) (g/cm2):  1.053       Total Hip T-Score:  0.1       % young normals:  102       % age matched controls:  107       Change from prior hip examination:  n/a                FEMORAL NECK ANALYSIS RESULTS:         Bone mineral density (BMD) (g/cm2):  0.868       Femoral neck T-Score:  -0.5       % young normals:  93        % age matched controls:  105       Change from prior hip examination:  n/a       LEFT FOREARM ANALYSIS:   Bone mineral density:  0.892         T-score:  1.4     % young normal:  109   % age match controls:  112            ADDITIONAL FINDINGS:  The Z-scores in left femoral neck, left total hip, lumbar spine and left forearm are 0.3, 0.4, -0.5 and 1.9 respectively              Impression   CONCLUSION:  Bone mineral density is in the normal range.      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 Again reviewed risks of extremely high T levels.   - Recent levels at goal on 100mg twice weekly, refilled  - Repeat surveillance T labs  - Reviewed that anastrozole therapy is not advised and there is no clinical indication to prescribe this    #Hypothyroidism, unspecified type  Plan: clinically and biochemically euthyroid. He has tried LT4 before but feels consistently better on [place]. He's aware of the diff in ratio of T4:T3 in [place] compared to that of a human thyroid.   - Currently on [place] 75mg + Synthroid 25mcg with biochemical euthyroidism  - TFTs q6 months      Return to Clinic in 6 months      The above plan was discussed in detail with the patient who verbalized  understanding and agreement.      A total of 40 minutes was spent today on obtaining history, reviewing outside records, reviewing pertinent labs/imaging, reviewing relevant pathophysiology with patient, evaluating patient, providing multiple treatment options, communicating with patient's other providers as appropriate, and completing documentation and orders.      Geraldine Kaba DO  [facility] End[facility]  2/7/2025     Note to patient: The 21 Century Cures Act makes medical notes like these available to patients in the interest of transparency. However, be advised this is a medical document. It is intended as peer to peer communication. It is written in medical language and may contain abbreviations or verbiage that are unfamiliar. It may appear blunt or direct. Medical documents are intended to carry relevant information, facts as evident, and the clinical opinion of the practitioner.      In reviewing this note, please be advised that Dragon Voice Recognition software used to dictate the note may have made errors in recognizing some of the words or phrases.

## 2025-02-07 NOTE — PATIENT INSTRUCTIONS
Let's keep the same thyroid dosage, we'll switch Synthroid to Euthyroid (which is basically the same) since it will be cheaper.   We'll just keep an eye on the calcium labs.   Keep the same D3 and K.   We'll reorder the T bottles as well.   Do labs in 6 months, assisted between the injections.    General follow up information:  Please let us know if you require any refills at least 1 week prior to your medication running out. If you do run out of medication, please call our office ASAP to request refills (do not wait until your follow up).  Please call us if you experience any problems with insurance coverage of medication, lab work, or imaging.   Lab results and imaging will typically be reviewed at follow up appointments, or within 3-5 business days of ALL results being in if you do not have an appointment scheduled in the near future. Our office will contact you for any abnormal results requiring more urgent follow up or action.   The on-call pager is for urgent matters only. If you are a type 1 diabetic and run out of insulin after business hours 8AM-4PM, you may call the on-call pager for a refill to a 24 hour pharmacy. If you have adrenal insufficiency and run out of steroids, you may call the on-call pager for a refill to a 24 hour pharmacy. All other refill requests should be requested during business hours.    Return Visit   [] Dr. Kaba/Clarice in 6 months

## 2025-02-21 ENCOUNTER — APPOINTMENT (OUTPATIENT)
Dept: CT IMAGING | Age: 55
End: 2025-02-21
Attending: NURSE PRACTITIONER
Payer: COMMERCIAL

## 2025-02-21 ENCOUNTER — TELEPHONE (OUTPATIENT)
Dept: INTERNAL MEDICINE CLINIC | Facility: CLINIC | Age: 55
End: 2025-02-21

## 2025-02-21 ENCOUNTER — APPOINTMENT (OUTPATIENT)
Dept: GENERAL RADIOLOGY | Age: 55
End: 2025-02-21
Attending: NURSE PRACTITIONER
Payer: COMMERCIAL

## 2025-02-21 ENCOUNTER — TELEPHONE (OUTPATIENT)
Facility: CLINIC | Age: 55
End: 2025-02-21

## 2025-02-21 ENCOUNTER — HOSPITAL ENCOUNTER (OUTPATIENT)
Age: 55
Discharge: HOME | End: 2025-02-21
Payer: COMMERCIAL

## 2025-02-21 VITALS
RESPIRATION RATE: 16 BRPM | HEART RATE: 76 BPM | SYSTOLIC BLOOD PRESSURE: 138 MMHG | TEMPERATURE: 98 F | OXYGEN SATURATION: 98 % | DIASTOLIC BLOOD PRESSURE: 92 MMHG

## 2025-02-21 DIAGNOSIS — R07.89 LEFT-SIDED CHEST WALL PAIN: Primary | ICD-10-CM

## 2025-02-21 PROBLEM — D35.1: Status: ACTIVE | Noted: 2024-11-27

## 2025-02-21 PROBLEM — J98.59 MEDIASTINAL MASS: Status: ACTIVE | Noted: 2024-09-19

## 2025-02-21 LAB
#MXD IC: 0.6 X10ˆ3/UL (ref 0.1–1)
ATRIAL RATE: 81 BPM
BILIRUB UR QL STRIP: NEGATIVE
BUN BLD-MCNC: 17 MG/DL (ref 7–18)
CHLORIDE BLD-SCNC: 104 MMOL/L (ref 98–112)
CLARITY UR: CLEAR
CO2 BLD-SCNC: 26 MMOL/L (ref 21–32)
CREAT BLD-MCNC: 1.2 MG/DL
EGFRCR SERPLBLD CKD-EPI 2021: 73 ML/MIN/1.73M2 (ref 60–?)
GLUCOSE BLD-MCNC: 82 MG/DL (ref 70–99)
GLUCOSE UR STRIP-MCNC: NEGATIVE MG/DL
HCT VFR BLD AUTO: 49.9 %
HCT VFR BLD CALC: 50 %
HGB BLD-MCNC: 16.7 G/DL
HGB UR QL STRIP: NEGATIVE
ISTAT IONIZED CALCIUM FOR CHEM 8: 1.22 MMOL/L (ref 1.12–1.32)
KETONES UR STRIP-MCNC: NEGATIVE MG/DL
LEUKOCYTE ESTERASE UR QL STRIP: NEGATIVE
LYMPHOCYTES # BLD AUTO: 1.4 X10ˆ3/UL (ref 1–4)
LYMPHOCYTES NFR BLD AUTO: 19 %
MCH RBC QN AUTO: 30.8 PG (ref 26–34)
MCHC RBC AUTO-ENTMCNC: 33.5 G/DL (ref 31–37)
MCV RBC AUTO: 92.1 FL (ref 80–100)
MIXED CELL %: 7.8 %
NEUTROPHILS # BLD AUTO: 5.6 X10ˆ3/UL (ref 1.5–7.7)
NEUTROPHILS NFR BLD AUTO: 73.2 %
NITRITE UR QL STRIP: NEGATIVE
P AXIS: 22 DEGREES
P-R INTERVAL: 136 MS
PH UR STRIP: 6.5 [PH]
PLATELET # BLD AUTO: 242 X10ˆ3/UL (ref 150–450)
POTASSIUM BLD-SCNC: 4 MMOL/L (ref 3.6–5.1)
PROT UR STRIP-MCNC: 100 MG/DL
Q-T INTERVAL: 338 MS
QRS DURATION: 82 MS
QTC CALCULATION (BEZET): 392 MS
R AXIS: 20 DEGREES
RBC # BLD AUTO: 5.42 X10ˆ6/UL
SODIUM BLD-SCNC: 141 MMOL/L (ref 136–145)
SP GR UR STRIP: >=1.03
T AXIS: 7 DEGREES
TROPONIN I BLD-MCNC: <0.02 NG/ML
UROBILINOGEN UR STRIP-ACNC: <2 MG/DL
VENTRICULAR RATE: 81 BPM
WBC # BLD AUTO: 7.6 X10ˆ3/UL (ref 4–11)

## 2025-02-21 PROCEDURE — 99215 OFFICE O/P EST HI 40 MIN: CPT

## 2025-02-21 PROCEDURE — 85025 COMPLETE CBC W/AUTO DIFF WBC: CPT | Performed by: NURSE PRACTITIONER

## 2025-02-21 PROCEDURE — 99214 OFFICE O/P EST MOD 30 MIN: CPT

## 2025-02-21 PROCEDURE — 71101 X-RAY EXAM UNILAT RIBS/CHEST: CPT | Performed by: NURSE PRACTITIONER

## 2025-02-21 PROCEDURE — 93005 ELECTROCARDIOGRAM TRACING: CPT

## 2025-02-21 PROCEDURE — 71275 CT ANGIOGRAPHY CHEST: CPT | Performed by: NURSE PRACTITIONER

## 2025-02-21 PROCEDURE — 36415 COLL VENOUS BLD VENIPUNCTURE: CPT

## 2025-02-21 PROCEDURE — 93010 ELECTROCARDIOGRAM REPORT: CPT

## 2025-02-21 PROCEDURE — 80047 BASIC METABLC PNL IONIZED CA: CPT

## 2025-02-21 PROCEDURE — 81002 URINALYSIS NONAUTO W/O SCOPE: CPT

## 2025-02-21 PROCEDURE — 84484 ASSAY OF TROPONIN QUANT: CPT

## 2025-02-21 NOTE — ED PROVIDER NOTES
Patient had robotic resection of parathyroid adenoma in October.  He notes that ever since then, he has experienced some left lower chest/left upper quadrant and left lateral chest discomforts.  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  Patient notes that he has elevated left hemidiaphragm.  He has had difficulty with his speech and shortness of breath ever since his surgery.  Patient also reports getting quite a bit of reflux symptoms.  Patient reports while at a water park, patient coughed and felt some discomfort.  He gestures over the left anterior lateral lower chest wall as location of discomfort.  He states that he felt his reflux symptoms and coughed even harder and his symptoms became worse.  Ever since then, he has had persistent discomfort and points to the left lower anterior lateral chest wall.  No abdominal pain.  No nausea or vomiting.  No urinary symptoms such as burning or blood in the urine.  Patient denies any recent cold or cough symptoms.  Patient denies substernal chest pain or pressure.  He recalls having a stress test about a year ago that was normal and he was cleared for surgery by his cardiologist.    General: The patient is awake, alert, conversant.  Patient guards from moving as this reproduces the discomfort in his left chest  Eyes: sclera white.  Lids and lashes are normal.  With no JVD  Chest: Tender to palpation over the anterior lateral left chest wall without crepitus  Lungs: Clear to auscultation bilaterally.  No rhonchi or rales.  Heart: Normal S1 and S2, without murmur.  Distal pulses are strong and symmetric.  Abdomen: Soft, nondistended.  Minimally tender in the left upper quadrant definitely without guarding, rigidity, or rebound.  Skin: No rash in the area patient's discomfort  Neurologic:  Mental status as above.  Patient moves all extremities with good strength and coordination.      Patient has left anterior lateral chest wall  pain reproducible with movement, deep breathing, and palpation.  This suggests chest wall pain, even rib fracture.  No auscultated findings and pneumothorax.  Considering he has had recent surgery and recent travel, pulmonary embolism must be excluded.  He has not tachycardic nor tachypneic nor hypoxic however.  His symptoms are not typical of acute coronary syndrome and he has had ongoing pain for more than 24 hours.  Negative EKG and cardiac enzyme would be reassuring.    An EKG was performed. I agree with computerized EKG interval interpretations. EKG shows sinus rhythm. There are no acute ST changes to suggest acute ischemia or infarct  Ventricular rate 81 bpm. CT interval 136 ms.  82 ms QRS duration.    Troponin negative  I-STAT unremarkable  Hemogram unremarkable  Urine dip with negative blood    Chest x-ray with ribs  CONCLUSION:  No evidence of acute displaced left rib fracture.  Minimal atelectasis/scarring in the lower lungs.         CTA chest  CONCLUSION:    No evidence of pulmonary embolus.  Elevated left hemidiaphragm and atelectasis in the left lower lobe.         Results of the workup reviewed  I agree with plan for supportive measures and encourage close follow-up with her primary care provider    I provided a substantive portion of care for this patient. I personally performed the medical decision making for this encounter.

## 2025-02-21 NOTE — DISCHARGE INSTRUCTIONS
We recommend the following for your condition:    Ibuprofen as directed; take with food.     Avoid activities that may cause more pain to the area for now. As your pain decreases, begin movements slowly.    May try Ice or heat to area. Do not fall asleep with heating pad on.     Follow up with surgeon as scheduled next week.      Go the the Emergency room if you develop:     Trouble breathing or sudden shortness of breath   Fever   Dizziness   Chest pain   Productive or bloody cough  Urinating blood

## 2025-02-21 NOTE — ED PROVIDER NOTES
Patient Seen in: [facility]      History     Chief Complaint   Patient presents with    Abdominal Pain     Had recent surgery - Entered by patient     Stated Complaint: Abdominal Pain - Had recent surgery    Subjective:   54-year-old male presents with complaint of left side chest wall pain that radiates to left lateral chest wall that worsened 1 day ago. Pt states he had a hard cough when symptoms began. Pt endorses that since his left parathyroid adenoma resection 3 mos ago, he has had some anterior, lower left chest wall pain, shortness of breath and GERD. States his thoracic surgeon is aware of this.   Recently went to a water park and was active up and down the stairs.   No recent Covid or Influenza illness.   No trauma or falls.     Pt denies fever, chills, dizziness, diaphoresis, peripheral edema, hemoptysis, abdominal pain, nausea, vomiting, diarrhea, frequency, dysuria.         The history is provided by the patient.         Objective:     Past Medical History:    Attention deficit hyperactivity disorder (ADHD)    COVID-19    fatigue    Disorder of thyroid    High cholesterol    Hyperlipidemia    Low testosterone    Thyroid disease    Visual impairment    glasses              Past Surgical History:   Procedure Laterality Date    Other surgical history      Parathyroidectomy      Rotator cuff repair Right 01/2023                Social History     Socioeconomic History    Marital status:    Tobacco Use    Smoking status: Never    Smokeless tobacco: Never   Vaping Use    Vaping status: Never Used   Substance and Sexual Activity    Alcohol use: Yes     Alcohol/week: 2.0 standard drinks of alcohol     Types: 2 Standard drinks or equivalent per week    Drug use: Never   Other Topics Concern    Caffeine Concern Yes     Comment: coffee- 3 cups a day,Sugar free Redbull - couple a week, Black tea Once a day     Social Drivers of Health     Food Insecurity: No Food Insecurity (10/30/2024)     Received from [facility]    Hunger Vital Sign     Worried About Running Out of Food in the Last Year: Never true     Ran Out of Food in the Last Year: Never true   Transportation Needs: Unknown (10/30/2024)    Received from [facility]    PRAPARE - Transportation     Lack of Transportation (Non-Medical): No   Housing Stability: Unknown (10/30/2024)    Received from [facility]    Housing Stability Vital Sign     Unable to Pay for Housing in the Last Year: No     Homeless in the Last Year: No              Review of Systems   Constitutional:  Negative for chills, diaphoresis and fever.   HENT:  Negative for congestion and trouble swallowing.    Respiratory:  Positive for cough and shortness of breath (since surgery in Oct). Negative for chest tightness and wheezing.    Cardiovascular:  Negative for palpitations.   Gastrointestinal:  Negative for abdominal pain, blood in stool, diarrhea, nausea and vomiting.   Genitourinary:  Negative for dysuria and hematuria.       Positive for stated complaint: Abdominal Pain - Had recent surgery  Other systems are as noted in HPI.  Constitutional and vital signs reviewed.      All other systems reviewed and negative except as noted above.    Physical Exam     ED Triage Vitals [02/21/25 0951]   /80   Pulse 86   Resp 18   Temp 98 °F (36.7 °C)   Temp src Oral   SpO2 98 %   O2 Device None (Room air)       Current Vitals:   Vital Signs  BP: (!) 138/92  Pulse: 76  Resp: 16  Temp: 98 °F (36.7 °C)  Temp src: Oral    Oxygen Therapy  SpO2: 98 %  O2 Device: None (Room air)        Physical Exam  Vitals and nursing note reviewed.   Constitutional:       General: He is not in acute distress.     Appearance: He is well-developed. He is not ill-appearing, toxic-appearing or diaphoretic.   HENT:      Head: Normocephalic.      Mouth/Throat:      Mouth: Mucous membranes are moist.   Eyes:      General: No scleral  icterus.  Cardiovascular:      Rate and Rhythm: Normal rate and regular rhythm.      Heart sounds: No murmur heard.  Pulmonary:      Effort: Pulmonary effort is normal. No respiratory distress.      Breath sounds: Normal breath sounds. No stridor. No wheezing, rhonchi or rales.   Chest:      Chest wall: Tenderness (left lateral chest, no ecchymosis or erythema) present. No crepitus.   Abdominal:      General: Bowel sounds are normal.      Palpations: Abdomen is soft.      Tenderness: There is abdominal tenderness (mild) in the left upper quadrant.   Skin:     General: Skin is warm and dry.      Findings: No rash.   Neurological:      General: No focal deficit present.      Mental Status: He is alert.   Psychiatric:         Mood and Affect: Mood normal.             ED Course     Labs Reviewed   [facility] POCT URINALYSIS DIPSTICK - Abnormal; Notable for the following components:       Result Value    Protein urine 100 (*)     All other components within normal limits   POCT ISTAT CHEM8 CARTRIDGE - Normal   ISTAT TROPONIN - Normal   POCT CBC     EKG    Rate, intervals and axes as noted on EKG Report.  Rate: 81  Rhythm: Sinus Rhythm  Reading: SR, rate 81, no ST/T wave changes, compared to 11/2024              [facility]      Medical Decision Making  54-year-old male presents with complaint of left, anterior chest wall pain that radiates to left lateral chest wall that worsened 1 day ago after coughing. Pt with hx of mediastinal mass and had robotic excision of intrathoracic parathyroid adenoma on 10/30/24.   Diff dx include rib fracture, PE, ACS, nephrolithiasis, less likely pancreatitis.    On exam, pt is well appearing, lungs clear- diminished left lower.   EKG reveals NSR. No ST changes. Troponin negative.   Other labs reviewed, urine without hematuria.   Xray ribs does not reveal fracture.   CTA- no PE.   Recommend to continue previously prescribed medications; pt declines Norco for pain, prefers to avoid narcotics. Other  supportive care discussed.   Follow up with thoracic surgery as scheduled next week.   If any worsening symptoms, or new/concerning symptoms go directly to the ED.   Pt agreeable to plan.     Case discussed with Dr. Jaciel Kirkpatrick: ED physician     Amount and/or Complexity of Data Reviewed  External Data Reviewed: labs and notes.  Labs: ordered.  Radiology: ordered.  ECG/medicine tests: ordered.    Risk  OTC drugs.        Disposition and Plan     Clinical Impression:  1. Left-sided chest wall pain         Disposition:  Discharge  2/21/2025 11:56 am    Follow-up:  Lamin Persaud MD  [facility] SARAH VELÁSQUEZ  [address] [address]  [phone]      As needed    Grady Arriaga- thoracic surgery    On 2/27/2025  as scheduled          Medications Prescribed:  Discharge Medication List as of 2/21/2025 12:04 PM              Supplementary Documentation:

## 2025-02-21 NOTE — ED INITIAL ASSESSMENT (HPI)
LUQ abdominal pain x1 day. Pain starting to radiate to back. Denies N/V/D, fevers. Had parathyroid surgery in October.

## 2025-02-21 NOTE — TELEPHONE ENCOUNTER
Pt phoning in stating he needs authorization for Testosterone.    Received fax from [employer] stating PA was denied, needing more information.    Will need to follow up with [employer] to provide needing information.     Will wait for signed office visit note, and then to submit.     Paperwork placed in RN folder for now.

## 2025-02-21 NOTE — TELEPHONE ENCOUNTER
Patient called. He had surgery in Oct (robotic excision of intrathoracic parathyroid adenoma). He has had pain under left rib since then, reports \"nerves were stretched\". Both surgeon and PCP aware. Patient states yesterday he was coughing hard when he felt a severe pain on left side under rib, radiates to side. Pain is severe when sitting. He took 600 mg ibuprofen that was prescribed for post op pain which helped. He has an appointment with PCP next week but wondered if there was any availability to be seen today. Advised patient to go to ER or IC ([place] or [place]) for evaluation and imaging today. Also suggested patient reach out to surgeon's office for any further recs. Patient declines going to ER but will go to IC. Will also call surgeon.

## 2025-02-24 DIAGNOSIS — I10 PRIMARY HYPERTENSION: ICD-10-CM

## 2025-02-24 DIAGNOSIS — E78.00 HYPERCHOLESTEREMIA: ICD-10-CM

## 2025-02-27 RX ORDER — LOSARTAN POTASSIUM 25 MG/1
25 TABLET ORAL DAILY
Qty: 90 TABLET | Refills: 1 | Status: SHIPPED | OUTPATIENT
Start: 2025-02-27

## 2025-02-27 RX ORDER — EZETIMIBE 10 MG/1
10 TABLET ORAL NIGHTLY
Qty: 90 TABLET | Refills: 3 | Status: SHIPPED | OUTPATIENT
Start: 2025-02-27

## 2025-02-27 NOTE — TELEPHONE ENCOUNTER
Losartan 25m year supply sent to [facility] in [place] on 10/28/2024    Patient requesting [facility]

## 2025-02-27 NOTE — TELEPHONE ENCOUNTER
Refill passed per Clinic protocol.  Requested Prescriptions   Pending Prescriptions Disp Refills    ezetimibe 10 MG Oral Tab 90 tablet 1     Sig: Take 1 tablet (10 mg total) by mouth nightly.       Cholesterol Medication Protocol Passed - 2/27/2025  3:00 PM        Passed - ALT < 80     Lab Results   Component Value Date    ALT 25 01/31/2025             Passed - ALT resulted within past year        Passed - Lipid panel within past 12 months     Lab Results   Component Value Date    CHOLEST 202 (H) 07/16/2024    TRIG 192 (H) 07/16/2024    HDL 36 (L) 07/16/2024     (H) 07/16/2024    VLDL 35 (H) 07/16/2024    TCHDLRATIO 7.40 03/15/2021    NONHDLC 166 (H) 07/16/2024             Passed - In person appointment or virtual visit in the past 12 mos or appointment in next 3 mos     Recent Outpatient Visits              2 weeks ago Hypothyroidism, unspecified type    [facility] Geraldine Kaba DO    Office Visit    2 months ago Essential hypertension    [address]    Office Visit    3 months ago Essential hypertension    [address]    Office Visit    4 months ago Preoperative examination    [address] Lamin Persaud MD    Office Visit    6 months ago Hypothyroidism, unspecified type    [facility] Geraldine Kaba DO    Office Visit          Future Appointments         Provider Department Appt Notes    In 1 week Lamin Persaud MD [address] 3month follow up    In 4 months Geraldine Kaba DO [facility] **Dr. Oneil patient**  m f/u hypercalcemia, hypothyroid, low t **40 min f/u-pz  LOV 2/7/25 w/ Sesar  45 min modifier                    Passed - Medication is active on med list           losartan 25 MG Oral Tab 90 tablet 3     Sig: Take 1 tablet (25 mg total) by mouth daily.       Hypertension Medications Protocol Failed - 2/27/2025  3:00 PM        Failed - Last BP reading less than 140/90     BP Readings from Last 1 Encounters:   02/21/25 (!) 138/92               Passed - CMP or BMP in past 12 months        Passed - In person appointment or virtual visit in the past 12 mos or appointment in next 3 mos     Recent Outpatient Visits              2 weeks ago Hypothyroidism, unspecified type    [facility] Geraldine aKba DO    Office Visit    2 months ago Essential hypertension    [address] Lamin Garcia MD    Office Visit    3 months ago Essential hypertension    [address] Lamin Garcia MD    Office Visit    4 months ago [address] examination    [address]    Office Visit    6 months ago Hypothyroidism, unspecified type    [facility] Geraldine Kaba DO    Office Visit          Future Appointments         Provider Department Appt Notes    In 1 week Lamin Persaud MD [address] 3month follow up    In 4 months Geraldine Kaba DO [facility] **Dr. Oneil patient**  m f/u hypercalcemia, hypothyroid, low t **40 min f/u-pz  LOV 2/7/25 w/ Sesar  45 min modifier                    Passed - EGFRCR or GFRNAA > 50     GFR Evaluation  EGFRCR: 73 , resulted on 2/21/2025          Passed - Medication is active on med list

## 2025-02-27 NOTE — TELEPHONE ENCOUNTER
Please review; protocol failed/No Protocol      Patient requesting different pharmacy- sending what is left on script to correct pharmacy

## 2025-03-11 ENCOUNTER — PATIENT MESSAGE (OUTPATIENT)
Dept: INTERNAL MEDICINE CLINIC | Facility: CLINIC | Age: 55
End: 2025-03-11

## 2025-03-12 ENCOUNTER — TELEPHONE (OUTPATIENT)
Dept: INTERNAL MEDICINE CLINIC | Facility: CLINIC | Age: 55
End: 2025-03-12

## 2025-03-12 ENCOUNTER — OFFICE VISIT (OUTPATIENT)
Dept: INTERNAL MEDICINE CLINIC | Facility: CLINIC | Age: 55
End: 2025-03-12
Payer: COMMERCIAL

## 2025-03-12 VITALS
BODY MASS INDEX: 35.79 KG/M2 | HEIGHT: 70 IN | TEMPERATURE: 97 F | DIASTOLIC BLOOD PRESSURE: 88 MMHG | HEART RATE: 96 BPM | SYSTOLIC BLOOD PRESSURE: 132 MMHG | WEIGHT: 250 LBS | OXYGEN SATURATION: 93 % | RESPIRATION RATE: 16 BRPM

## 2025-03-12 DIAGNOSIS — G58.8 INTERCOSTAL NEURALGIA: ICD-10-CM

## 2025-03-12 DIAGNOSIS — J38.00 RECURRENT LARYNGEAL NERVE PARALYSIS: ICD-10-CM

## 2025-03-12 DIAGNOSIS — J98.6 DIAPHRAGM PARALYSIS: ICD-10-CM

## 2025-03-12 DIAGNOSIS — Z01.818 PRE-OP EVALUATION: Primary | ICD-10-CM

## 2025-03-12 PROCEDURE — 99214 OFFICE O/P EST MOD 30 MIN: CPT | Performed by: PHYSICIAN ASSISTANT

## 2025-03-12 NOTE — TELEPHONE ENCOUNTER
Pt called to schedule Pre-op. Appt scheduled for     Future Appointments   Date Time Provider Department Center   3/12/2025  3:30 PM Lacy Phelan PA-C [facility] [address] [facility]       Surgery date: 3/13/25  Surgeon: Dr Tomlin   # [phone]  Fax# [phone]    Our Pre-op form has been faxed to the Surgeons office and has been placed in the BrownPre-op folder in the phone room.           Spoke with Sherrie at Dr Tomlin's office to fax our pre op form right away. She stated she will fax it to my attention.

## 2025-03-12 NOTE — PROGRESS NOTES
[facility]  PRE OP RISK ASSESSMENT    REASON FOR CONSULT: Pre-op risk assessment for surgical procedure suspension microlaryngoscopy with vocal cord injection planned for 3/13/25 with general anesthesia.    REQUESTING PHYSICIAN: Dr. Tomlin    CHIEF COMPLAINT:   Chief Complaint   Patient presents with    Pre-Op     Rm 6 SS       HISTORY OF PRESENT ILLNESS:   The patient is a 54 year old  male presents for pre-op evaluation.  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  He had an injury to his RLN with that procedure and has had hoarseness, chest wall pain (L lower lateral, intercostal neuralgia) and elevation of his L hemidiaphragm since.  For this reason he has had some increase in VASQUEZ but is able to walk a mile without significant dyspnea or CP.  Treatment was being explored when paralysis of L vocal cord was noted and the above procedure recommended.  Pt does follow his O2 sats at home and he typically sats 90-95% since the above injury occurred to his RLN.  Pt is no longer taking medication for the intercostal neuralgia.  All the above sxs are stable (no worsening) since patient's surgery in Oct.      Pt was seen in IC for chest wall pain on 2/21/25, resulting from coughing.  Pt had an EKG then that was normal.  Troponin was negative then as well.  Labs were normal then as well.  Record reviewed.      Past Medical History:   Past Medical History:    Attention deficit hyperactivity disorder (ADHD)    COVID-19    fatigue    Disorder of thyroid    High cholesterol    Hyperlipidemia    Low testosterone    Thyroid disease    Visual impairment    glasses        Past Surgical History:    Past Surgical History:   Procedure Laterality Date    Other surgical history      Parathyroidectomy      Rotator cuff repair Right 01/2023       Current Medications:    Current Outpatient Medications   Medication Sig Dispense Refill    ezetimibe 10 MG Oral Tab Take 1 tablet (10  mg total) by mouth nightly. 90 tablet 3    losartan 25 MG Oral Tab Take 1 tablet (25 mg total) by mouth daily. 90 tablet 1    EUTHYROX 25 MCG Oral Tab Take 1 tablet (25 mcg total) by mouth before breakfast. 90 tablet 1    ARMOUR THYROID 60 MG Oral Tab Take 1 tablet (60 mg total) by mouth daily. + 1 tablet (15 mg) by mouth daily (75 mg total daily dose)  E03.9 90 tablet 1    ARMOUR THYROID 15 MG Oral Tab Take 1 tablet (15 mg total) by mouth daily. + 1 tablet (60 mg) by mouth daily (75 mg total daily dose) 90 tablet 1    testosterone cypionate 200 mg/mL Intramuscular Solution Inject 0.5 mL (100 mg total) into the muscle twice a week. 26 mL 1    hydroCHLOROthiazide 12.5 MG Oral Cap Take 1 capsule (12.5 mg total) by mouth daily. 90 capsule 0    pregabalin 50 MG Oral Cap Take 1 capsule (50 mg total) by mouth 2 (two) times daily.      celecoxib 100 MG Oral Cap Take 1 capsule (100 mg total) by mouth 2 (two) times daily with meals.      oxyCODONE 5 MG Oral Tab Take 1 tablet (5 mg total) by mouth every 4 (four) hours as needed for Pain.      Needle, Disp, (BD DISP NEEDLE) 25G X 1\" Does not apply Misc USE AS DIRECTED TWICE WEEKLY 100 each 0    methylphenidate ER 20 MG Oral Capsule SR 24 Hr Take 1 capsule (20 mg total) by mouth every morning.      methylphenidate 5 MG Oral Tab Take 1-2 tablets (5-10 mg total) by mouth daily as needed. IN THE AFTERNOON      BD SYRINGE SLIP TIP 1 ML Does not apply Misc USE FOR TESTOSTERONE TWICE WEEKLY.      Carboxymeth-Cellulose-CitricAc (PLENITY) Oral Cap Take 3 capsules by mouth 2 (two) times daily before meals. 180 capsule 1    Cholecalciferol (VITAMIN D3) 25 MCG (1000 UT) Oral Cap Take 2 tablets by mouth daily. 5,000 also has k-2          Allergies:    Allergies as of 03/12/2025    (No Known Allergies)       SOCIAL HISTORY:   Social History     Socioeconomic History    Marital status:      Spouse name: Not on file    Number of children: Not on file    Years of education: Not on file     Highest education level: Not on file   Occupational History    Not on file   Tobacco Use    Smoking status: Never    Smokeless tobacco: Never   Vaping Use    Vaping status: Never Used   Substance and Sexual Activity    Alcohol use: Yes     Alcohol/week: 2.0 standard drinks of alcohol     Types: 2 Standard drinks or equivalent per week    Drug use: Never    Sexual activity: Not on file   Other Topics Concern    Caffeine Concern Yes     Comment: coffee- 3 cups a day,Sugar free Redbull - couple a week, Black tea Once a day    Exercise Not Asked    Seat Belt Not Asked    Special Diet Not Asked    Stress Concern Not Asked    Weight Concern Not Asked   Social History Narrative    Not on file     Social Drivers of Health     Food Insecurity: No Food Insecurity (10/30/2024)    Received from [facility]    Hunger Vital Sign     Worried About Running Out of Food in the Last Year: Never true     Ran Out of Food in the Last Year: Never true   Transportation Needs: Unknown (10/30/2024)    Received from [facility]    PRAPARE - Transportation     Lack of Transportation (Medical): Not on file     Lack of Transportation (Non-Medical): No   Stress: Not on file   Housing Stability: Unknown (10/30/2024)    Received from [facility]    Housing Stability Vital Sign     Unable to Pay for Housing in the Last Year: No     Number of Times Moved in the Last Year: Not on file     Homeless in the Last Year: No        FAMILY HISTORY:   Family History   Problem Relation Age of Onset    Heart Disorder Father         Cardiac bypass    No Known Problems Mother         REVIEW OF SYSTEMS:    GENERAL: feels well otherwise  SKIN: denies any unusual skin lesions  HEENT: denies blurred vision or double vision denies nasal congestion  LUNGS: as noted above  CARDIOVASCULAR: denies chest pain on exertion  GI: denies abdominal pain, denies heartburn  : denies dysuria, urgency,  frequency, hematuria  MUSCULOSKELETAL: denies back pain  NEURO: denies headaches    PRE-OP ROS  NSAIDS/PLATELET INH: N/A  DIABETIC MEDICATIONS: N/A  JAYLIN; N/A  Hx of VTE: N/A    PHYSICAL EXAM:  /88   Pulse 96   Temp 97.2 °F (36.2 °C) (Temporal)   Resp 16   Ht 5' 10\" (1.778 m)   Wt 250 lb (113.4 kg)   SpO2 93%   BMI 35.87 kg/m²   GENERAL: Well developed, well nourished,in no apparent distress  SKIN: No rashes,no suspicious lesions  EYES: PERRLA, EOMI,conjunctiva are clear  HEENT: atraumatic, normocephalic,ears and throat are clear  NECK: supple,no adenopathy,no bruits  LUNGS: clear to auscultation except for absent BS at L lung base  CARDIO: RRR without murmur  GI: good BS's,no masses, HSM or tenderness    LABS:  Admission on 02/21/2025, Discharged on 02/21/2025   Component Date Value Ref Range Status    Urine Color 02/21/2025 Dark yellow  Yellow Final    Urine Clarity 02/21/2025 Clear  Clear Final    Specific Gravity, Urine 02/21/2025 >=1.030  1.005 - 1.030 Final    PH, Urine 02/21/2025 6.5  5.0 - 8.0 Final    Protein urine 02/21/2025 100 (A)  Negative mg/dL Final    Glucose, Urine 02/21/2025 Negative  Negative mg/dL Final    Ketone, Urine 02/21/2025 Negative  Negative mg/dL Final    Bilirubin, Urine 02/21/2025 Negative  Negative Final    Blood, Urine 02/21/2025 Negative  Negative Final    Nitrite Urine 02/21/2025 Negative  Negative Final    Urobilinogen urine 02/21/2025 <2.0  <2.0 mg/dL Final    Leukocyte esterase urine 02/21/2025 Negative  Negative Final    Ventricular rate 02/21/2025 81  BPM Final    Atrial rate 02/21/2025 81  BPM Final    P-R Interval 02/21/2025 136  ms Final    QRS Duration 02/21/2025 82  ms Final    Q-T Interval 02/21/2025 338  ms Final    QTC Calculation (Bezet) 02/21/2025 392  ms Final    P Axis 02/21/2025 22  degrees Final    R Axis 02/21/2025 20  degrees Final    T Axis 02/21/2025 7  degrees Final    WBC IC 02/21/2025 7.6  4.0 - 11.0 x10ˆ3/uL Final    RBC IC 02/21/2025 5.42   4.30 - 5.70 X10ˆ6/uL Final    HGB IC 02/21/2025 16.7  13.0 - 17.5 g/dL Final    HCT IC 02/21/2025 49.9  39.0 - 53.0 % Final    MCV IC 02/21/2025 92.1  80.0 - 100.0 fL Final    MCH IC 02/21/2025 30.8  26.0 - 34.0 pg Final    MCHC IC 02/21/2025 33.5  31.0 - 37.0 g/dL Final    PLT IC 02/21/2025 242.0  150.0 - 450.0 X10ˆ3/uL Final    # Neutrophil 02/21/2025 5.6  1.5 - 7.7 X10ˆ3/uL Final    # Lymphocyte 02/21/2025 1.4  1.0 - 4.0 X10ˆ3/uL Final    # Mixed Cells 02/21/2025 0.6  0.1 - 1.0 X10ˆ3/uL Final    Neutrophil % 02/21/2025 73.2  % Final    Lymphocyte % 02/21/2025 19.0  % Final    Mixed Cell % 02/21/2025 7.8  % Final    ISTAT Sodium 02/21/2025 141  136 - 145 mmol/L Final    ISTAT BUN 02/21/2025 17  7 - 18 mg/dL Final    ISTAT Potassium 02/21/2025 4.0  3.6 - 5.1 mmol/L Final    ISTAT Chloride 02/21/2025 104  98 - 112 mmol/L Final    ISTAT Ionized Calcium 02/21/2025 1.22  1.12 - 1.32 mmol/L Final    ISTAT Hematocrit 02/21/2025 50  37 - 53 % Final    ISTAT Glucose 02/21/2025 82  70 - 99 mg/dL Final    ISTAT TCO2 02/21/2025 26  21 - 32 mmol/L Final    ISTAT Creatinine 02/21/2025 1.20  0.70 - 1.30 mg/dL Final    eGFR-Cr 02/21/2025 73  >=60 mL/min/1.73m2 Final    ISTAT Troponin 02/21/2025 <0.02  <0.045 ng/mL ng/mL Final       ASSESSMENT AND PLAN:    There are no diagnoses linked to this encounter.    Encounter Diagnoses   Name Primary?    Pre-op evaluation - though pt's exercise capacity has changed due to his recurrent laryngeal nerve injury he has good exercise capacity and is at mild (and acceptable) risk for bertha-operative CV complications.  He may proceed with procedure as planned.  Care should be given to his respiratory status perioperatively given the above recurrent laryngeal nerve injury and resulting L hemidiaphragm paralysis.   Yes    Recurrent laryngeal nerve paralysis - following with Thoracic surgery, watchful waiting currently.       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  Resulting in stable VASQUEZ.        Intercostal neuralgia - improving and pt now off pain meds.        -  HTN - controlled on repeat measurement.    Case reviewed with Dr. Persaud    No orders of the defined types were placed in this encounter.      Imaging & Consults:  None    Lacy Phelan PA-C

## 2025-03-21 ENCOUNTER — OFFICE VISIT (OUTPATIENT)
Dept: INTERNAL MEDICINE CLINIC | Facility: CLINIC | Age: 55
End: 2025-03-21
Payer: COMMERCIAL

## 2025-03-21 VITALS
BODY MASS INDEX: 34.82 KG/M2 | HEIGHT: 70.5 IN | DIASTOLIC BLOOD PRESSURE: 84 MMHG | OXYGEN SATURATION: 97 % | RESPIRATION RATE: 18 BRPM | SYSTOLIC BLOOD PRESSURE: 126 MMHG | TEMPERATURE: 97 F | WEIGHT: 246 LBS | HEART RATE: 92 BPM

## 2025-03-21 DIAGNOSIS — R06.00 DYSPNEA, UNSPECIFIED TYPE: Primary | ICD-10-CM

## 2025-03-21 DIAGNOSIS — R49.0 HOARSENESS OF VOICE: ICD-10-CM

## 2025-03-21 DIAGNOSIS — J98.6 DIAPHRAGM PARALYSIS: ICD-10-CM

## 2025-03-21 DIAGNOSIS — J38.00 VOCAL CORD PARALYSIS: ICD-10-CM

## 2025-03-21 PROCEDURE — 99214 OFFICE O/P EST MOD 30 MIN: CPT | Performed by: FAMILY MEDICINE

## 2025-03-21 RX ORDER — GABAPENTIN 100 MG/1
100 CAPSULE ORAL 3 TIMES DAILY
COMMUNITY
Start: 2025-01-09 | End: 2026-04-04

## 2025-03-21 RX ORDER — IBUPROFEN 600 MG/1
600 TABLET, FILM COATED ORAL
COMMUNITY
Start: 2025-01-09

## 2025-03-21 RX ORDER — ACETAMINOPHEN 500 MG
1000 TABLET ORAL 3 TIMES DAILY
COMMUNITY
Start: 2025-01-09

## 2025-03-22 NOTE — PROGRESS NOTES
Hany Fontanez  6/19/1970    Chief Complaint   Patient presents with    Fatigue     Cg rm- 2, fatigue and L side swelling and INTMT pain for 5 mo.     Nerves       HPI:   Hany Fontanez is a 54 year old male who presents for follow-up. He did complete his vocal chord injections and has had improvement in his voice. However, he is still suffering from dyspnea, fatigue and left sided abdominal discomfort following his surgery. He had recent with his surgeon and his symptoms were deemed to be secondary to his diaphragmatic paralysis.  His surgeon is still hopeful that his diaphragm function will improve on its own but has discussed the possibility of needing plication.     Current Outpatient Medications   Medication Sig Dispense Refill    acetaminophen 500 MG Oral Tab Take 2 tablets (1,000 mg total) by mouth 3 (three) times daily.      gabapentin 100 MG Oral Cap Take 1 capsule (100 mg total) by mouth 3 (three) times daily.      ibuprofen 600 MG Oral Tab Take 1 tablet (600 mg total) by mouth.      ezetimibe 10 MG Oral Tab Take 1 tablet (10 mg total) by mouth nightly. 90 tablet 3    losartan 25 MG Oral Tab Take 1 tablet (25 mg total) by mouth daily. 90 tablet 1    EUTHYROX 25 MCG Oral Tab Take 1 tablet (25 mcg total) by mouth before breakfast. 90 tablet 1    ARMOUR THYROID 60 MG Oral Tab Take 1 tablet (60 mg total) by mouth daily. + 1 tablet (15 mg) by mouth daily (75 mg total daily dose)  E03.9 90 tablet 1    ARMOUR THYROID 15 MG Oral Tab Take 1 tablet (15 mg total) by mouth daily. + 1 tablet (60 mg) by mouth daily (75 mg total daily dose) 90 tablet 1    testosterone cypionate 200 mg/mL Intramuscular Solution Inject 0.5 mL (100 mg total) into the muscle twice a week. 26 mL 1    hydroCHLOROthiazide 12.5 MG Oral Cap Take 1 capsule (12.5 mg total) by mouth daily. 90 capsule 0    pregabalin 50 MG Oral Cap Take 1 capsule (50 mg total) by mouth 2 (two) times daily.      celecoxib 100 MG Oral Cap Take 1 capsule  (100 mg total) by mouth 2 (two) times daily with meals.      oxyCODONE 5 MG Oral Tab Take 1 tablet (5 mg total) by mouth every 4 (four) hours as needed for Pain.      Needle, Disp, (BD DISP NEEDLE) 25G X 1\" Does not apply Misc USE AS DIRECTED TWICE WEEKLY 100 each 0    methylphenidate ER 20 MG Oral Capsule SR 24 Hr Take 1 capsule (20 mg total) by mouth every morning.      methylphenidate 5 MG Oral Tab Take 1-2 tablets (5-10 mg total) by mouth daily as needed. IN THE AFTERNOON      BD SYRINGE SLIP TIP 1 ML Does not apply Misc USE FOR TESTOSTERONE TWICE WEEKLY.      Carboxymeth-Cellulose-CitricAc (PLENITY) Oral Cap Take 3 capsules by mouth 2 (two) times daily before meals. 180 capsule 1    Cholecalciferol (VITAMIN D3) 25 MCG (1000 UT) Oral Cap Take 2 tablets by mouth daily. 5,000 also has k-2        Allergies[1]   Past Medical History:    Attention deficit hyperactivity disorder (ADHD)    COVID-19    fatigue    Disorder of thyroid    High cholesterol    Hyperlipidemia    Low testosterone    Thyroid disease    Visual impairment    glasses      Patient Active Problem List   Diagnosis    Hypercholesteremia    Hypothyroidism    Attention deficit hyperactivity disorder (ADHD), predominantly inattentive type    Low testosterone in male    Primary hyperparathyroidism (HCC)    Anxiety and depression    Coronary artery calcification    Statin intolerance    Ectopic parathyroid adenoma    Mediastinal mass      Past Surgical History:   Procedure Laterality Date    Other surgical history      Parathyroidectomy      Rotator cuff repair Right 01/2023      Family History   Problem Relation Age of Onset    Heart Disorder Father         Cardiac bypass    No Known Problems Mother       Social History     Socioeconomic History    Marital status:    Tobacco Use    Smoking status: Never    Smokeless tobacco: Never   Vaping Use    Vaping status: Never Used   Substance and Sexual Activity    Alcohol use: Yes     Alcohol/week: 2.0  standard drinks of alcohol     Types: 2 Standard drinks or equivalent per week    Drug use: Never   Other Topics Concern    Caffeine Concern Yes     Comment: coffee- 3 cups a day,Sugar free Redbull - couple a week, Black tea Once a day     Social Drivers of Health     Food Insecurity: No Food Insecurity (10/30/2024)    Received from [facility]    Hunger Vital Sign     Worried About Running Out of Food in the Last Year: Never true     Ran Out of Food in the Last Year: Never true   Transportation Needs: Unknown (10/30/2024)    Received from [facility]    PRAPARE - Transportation     Lack of Transportation (Non-Medical): No   Housing Stability: Unknown (10/30/2024)    Received from [facility]    Housing Stability Vital Sign     Unable to Pay for Housing in the Last Year: No     Homeless in the Last Year: No         REVIEW OF SYSTEMS:   GENERAL: no recent illness, see HPI. No orthopnea, no exertional CP, on leg swelling.     EXAM:   /84 (BP Location: Left arm, Patient Position: Sitting, Cuff Size: large)   Pulse 92   Temp 97 °F (36.1 °C) (Tympanic)   Resp 18   Ht 5' 10.5\" (1.791 m)   Wt 246 lb (111.6 kg)   SpO2 97%   BMI 34.80 kg/m²   GENERAL: Well developed, well nourished,in no apparent distress  SKIN: Surgical incision sites are well-healed.  EYES: PERRLA, EOMI, conjunctiva are clear  HEENT: atraumatic, normocephalic,ears and throat are clear  NECK: supple,no adenopathy,no bruits  LUNGS: clear to auscultation  CARDIO: RRR without murmur  CHEST/ABD: Tenderness to palpation to the left lower chest wall and upper abdominal wall adjacent to some of his healed incisions.  There is no obvious swelling on exam.    ASSESSMENT AND PLAN:   Hany Fontanez is a 54 year old male who presents for follow-up.    Dyspnea, Diaphragm paralysis  His dyspnea is likely secondary to his diaphragmatic symptoms following thoracoscopy and parathyroid  adenoma resection.  He has been following with his thoracic surgeon who is hopeful that his diaphragm function will return without intervention.  He has discussed potential need for plication.  He had a recent CTA that was overall normal besides his elevated left hemidiaphragm.  We will further evaluate with pulmonary function testing and he will keep his upcoming follow-up with his thoracic surgeon.  He is also interested in a second opinion regarding this and I will assist in finding for him.  - Complete PFT; Future  - Pulmonary Referral - In Network    Hoarseness of voice, Vocal cord paralysis  Had recent vocal cord injections which has provided improvement in his voice.      All questions were answered and the patient agrees with the plan.     Thank you,  Lamin Persaud MD         [1] No Known Allergies

## 2025-03-28 ENCOUNTER — TELEPHONE (OUTPATIENT)
Dept: INTERNAL MEDICINE CLINIC | Facility: CLINIC | Age: 55
End: 2025-03-28

## 2025-03-28 ENCOUNTER — RT VISIT (OUTPATIENT)
Dept: RESPIRATORY THERAPY | Facility: HOSPITAL | Age: 55
End: 2025-03-28
Attending: FAMILY MEDICINE
Payer: COMMERCIAL

## 2025-03-28 DIAGNOSIS — J98.6 DIAPHRAGM PARALYSIS: Primary | ICD-10-CM

## 2025-03-28 DIAGNOSIS — J98.6 DIAPHRAGM PARALYSIS: ICD-10-CM

## 2025-03-28 NOTE — TELEPHONE ENCOUNTER
Patient called. He had PFT this morning. Advised patient that Dr Persaud will review results and we will be in touch.    Patient states he is unable to get in with Pulm/Dr Colbert until 5/29/25. OK to wait until then, see another partner or mid-level?    Patient also states Dr Persaud was going to give him a referral for second opinion regarding diaphragm symptoms.

## 2025-03-31 DIAGNOSIS — R79.89 LOW TESTOSTERONE IN MALE: ICD-10-CM

## 2025-03-31 NOTE — TELEPHONE ENCOUNTER
Received call from patient regarding Testosterone prescription- state it was filled last as 2 10 ml vials.     RN phoned pharmacy and spoke with pharmacist who states that on their end it looks like it was sent over as 2,000 mg/10 ml    RN reviewed that on our end it says 200 mg/ml- can be filled as 1 ml or 10 ml vial.    Pharmacist states it needs to be sent with a note to fill as 1 ml vials.     Pended for review.

## 2025-04-01 NOTE — TELEPHONE ENCOUNTER
Spoke to patient regarding  recommendation, verbalizes understanding. Patient prefers Mychart for referral information.

## 2025-04-01 NOTE — TELEPHONE ENCOUNTER
PFT's not yet read, no results available. Ok for first available pulmonologist. Recommend Dr. Ravinder Goncalves, thoracic surgeon, for second opinion regarding his diaphragm paralysis.

## 2025-04-03 PROCEDURE — 94726 PLETHYSMOGRAPHY LUNG VOLUMES: CPT | Performed by: INTERNAL MEDICINE

## 2025-04-03 PROCEDURE — 94729 DIFFUSING CAPACITY: CPT | Performed by: INTERNAL MEDICINE

## 2025-04-03 PROCEDURE — 94010 BREATHING CAPACITY TEST: CPT | Performed by: INTERNAL MEDICINE

## 2025-04-03 NOTE — PROCEDURES
Findings:  FEV1 is 2.21L, z-score of -2.59.  FVC is 2.56L, z-score of -3.11.  FEV1/ FVC ratio is 0.86.  The flow-volume loop demonstrates a restrictive pattern.  The TLC is 4.24L, z-score of -3.64.  The residual volume 1.68L, z-score of -0.61.  The diffusion capacity is 20.71 with z-score of -2.05. When corrected for alveolar volume, the diffusion capacity is 7.23 with z-score of 4.05.  Impression:  There is no airway obstruction on spirometry and visualized on flow-volume loop.  There is moderate restriction with total lung capacity of 4.24L, z-score of -3.64. This can be seen in heart failure, fluid overload states, interstitial lung disease, thoracic spine/chest disorders, obesity as well as other clinical scenarios.  Further clinical correlation required.    Diffusion capacity is mildly reduced but improves to normal when considering alveolar volume. This may represent a normal finding but can be seen in emphysema, interstitial lung disease, pulmonary vascular disease (such as pulmonary hypertension) and anemia. If not already performed, would suggest further evaluation as determined clinically.  There are no previous pulmonary function tests available for comparison.   Of note, this testing was performed in accordance to ATS/ERS interpretation guidelines with the use of upper and lower limits of normal as well as z-score references. Previous testing (before March 2024) was not performed at [facility] using z-scores and so comparison to previous testing should be taken with caution.

## 2025-04-04 RX ORDER — TESTOSTERONE CYPIONATE 200 MG/ML
100 INJECTION, SOLUTION INTRAMUSCULAR
Qty: 26 ML | Refills: 1 | Status: SHIPPED | OUTPATIENT
Start: 2025-04-04

## 2025-04-04 NOTE — TELEPHONE ENCOUNTER
RN phoned pharmacy to check in on prescription sent today- States that prescription looks good and is on order.     Mychart sent for review.

## 2025-04-08 ENCOUNTER — ORDER TRANSCRIPTION (OUTPATIENT)
Dept: PHYSICAL THERAPY | Facility: HOSPITAL | Age: 55
End: 2025-04-08

## 2025-04-08 DIAGNOSIS — J38.01 PARALYSIS OF LEFT VOCAL CORD: Primary | ICD-10-CM

## 2025-04-08 DIAGNOSIS — R49.0 DYSPHONIA: ICD-10-CM

## 2025-04-09 ENCOUNTER — TELEPHONE (OUTPATIENT)
Dept: PHYSICAL THERAPY | Facility: HOSPITAL | Age: 55
End: 2025-04-09

## 2025-04-10 ENCOUNTER — OFFICE VISIT (OUTPATIENT)
Dept: SPEECH THERAPY | Facility: HOSPITAL | Age: 55
End: 2025-04-10
Payer: COMMERCIAL

## 2025-04-10 DIAGNOSIS — R49.0 DYSPHONIA: ICD-10-CM

## 2025-04-10 DIAGNOSIS — J38.01 PARALYSIS OF LEFT VOCAL CORD: Primary | ICD-10-CM

## 2025-04-10 PROCEDURE — 92524 BEHAVRAL QUALIT ANALYS VOICE: CPT

## 2025-04-10 NOTE — PROGRESS NOTES
ADULT SLP EVALUATION:     Diagnosis:   L TVC paralysis and dysphonia Patient:  Hany Fontanez (54 year old, male)        Referring Provider: Kelly Clay  Today's Date: 4/10/2025    Precautions:   Other (use comment) Date of Evaluation: 04/10/25  Next MD visit: No data recorded     PATIENT SUMMARY   Summary of chief complaints: weak voice with limited ability to sustain phonation for long durations of time. Patient reports fatigue with voice throughout the day.   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 Patient with glottic gap and aphonia following procedure. Patient with prolaryn injection on 3/13/2025 with some noticed improvement in voicing.   Pain level:  0 /10  Current limitations: difficulty communicating within social and occupational tasks 2/2 dysphonia.  Pt goals: improve voicing     Hospital History: N/A     Past medical history was reviewed with Hany.  Significant findings include: parathyroid mass, paralyzed diaphragm, L TVC paralysis  Hany Fontanez  has a past medical history of Attention deficit hyperactivity disorder (ADHD), COVID-19 (11/12/2022), Disorder of thyroid, High cholesterol, Hyperlipidemia, Low testosterone, Thyroid disease, and Visual impairment.  Medications Ordered Prior to this Encounter[1]    ASSESSMENT  Hany presents to speech therapy evaluation with primary c/o weak voice. The results of the objective tests and measures show dysphonia c/b breathiness, decreased coordination of respiration and phonation, decreased breath support, laryngeal muscle tension and strain. Functional deficits include but are not limited to difficulty communicating within social and occupational tasks 2/2 dysphonia. Signs and symptoms are consistent with diagnosis of L TVC paralysis and dysphonia. Pt and SLP discussed evaluation findings, pathology, POC and HEP.  Pt voiced  understanding and performs HEP correctly. Skilled Speech Therapy is medically necessary to address the above impairments and reach functional goals.     OBJECTIVE:     VOCAL HANDICAP INDEX SCORE   Functional severe; 27 /40   Physical moderate; 24 /40   Emotional moderate; 24 /40   TOTAL SCORE severe; 75 /120      VOICE ANALYSIS   Voice History and Hygiene   Current Voice Diagnosis: dysphonia  Date of Onset: October 2024  Date of ENT Evaluation: 4/4/2025  Laryngoscopy Findings: L VC paralysis    Daily water intake: < 16oz (varies based on the day)  Caffeine intake (glasses/day): 2  Alcohol intake (glasses/day): 1 (occassional every other week)  Smoking history: non-smoker    Current vocal demands: dysphonia  Vocal Abuses: social; occupational; talking on phone/computer  Triggers: coughing/sneezing loudly; talking in noisy environments; excessive telephone use; talking for extended periods of time   Consensus Auditory-Perceptual Evaluation of Voice (CAPE-V)   Overall Severity moderately-severe deviant; 75 /100   Roughness mild-moderately deviant; 25 /100   Breathiness moderately-severe deviant; 80 /100   Strain moderately deviant; 75 /100   Pitch moderately-severe deviant; 80 /100   Loudness moderately-severe deviant; 70 /100   Maximum   Phonation time   3 sec   (15-25+ sec is within normal limits)   S/Z Ratio 1.5 (>1.4 is abnormal)   Resonance: oral focused   Respiration thoracic breathing; clavicular breathing (mixed)        ORAL MOTOR EXAM   Facial and Oral Structure/Appearance:  intact   Dentition  adequate   Symmetry  symmetrical   Strength  reduced labial (R)   Tone  reduced lingual (R)   Range of Motion  WFL   Rate of Motion  WFL   Resonance  oral focused   Respiration  clavicular breathing; thoracic breathing (mixed)   Articulation  WFL   Voice Quality  breathy       Today's Treatment and Response:   Pt education was provided on exam findings, treatment diagnosis, treatment plan, expectations, and  prognosis.  Charges: TAI reyna 1 [id number],  Total Treatment Time: 60 min                                                  PLAN OF CARE:    Goals: (to be met in 8 visits)   Patient will name and report use of appropriate vocal hygiene strategies given min verbal and visual cues.      Progress: New goal   2.   Patient will complete vocal function and breath support exercises with 85% accuracy given mod verbal and visual cues.    Progress:   New goal   3.   Patient will demonstrate diaphragmatic breathing at rest in 9/10 attempts given mod verbal and visual cues.    Progress: New goal   4.   Patient will demonstrate adequate breath support for production of reading and conversation within 80% of attempts given mod verbal and visual cues.      Progress: New goal   5.  Patient will independently complete home exercise program (i.e., vocal function and breath support exercises) at least 6 times per week across 6 weeks.      Progress: New goal   6.   Patient will indicate reduced voice handicap after completing vocal function and breath support exercises measured by improved Voice Handicap Index score (baseline 75/120).      Progress: New goal              Frequency / Duration: Patient will be seen 1x/week or a total of 8  visits over a 90 day period. Treatment will include: speech therapy    Education or treatment limitation: None   Rehab Potential: good    Patient/Family/Caregiver was advised of these findings, precautions, and treatment options and has agreed to actively participate in planning and for this course of care.    Thank you for your referral. Please co-sign or sign and return this letter via fax as soon as possible to [phone]. If you have any questions, please contact me at Dept: [phone]    Sincerely,  Electronically signed by therapist: SAUD Mojica  Physician's certification required: Yes  I certify the need for these services furnished under this plan of treatment and while under my  care.    X___________________________________________________ Date____________________    Certification From: 4/10/2025  To:7/9/2025         [1] Current Outpatient Medications on File Prior to Visit: testosterone cypionate 200 mg/mL Intramuscular Solution, Inject 0.5 mL (100 mg total) into the muscle twice a week. Fill in 1 ml vials., acetaminophen 500 MG Oral Tab, Take 2 tablets (1,000 mg total) by mouth 3 (three) times daily., gabapentin 100 MG Oral Cap, Take 1 capsule (100 mg total) by mouth 3 (three) times daily., ibuprofen 600 MG Oral Tab, Take 1 tablet (600 mg total) by mouth., ezetimibe 10 MG Oral Tab, Take 1 tablet (10 mg total) by mouth nightly., losartan 25 MG Oral Tab, Take 1 tablet (25 mg total) by mouth daily., EUTHYROX 25 MCG Oral Tab, Take 1 tablet (25 mcg total) by mouth before breakfast., ARMOUR THYROID 60 MG Oral Tab, Take 1 tablet (60 mg total) by mouth daily. + 1 tablet (15 mg) by mouth daily (75 mg total daily dose)  E03.9, ARMOUR THYROID 15 MG Oral Tab, Take 1 tablet (15 mg total) by mouth daily. + 1 tablet (60 mg) by mouth daily (75 mg total daily dose), hydroCHLOROthiazide 12.5 MG Oral Cap, Take 1 capsule (12.5 mg total) by mouth daily., pregabalin 50 MG Oral Cap, Take 1 capsule (50 mg total) by mouth 2 (two) times daily., celecoxib 100 MG Oral Cap, Take 1 capsule (100 mg total) by mouth 2 (two) times daily with meals., oxyCODONE 5 MG Oral Tab, Take 1 tablet (5 mg total) by mouth every 4 (four) hours as needed for Pain., Needle, Disp, (BD DISP NEEDLE) 25G X 1\" Does not apply Misc, USE AS DIRECTED TWICE WEEKLY, methylphenidate ER 20 MG Oral Capsule SR 24 Hr, Take 1 capsule (20 mg total) by mouth every morning., methylphenidate 5 MG Oral Tab, Take 1-2 tablets (5-10 mg total) by mouth daily as needed. IN THE AFTERNOON, BD SYRINGE SLIP TIP 1 ML Does not apply Misc, USE FOR TESTOSTERONE TWICE WEEKLY., Carboxymeth-Cellulose-CitricAc (PLENITY) Oral Cap, Take 3 capsules by mouth 2 (two) times daily  before meals., Cholecalciferol (VITAMIN D3) 25 MCG (1000 UT) Oral Cap, Take 2 tablets by mouth daily. 5,000 also has k-2  Current Facility-Administered Medications on File Prior to Visit: [COMPLETED] iopamidol 76% (ISOVUE-370) injection for power injector,

## 2025-04-15 ENCOUNTER — OFFICE VISIT (OUTPATIENT)
Dept: SPEECH THERAPY | Facility: HOSPITAL | Age: 55
End: 2025-04-15
Payer: COMMERCIAL

## 2025-04-15 PROCEDURE — 92507 TX SP LANG VOICE COMM INDIV: CPT

## 2025-04-15 NOTE — PROGRESS NOTES
Patient: Hany Fontanez (54 year old, male) Referring Provider:  Insurance:   Diagnosis: L TVC paralysis and dysphonia Physician Nonstaff  [employer]   Precautions:  Other (use comment) Next MD visit:  N/A    No data recorded Referral Information:    Date of Evaluation: Req: 0, Auth: 0, Exp:     04/10/25 POC Auth Visits:  8       Today's Date   4/15/2025        Treatment Day: 2    Subjective  Patient arrived on time to session. Patient participated activley in therapeutic tasks and reports some completion of HEP within the last week. Patient reports not feeling as fatigued d/t not talking as much in the day as he usually does. Patient to see pulmonologist on 4/24.       Pain: 0/10     Objective  See goals below.       Goals: (to be met in 8 visits)        Patient will name and report use of appropriate vocal hygiene strategies given min verbal and visual cues.    Reported use of vocal hygiene strategies within some completion of HEP.     Progress: Progressing   2.   Patient will complete vocal function and breath support exercises with 85% accuracy given mod verbal and visual cues.    Requires max verbal cueing with review of each exercise. Tolerated fading to mod for accurate execution and coordination.    Progress:    Progressing   3.   Patient will demonstrate diaphragmatic breathing at rest in 9/10 attempts given mod verbal and visual cues.    9/10 attempts. Mod cueing required to gauge clavicular breathing, needed verbal supports to re-engage diaphragmatic breathing.    Progress: Progressing   4.   Patient will demonstrate adequate breath support for production of reading and conversation within 80% of attempts given mod verbal and visual cues.    Needed mod verbal cues to use diaphragmatic breathing effectively rather than quick shallow breaths. Benefits from frequent verbal cues to correct within conversation.     Progress: Progressing   5.  Patient will independently complete home exercise program (i.e.,  vocal function and breath support exercises) at least 6 times per week across 6 weeks.    Patient reports some exercise completion at home. Provided education on the importance of a consistent regimen to practice twice a day.     Progress: Progressing   6.   Patient will indicate reduced voice handicap after completing vocal function and breath support exercises measured by improved Voice Handicap Index score (baseline 75/120).    Goal in progress.     Progress: Progressing                      Assessment  Patient presents to speech therapy with dysphonia c/b breathiness, decreased coordination of respiration and phonation, decreased breath support, laryngeal muscle tension and strain. Functional deficits include but are not limited to difficulty communicating within social and occupational tasks 2/2 dysphonia. Patient provided with education on vocal hygiene strategies to increase water intake, easy vocing, and use of headset when talking on the phone in the car. He benefitted from continued training on the exercises to continue to promote coordination for deep breathing to adequately support the easy voicing without pushing or added tension for the exercises and in natural conversation. Trialed multiple pitches, able to acheive 4 sustained pitches. Sustained phonation and glides were much improved from previous session, may be d/t less vocal fatigue in today's session. Continue to practice within structured contexts (e.g., exercises and functional phrases) to gauge when additional breath support is needed to avoid pitch breaks. Patient was provided education on the importance of consistency with exercise regimine for HEP completion. Continued intervention is medically necessary.    Plan  Continue speech-language therapy targeting voice.    HEP  VFEs, vocal hygiene, deep diphragmatic breathing, functional phrases     Charges  [id number]    Total Treatment Time: 45 min

## 2025-04-24 ENCOUNTER — TELEPHONE (OUTPATIENT)
Dept: SPEECH THERAPY | Facility: HOSPITAL | Age: 55
End: 2025-04-24

## 2025-04-24 ENCOUNTER — TELEPHONE (OUTPATIENT)
Facility: CLINIC | Age: 55
End: 2025-04-24

## 2025-04-24 ENCOUNTER — APPOINTMENT (OUTPATIENT)
Dept: SPEECH THERAPY | Facility: HOSPITAL | Age: 55
End: 2025-04-24
Payer: COMMERCIAL

## 2025-04-24 ENCOUNTER — OFFICE VISIT (OUTPATIENT)
Facility: CLINIC | Age: 55
End: 2025-04-24
Payer: COMMERCIAL

## 2025-04-24 VITALS
BODY MASS INDEX: 35.93 KG/M2 | HEIGHT: 70 IN | OXYGEN SATURATION: 97 % | SYSTOLIC BLOOD PRESSURE: 130 MMHG | TEMPERATURE: 97 F | WEIGHT: 251 LBS | DIASTOLIC BLOOD PRESSURE: 82 MMHG | HEART RATE: 93 BPM | RESPIRATION RATE: 16 BRPM

## 2025-04-24 DIAGNOSIS — J98.6 DIAPHRAGM PARALYSIS: Primary | ICD-10-CM

## 2025-04-24 DIAGNOSIS — R05.9 COUGH IN ADULT: ICD-10-CM

## 2025-04-24 DIAGNOSIS — R06.02 SHORTNESS OF BREATH: ICD-10-CM

## 2025-04-24 DIAGNOSIS — R79.89 LOW TESTOSTERONE IN MALE: Primary | ICD-10-CM

## 2025-04-24 DIAGNOSIS — G47.30 SLEEP APNEA, UNSPECIFIED TYPE: ICD-10-CM

## 2025-04-24 PROCEDURE — 99205 OFFICE O/P NEW HI 60 MIN: CPT | Performed by: INTERNAL MEDICINE

## 2025-04-24 RX ORDER — FLUTICASONE FUROATE AND VILANTEROL 100; 25 UG/1; UG/1
1 POWDER RESPIRATORY (INHALATION) DAILY
Qty: 1 EACH | Refills: 8 | Status: SHIPPED | OUTPATIENT
Start: 2025-04-24

## 2025-04-24 RX ORDER — TRIAMCINOLONE ACETONIDE 55 UG/1
2 SPRAY, METERED NASAL DAILY
Qty: 1 EACH | Refills: 2 | Status: SHIPPED | OUTPATIENT
Start: 2025-04-24 | End: 2025-05-24

## 2025-04-24 RX ORDER — AZELASTINE HYDROCHLORIDE 137 UG/1
1-2 SPRAY, METERED NASAL 2 TIMES DAILY
Qty: 1 EACH | Refills: 3 | Status: SHIPPED | OUTPATIENT
Start: 2025-04-24 | End: 2025-05-24

## 2025-04-24 RX ORDER — ALBUTEROL SULFATE 90 UG/1
2 INHALANT RESPIRATORY (INHALATION) EVERY 6 HOURS PRN
Qty: 1 EACH | Refills: 3 | Status: SHIPPED | OUTPATIENT
Start: 2025-04-24

## 2025-04-24 NOTE — PATIENT INSTRUCTIONS
Plan:    Breo Ellipta 100-25 mcg one puff daily   Albuterol HFA MDI 2 puffs 4 times daily as needed   Azelastine 137 mcg 1 puff twice daily as needed for congested and runny nose   Nasacort 2 puffs each nostril daily  Incentive Spirometry every 4 hours as possible   Check sniff test/fluoroscopy to rule out left diaphragmatic paralysis  Thoracic Surgery consult with Dr. Manny Bolanos for evaluation of left diaphragmatic plication versus diaphragmatic pacing etc.  If with diaphragmatic paralysis is noted on sniff test/fluoroscopy.  Patient wishes treatment at [facility] [place]  ENT follow up for reevaluation of hoarseness  GERD precautions   Schedule Home sleep study     Follow up:  3  months       Mat Colbert MD    GERD (Adult)    The esophagus is a tube that carries food from the mouth to the stomach. A valve (lower esophageal sphincter) prevents stomach acid from flowing upward. Sometimes this valve doesn't work correctly. Then stomach contents may flow (reflux) into the esophagus. When it happens again and again, it's called GERD (gastroesophageal reflux disease). GERD can irritate the esophagus. It can cause pain. It can also cause problems with swallowing or breathing. In severe cases, GERD can cause pneumonia that keeps coming back. This is from breathing in particles (aspiration).   Symptoms of reflux include burning, pressure, or sharp pain in the upper belly (abdomen). Symptoms may also be in the mid- to lower chest. The pain can spread to the neck, back, or shoulder. You may have:   Belching  Acid taste in the back of the throat  Chronic cough  Sore throat  Hoarseness  GERD symptoms often occur during the day after a big meal. They can also occur at night when lying down.    Home care  Lifestyle changes can help ease symptoms. Your healthcare provider may also prescribe medicines. Symptoms often get better with treatment. But if treatment is stopped, the symptoms often return after a  few months. Most people with GERD will need to continue treatment. Or they may need treatment on and off.   Lifestyle changes  Limit or don't eat fatty, fried, or spicy foods. Also limit coffee, chocolate, mint, and foods with high acid content. These include tomatoes and citrus fruit and juices (orange, grapefruit, and lemon).  Don’t eat large meals, especially at night. Frequent, smaller meals are best. Don't lie down right after eating. And don’t eat anything 3 hours before going to bed.  Don't drink alcohol or smoke. As much as possible, stay away from secondhand smoke.  If you are overweight, losing weight will reduce symptoms.   Don't wear tight clothing around your stomach area.  If your symptoms occur during sleep, use a foam wedge to raise your upper body not just your head. Or place 4-inch blocks under the head of your bed. Or use 2 bed risers under your bed frame.  Talk with your provider if you have trouble making the suggested lifestyle changes. They may be able to give you resources to help.  Medicines  Medicines can help ease the symptoms of GERD. They also help prevent damage to the esophagus. Discuss a medicine plan with your healthcare provider. This may include one or more of these medicines:   Antacids. These help neutralize the normal acids in your stomach.  Acid blockers (histamine or H2 blockers). These decrease how much acid your stomach makes.  Acid inhibitors (proton pump inhibitors PPIs). These also decrease how much acid your body makes, but in a different way from the blockers. They may work better. But they can take a little longer to do so.  Take an antacid 30 to 60 minutes after eating and at bedtime, but not at the same time as an acid blocker. Try not to take medicines such as ibuprofen and aspirin. If you take aspirin for your heart or other health reasons, talk with your healthcare provider about stopping it.   Follow-up care  Follow up with your healthcare provider as advised.    When to seek medical advice  Call your healthcare provider if any of the following occur:  Stomach pain gets worse or moves to the lower right belly (appendix area)  Chest pain appears or gets worse, or spreads to the back, neck, shoulder, or arm  An over-the-counter trial of medicine doesn't relieve your symptoms  Weight loss that can't be explained  Trouble or pain swallowing  Frequent vomiting (can’t keep down liquids)  Blood in the stool or vomit (red or black in color)  Feeling weak or dizzy  Fever of 100.4ºF (38ºC) or higher, or as directed by your healthcare provider  Symptoms get worse or you have new symptoms  [employer] last reviewed this educational content on 11/1/2021 © 2000-2023 [employer]. All rights reserved. This information is not intended as a substitute for professional medical care. Always follow your healthcare professional's instructions.      What Is GERD?  If you often have a painful burning feeling in your chest after you eat, you may have gastroesophageal reflux disease (GERD). Heartburn that keeps coming back is a classic symptom of GERD. But you may have other symptoms as well. A GERD diagnosis is made only after a complete evaluation by your healthcare provider.   Note: Chest pain may also be caused by heart problems. Be sure to have all chest pain evaluated by a healthcare provider.   When you have a reflux problem  After you eat, food travels from your mouth down the esophagus to your stomach. Along the way, food passes through a 1-way valve called the lower esophageal sphincter (LES). The LES sits at the opening to your stomach. Normally the LES opens when you swallow. It lets food enter the stomach, then closes quickly. With GERD, the LES doesn’t work normally. It lets food and stomach acid flow back (reflux) into the esophagus.      With GERD, the weak LES allows food and fluids to travel back, or reflux, into the esophagus.       Some common symptoms  Frequent  heartburn  Frequent burping  Sour- or acid-tasting fluid backing up into your mouth  Symptoms that get worse after you eat, bend over, or lie down  Trouble swallowing or pain when swallowing  A dry, long-term (chronic) cough  Upset stomach (nausea). But nausea and vomiting may be a sign of something else, so be sure to discuss with your provider.     Relieving your discomfort  You and your healthcare provider can work together to find the treatment options that best ease your symptoms. These may include lifestyle changes, medicine, and possibly surgery.   Many people find their GERD symptoms decrease when they eat small frequent meals instead of 3 large ones. Reducing the amount of fatty foods in your diet will also help.    The following foods tend to cause problems for people diagnosed with GERD:   Tomatoes and tomato products  Alcohol  Coffee  Peppermint  Greasy or spicy foods  Acidic foods such as citrus  To ease your symptoms, raise the head of your bed 4 to 6 inches. Don't eat anything within 2 to 3 hours of laying down.   Losing weight, if you are overweight, often eases GERD symptoms.  Stopping smoking can also help GERD symptoms.  Talk with your provider if you don’t understand how to make the dietary changes needed to control your GERD symptoms. Your provider can refer you to a nutritionist.   [employer] last reviewed this educational content on 9/1/2021 © 2000-2023 [employer]. All rights reserved. This information is not intended as a substitute for professional medical care. Always follow your healthcare professional's instructions.

## 2025-04-24 NOTE — TELEPHONE ENCOUNTER
Patient presented to office. States he is going to Pulm visit next door to our office.    He is is need of refills of 1 ml syringes with 25g 1\" needles for Testosterone injections.    He spoke with pharmacy downstairs- and they have about 100 left of these. They have since been discontinued. Patient prefers these and is hoping to get a prescription for the remaining supply.    He'd like to pick them up after his Pulm appointment.     Prescription sent to pharmacy.    Closing this encounter.

## 2025-04-24 NOTE — PROGRESS NOTES
Pulmonary/Critical Care/Sleep Medicine    Consult Note     PCP: Lamin Persaud MD   Phone: [phone]   Fax: [phone]        Chief Complaint   Patient presents with    New Patient     Pt referred by pcp, pt states sob        HPI  I had the pleasure of seeing Hany Fontanez who is a pleasant 54 year old male who presents for evaluation of shortness of breath      The patient states that about 5 years ago he was diagnosed with hypercalcemia on routine labs and he was being followed.  About 2 years ago he was diagnosed with hyperparathyroidism.  But parathyroid gland in the neck workup did not show any obvious parathyroid adenoma.    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  On 10/30/2024 he underwent Left robotic assisted thoracoscopy with resection parathyroid adenoma at [facility] .  Postoperatively he was noted to have hoarseness.  He has been diagnosed with left diaphragmatic paralysis and left vocal cord paralysis. He states that he has been short of breath and hoarse voice since his surgery on 10/30/2024. His left vocal cord was injected 4-6 weeks ago     He admits to coughing frequently, with yellowish phlegm, and wheezing, he admits to left upper abdominal pain. He admits to VASQUEZ 2 flight of stairs    On today's visit the He denies nasal congestion,, chest pain,  hemoptysis, fever, chills, nausea, vomiting, abdominal pains, focal weakness, weight loss or night sweats.    The patient states that he has occ snored at home in past and is tired during the daytime. Though he     The patient denies kicking legs at night. Denies teeth grinding.    He drinks 2 cups of caffeine coffee daily, occ caffeine glasses of tea and  occ caffeine cans of soda daily.       He has no pets          Hx of tobacco use: He  reports that he has never smoked. He has  never used smokeless tobacco.    Past Medical History[1]   Past Surgical History[2]  Allergies[3]  Current Medications[4]   Short Social Hx on File[5]   Immunization History   Administered Date(s) Administered    Covid-19 Vaccine Moderna 100 mcg/0.5 ml 03/20/2021, 04/17/2021    Covid-19 Vaccine Moderna 50 Mcg/0.25 Ml 01/05/2022    FLULAVAL 6 months & older 0.5 ml Prefilled syringe ([id number]) 10/10/2022    Flublok Quad Influenza Vaccine ([id number]) 11/07/2021    Flucelvax 0.5 Ml Quad PFS Single Dose 12/05/2019, 11/12/2020    Flucelvax Influenza vaccine, trivalent (ccIIV3), 0.5mL IM 12/05/2019, 11/12/2020    Zoster Vaccine Recombinant Adjuvanted (Shingrix) 05/15/2023, 05/06/2024      Family History[6]     Review of Systems   Constitutional:  Positive for fatigue. Negative for fever and unexpected weight change.   HENT:  Positive for congestion. Negative for mouth sores, nosebleeds, postnasal drip, rhinorrhea, sore throat and trouble swallowing.    Eyes:  Negative for visual disturbance.   Respiratory:  Positive for cough, shortness of breath and wheezing. Negative for apnea, choking and chest tightness.    Cardiovascular:  Negative for chest pain, palpitations and leg swelling.   Gastrointestinal:  Negative for abdominal pain, constipation, diarrhea, nausea and vomiting.   Genitourinary:  Negative for difficulty urinating.   Musculoskeletal:  Negative for arthralgias, back pain, gait problem and myalgias.   Neurological:  Negative for dizziness, weakness and headaches.   Psychiatric/Behavioral:  Positive for sleep disturbance.         Vitals:    04/24/25 0923   BP: 130/82   Pulse: 93   Resp: 16   Temp: 96.5 °F (35.8 °C)      SpO2: 97 %  Ht Readings from Last 1 Encounters:   04/24/25 5' 10\" (1.778 m)     Wt Readings from Last 1 Encounters:   04/24/25 251 lb (113.9 kg)     Body mass index is 36.01 kg/m².     Physical Exam  Constitutional:       General: He is not in acute distress.     Appearance: Normal appearance. He is  obese. He is not ill-appearing or diaphoretic.   HENT:      Head: Normocephalic and atraumatic.      Nose: Nose normal. No congestion or rhinorrhea.      Comments: Narrow nares      Mouth/Throat:      Mouth: Mucous membranes are moist.      Pharynx: Oropharynx is clear. No oropharyngeal exudate or posterior oropharyngeal erythema.      Comments: Mallampati class III palate   Eyes:      Extraocular Movements: Extraocular movements intact.      Pupils: Pupils are equal, round, and reactive to light.   Cardiovascular:      Rate and Rhythm: Normal rate.      Pulses: Normal pulses.      Heart sounds: Normal heart sounds. No murmur heard.  Pulmonary:      Effort: Pulmonary effort is normal. No respiratory distress.      Breath sounds: Normal breath sounds. No wheezing or rhonchi.   Chest:      Chest wall: No tenderness.   Abdominal:      General: Abdomen is flat. Bowel sounds are normal.      Palpations: Abdomen is soft.   Musculoskeletal:         General: Normal range of motion.   Skin:     General: Skin is warm.   Neurological:      General: No focal deficit present.      Mental Status: He is alert and oriented to person, place, and time.   Psychiatric:         Mood and Affect: Mood normal.         Behavior: Behavior normal.         Thought Content: Thought content normal.         Judgment: Judgment normal.             Labs:  Last BMP  Lab Results   Component Value Date    GLU 80 01/31/2025    BUN 14 01/31/2025    CREATSERUM 1.31 (H) 01/31/2025    ANIONGAP 12 01/31/2025    GFRAA 88 03/31/2022    GFRNAA 76 03/31/2022    CA 9.5 01/31/2025     01/31/2025    K 4.4 01/31/2025     01/31/2025    CO2 27.0 01/31/2025    OSMOCALC 293 01/31/2025      Last CBC  Lab Results   Component Value Date    WBC 8.9 11/05/2024    RBC 5.41 11/05/2024    HGB 16.1 11/05/2024    HCT 46.9 11/05/2024    MCV 92.1 02/21/2025    MCH 29.8 11/05/2024    MCHC 33.5 02/21/2025    RDW 13.5 11/05/2024    .0 11/05/2024      Last CMP  Lab  Results   Component Value Date    GLU 80 01/31/2025    BUN 14 01/31/2025    CREATSERUM 1.31 (H) 01/31/2025    ANIONGAP 12 01/31/2025    GFRNAA 76 03/31/2022    GFRAA 88 03/31/2022    CA 9.5 01/31/2025    OSMOCALC 293 01/31/2025    ALKPHO 70 01/31/2025    AST 28 01/31/2025    ALT 25 01/31/2025    BILT 1.0 01/31/2025    TP 7.2 01/31/2025    ALB 4.7 01/31/2025    GLOBULIN 2.5 01/31/2025     01/31/2025    K 4.4 01/31/2025     01/31/2025    CO2 27.0 01/31/2025      Last Thyroid Function  Lab Results   Component Value Date    T4F 1.0 01/31/2025    TSH 2.124 01/31/2025        Imaging:  No results found.  PROCEDURE:  CT ANGIOGRAPHY, CHEST (CPT=71275)     COMPARISON:  [facility] , CT, CT CALCIUM SCORING OVER READ, 8/06/2020, 5:41 PM.     INDICATIONS:  parathyroid adenoma robotic surgery in Oct-24/left side chest pain, SOB     TECHNIQUE:  IV contrast-enhanced multislice CT angiography is performed through the pulmonary arterial anatomy. 3D volume renderings are generated.  Dose reduction techniques were used. Dose information is transmitted to the ACR (American College of  Radiology) NRDR (National Radiology Data Registry) which includes the Dose Index Registry.     PATIENT STATED HISTORY:(As transcribed by Technologist)  Patient states to have left anterior and axillary chest pain, shortness of breath and a cough. He has a history of parathyroid adenoma surgery, done October 2024.      CONTRAST USED:  105cc of Isovue 370     FINDINGS:    VASCULATURE:  No visible pulmonary arterial thrombus or attenuation.    THORACIC AORTA:  No aneurysm or visible dissection.    LUNGS:  Minimal ground-glass densities are noted.  Atelectasis in the left lower lobe is noted.  This is related to elevated left hemidiaphragm.  MEDIASTINUM:  No adenopathy or mass.    JARRETT:  No mass or adenopathy.    CARDIAC:  Coronary arterial calcifications.  PLEURA:  No mass or effusion.    CHEST WALL:  No mass or axillary adenopathy.    LIMITED ABDOMEN:   Limited images of the upper abdomen are unremarkable.    BONES:  No bony lesion or fracture.    OTHER:  Negative.                     Impression   CONCLUSION:       No evidence of pulmonary embolus.  Elevated left hemidiaphragm and atelectasis in the left lower lobe.           LOCATION:  [facility]        Dictated by (CST): Osman Bhatt MD on 2/21/2025 at 11:29 AM             PROCEDURE:  XR CHEST AP PORTABLE  (CPT=71045)     TECHNIQUE:  AP chest radiograph was obtained.     COMPARISON:  None.     INDICATIONS:  left chest pain     PATIENT STATED HISTORY: (As transcribed by Technologist)  Pt. with left chest pain difficulty breathing. post op chest tube.           FINDINGS:  There is elevation of the left hemidiaphragm with atelectasis at the left lung base.  Heart size is within normal limits for portable technique.  Mediastinum and eddie are unremarkable.  Chest wall structures are unremarkable.                  Impression  CONCLUSION:  There is elevation left hemidiaphragm and atelectasis at left lung base.        LOCATION:                   Dictated by (CST): Bryce Reddy MD on 11/05/2024 at 6:01 PM      Finalized by (CST): Bryce Reddy MD on 11/05/2024 at 6:01 PM       Asthma Control Test:   (In The Last 4 Weeks)     Asthma Control Test Score: 12 points out of 25 points      14 or less  Asthma is very poorly controlled    15-19  Asthma is not as controlled as it could be   20-25  Asthma is under control    COPD assessment test (CAT) score: 30  (Symptom severity of COPD/ CAT score of< 10= low; 10-20= medium; 21-30= high;> 30= very high)           Impression:  The patient is a 54-year-old male who presents for evaluation of hoarseness and shortness of breath.  The patient underwent robotic assisted thoracoscopic resection of ectopic parathyroid on  resection para-aortic 10/30/2024.  He is noted to have left vocal cord paralysis suspect due to left recurrent laryngeal nerve involvement postoperatively and  has undergone injection with some residual bowing of vocal cord.  The patient has complaints of has shortness of breath that appears related to left diaphragmatic paralysis suspect due to phrenic nerve involvement.  Leading to symptoms of shortness of breath  Recurrent bronchitis, suspect due to aspiration of secretions with vocal cord dysfunction  Allergic rhinitis  Left lower lobe atelectasis due to left diaphragmatic paralysis/dysfunction  Elevated left hemidiaphragm suspect due to left diaphragmatic dysfunction/paralysis      Left vocal cord dysfunction suspect due to recurrent laryngeal nerve dysfunction  Hypercalcemia: Due to ectopic parathyroid adenoma status post robotic assisted thoracoscopic excision pathology showed parathyroid Hypercellular parathyroid, 430 mg   GERD  ADHD  Hypercholesterolemia  Hypothyroidism  Obesity class II Body mass index is 36.01 kg/m².  Snoring with Mallampati class III palate and class II obesity, the patient appears at risk for obstruct sleep apnea syndrome                Plan:    Breo Ellipta 100-25 mcg one puff daily   Albuterol HFA MDI 2 puffs 4 times daily as needed   Azelastine 137 mcg 1 puff twice daily as needed for congested and runny nose   Nasacort 2 puffs each nostril daily  Incentive Spirometry every 4 hours as possible   Check sniff test/fluoroscopy to rule out left diaphragmatic paralysis  Thoracic Surgery consult with Dr. Manny Bolanos for evaluation of left diaphragmatic plication versus diaphragmatic pacing etc.  If with diaphragmatic paralysis is noted on sniff test/fluoroscopy.  Patient wishes treatment at [facility] in [facility]  ENT follow up for reevaluation of hoarseness  GERD precautions   Schedule Home sleep study     Follow up:  3  months     Thank you for allowing me to participate in your patient care.    NAVEED Colbert MD, FACP, FCCP, FAASM - Pulmonary/Critical care/Sleep Medicine  Please contact our office if you have any questions or  concerns at [phone]    Note to the patient: The 21st Century Cures Act makes medical notes like these available to patients in the interest of transparency. However, be advised that this is a medical document. It is intended as peer to peer communication. It is written in medical language and may contain abbreviations or verbiage that are unfamiliar. It may appear blunt or direct. Medical documents are intended to carry relevant information, facts as evident, and clinical opinion of the practitioner.      Disclaimer: Components of this note were documented using voice recognition system and are subject to errors not corrected at proofreading. Contact the author of this note for any clarifications         [1]   Past Medical History:   Attention deficit hyperactivity disorder (ADHD)    COVID-19    fatigue    Disorder of thyroid    GERD (gastroesophageal reflux disease)    High cholesterol    Hyperlipidemia    Low testosterone    Thyroid disease    Visual impairment    glasses   [2]   Past Surgical History:  Procedure Laterality Date    Other surgical history      Parathyroidectomy      Rotator cuff repair Right 01/2023   [3] No Known Allergies  [4]   Current Outpatient Medications   Medication Sig Dispense Refill    testosterone cypionate 200 mg/mL Intramuscular Solution Inject 0.5 mL (100 mg total) into the muscle twice a week. Fill in 1 ml vials. 26 mL 1    gabapentin 100 MG Oral Cap Take 1 capsule (100 mg total) by mouth 3 (three) times daily.      ezetimibe 10 MG Oral Tab Take 1 tablet (10 mg total) by mouth nightly. 90 tablet 3    losartan 25 MG Oral Tab Take 1 tablet (25 mg total) by mouth daily. 90 tablet 1    EUTHYROX 25 MCG Oral Tab Take 1 tablet (25 mcg total) by mouth before breakfast. 90 tablet 1    ARMOUR THYROID 60 MG Oral Tab Take 1 tablet (60 mg total) by mouth daily. + 1 tablet (15 mg) by mouth daily (75 mg total daily dose)  E03.9 90 tablet 1    ARMOUR THYROID 15 MG Oral Tab Take 1 tablet (15 mg  total) by mouth daily. + 1 tablet (60 mg) by mouth daily (75 mg total daily dose) 90 tablet 1    pregabalin 50 MG Oral Cap Take 1 capsule (50 mg total) by mouth 2 (two) times daily.      celecoxib 100 MG Oral Cap Take 1 capsule (100 mg total) by mouth 2 (two) times daily with meals.      Needle, Disp, (BD DISP NEEDLE) 25G X 1\" Does not apply Misc USE AS DIRECTED TWICE WEEKLY 100 each 0    methylphenidate ER 20 MG Oral Capsule SR 24 Hr Take 1 capsule (20 mg total) by mouth every morning.      methylphenidate 5 MG Oral Tab Take 1-2 tablets (5-10 mg total) by mouth daily as needed. IN THE AFTERNOON      BD SYRINGE SLIP TIP 1 ML Does not apply Misc USE FOR TESTOSTERONE TWICE WEEKLY.      Carboxymeth-Cellulose-CitricAc (PLENITY) Oral Cap Take 3 capsules by mouth 2 (two) times daily before meals. 180 capsule 1    Cholecalciferol (VITAMIN D3) 25 MCG (1000 UT) Oral Cap Take 2 tablets by mouth daily. 5,000 also has k-2      Syringe/Needle, Disp, 25G X 1\" 1 ML Does not apply Misc Use to inject Testosterone as directed. 100 each 0    acetaminophen 500 MG Oral Tab Take 2 tablets (1,000 mg total) by mouth 3 (three) times daily.      ibuprofen 600 MG Oral Tab Take 1 tablet (600 mg total) by mouth.      hydroCHLOROthiazide 12.5 MG Oral Cap Take 1 capsule (12.5 mg total) by mouth daily. 90 capsule 0    oxyCODONE 5 MG Oral Tab Take 1 tablet (5 mg total) by mouth every 4 (four) hours as needed for Pain.     [5]   Social History  Socioeconomic History    Marital status:    Occupational History     Employer: [employer]     Comment: [employer]   Tobacco Use    Smoking status: Never    Smokeless tobacco: Never   Vaping Use    Vaping status: Never Used   Substance and Sexual Activity    Alcohol use: Yes     Alcohol/week: 2.0 standard drinks of alcohol     Types: 2 Standard drinks or equivalent per week    Drug use: Never   Other Topics Concern    Caffeine Concern Yes     Comment: coffee- 3 cups a  day,Sugar free Redbull - couple a week, Black tea Once a day     Social Drivers of Health     Food Insecurity: No Food Insecurity (10/30/2024)    Received from [facility]    Hunger Vital Sign     Worried About Running Out of Food in the Last Year: Never true     Ran Out of Food in the Last Year: Never true   Transportation Needs: Unknown (10/30/2024)    Received from [facility]    PRAPARE - Transportation     Lack of Transportation (Non-Medical): No   Housing Stability: Unknown (10/30/2024)    Received from [facility]    Housing Stability Vital Sign     Unable to Pay for Housing in the Last Year: No     Homeless in the Last Year: No   [6]   Family History  Problem Relation Age of Onset    Thyroid disease Mother     Heart Disorder Father         Cardiac bypass

## 2025-05-01 ENCOUNTER — OFFICE VISIT (OUTPATIENT)
Dept: SPEECH THERAPY | Facility: HOSPITAL | Age: 55
End: 2025-05-01
Payer: COMMERCIAL

## 2025-05-01 ENCOUNTER — HOSPITAL ENCOUNTER (OUTPATIENT)
Dept: GENERAL RADIOLOGY | Age: 55
Discharge: HOME | End: 2025-05-01
Attending: INTERNAL MEDICINE
Payer: COMMERCIAL

## 2025-05-01 DIAGNOSIS — J98.6 DIAPHRAGM PARALYSIS: ICD-10-CM

## 2025-05-01 PROCEDURE — 92507 TX SP LANG VOICE COMM INDIV: CPT

## 2025-05-01 PROCEDURE — 76000 FLUOROSCOPY <1 HR PHYS/QHP: CPT | Performed by: INTERNAL MEDICINE

## 2025-05-01 NOTE — PROGRESS NOTES
Patient: Hany Fontanez (54 year old, male) Referring Provider:  Insurance:   Diagnosis: L TVC paralysis and dysphonia Physician Nonstaff  [employer]   Precautions:  Other (use comment) Next MD visit:  FATMATA KELLER PPO    No data recorded Referral Information:    Date of Evaluation: Req: 0, Auth: 0, Exp:     04/10/25 POC Auth Visits:  8       Today's Date   5/1/2025        Treatment Day: 3    Subjective  Patient arrived on time to session. Patient participated activley in therapeutic tasks and reports completion of HEP within the last few weeks. Patient noted to have little to no L diaphragm movement per sniff test taken today.       Pain: 0/10     Objective  See goals below.     Goals: (to be met in 8 visits)        Patient will name and report use of appropriate vocal hygiene strategies given min verbal and visual cues.   Increased H2O intake and decreased maladaptive straining of voice    Progress: Progressing   2.   Patient will complete vocal function and breath support exercises with 85% accuracy given mod verbal and visual cues.   85% given mod verbal and visual cues   Progress:    Progressing   3.   Patient will demonstrate diaphragmatic breathing at rest in 9/10 attempts given mod verbal and visual cues.   10/10 independently within confounds of diaphragm paralysis   Progress: Progressing   4.   Patient will demonstrate adequate breath support for production of reading and conversation within 80% of attempts given mod verbal and visual cues.   80% given mod verbal and visual cues    Progress: Progressing   5.  Patient will independently complete home exercise program (i.e., vocal function and breath support exercises) at least 6 times per week across 6 weeks.   Increased completion of HEP to daily    Progress: Progressing   6.   Patient will indicate reduced voice handicap after completing vocal function and breath support exercises measured by improved Voice Handicap Index score (baseline 75/120).   Goal in  progress.     Progress: Progressing                      Assessment  Patient presents to speech therapy with dysphonia c/b breathiness, decreased coordination of respiration and phonation, decreased breath support, laryngeal muscle tension and strain. Functional deficits include but are not limited to difficulty communicating within social and occupational tasks 2/2 dysphonia. Patient demonstrates improved accuracy and form for vocal hygiene strategies. Continues to benefit from skilled cueing and training to facilitate strength of phonation for reading and conversation. Continued intervention is medically necessary.    Plan  Continue speech-language therapy targeting voice.    HEP  VFEs, vocal hygiene, deep diphragmatic breathing, functional phrases     Charges  09912    Total Treatment Time: 40 min

## 2025-05-08 ENCOUNTER — OFFICE VISIT (OUTPATIENT)
Dept: SPEECH THERAPY | Facility: HOSPITAL | Age: 55
End: 2025-05-08
Payer: COMMERCIAL

## 2025-05-08 PROCEDURE — 92507 TX SP LANG VOICE COMM INDIV: CPT

## 2025-05-08 NOTE — PROGRESS NOTES
Patient: Hany Fontanez (54 year old, male) Referring Provider:  Insurance:   Diagnosis: L TVC paralysis and dysphonia Physician Nonstaff  [employer]   Precautions:  Other (use comment) Next MD visit:  FATMATA KELLER PPO    No data recorded Referral Information:    Date of Evaluation: Req: 0, Auth: 0, Exp:     04/10/25 POC Auth Visits:  8       Today's Date   5/8/2025        Treatment Day: 4    Subjective  Patient arrived on time to session. Patient participated actively in therapeutic tasks and reports completion of HEP within the last week. Patient to have consult with Dr. Angel Robertson on 5/14 to determine candidacy for nerve graft procedure.       Pain: 0/10     Objective  See goals below.        Goals: (to be met in 8 visits)        Patient will name and report use of appropriate vocal hygiene strategies given min verbal and visual cues.   Patient reports use of vocal hygiene strategies within last week.    Progress: Progressing   2.   Patient will complete vocal function and breath support exercises with 85% accuracy given mod verbal and visual cues.   85% given min verbal and visual cues   Progress:    Progressing   3.   Patient will demonstrate diaphragmatic breathing at rest in 9/10 attempts given mod verbal and visual cues.   10/10 independently within confounds of diaphragm paralysis   Progress: Progressing   4.   Patient will demonstrate adequate breath support for production of reading and conversation within 80% of attempts given mod verbal and visual cues.   80% given min verbal and visual cues    Progress: Progressing   5.  Patient will independently complete home exercise program (i.e., vocal function and breath support exercises) at least 6 times per week across 6 weeks.   Increased completion of HEP to daily    Progress: Progressing   6.   Patient will indicate reduced voice handicap after completing vocal function and breath support exercises measured by improved Voice Handicap Index score (baseline  75/120).   Goal in progress.     Progress: Progressing                  Assessment  Patient presents to speech therapy with dysphonia c/b breathiness, decreased coordination of respiration and phonation, decreased breath support, laryngeal muscle tension and strain. Functional deficits include but are not limited to difficulty communicating within social and occupational tasks 2/2 dysphonia. Patient demonstrates awareness and accuracy with completion of HEP. Patient continues to be limited by breath support. Continued intervention is medically necessary to support progress toward goals and phonation for daily conversation and work related tasks.    Plan  Continue speech-language therapy targeting voice.    HEP  VFEs, vocal hygiene, deep diphragmatic breathing, functional phrases     Charges  [id number]    Total Treatment Time: 45 min

## 2025-05-09 ENCOUNTER — TELEPHONE (OUTPATIENT)
Age: 55
End: 2025-05-09

## 2025-05-09 NOTE — TELEPHONE ENCOUNTER
This form is for JAYLIN screen. He already has a sleep study ordered by pulm. Does he need an actual pre-op evaluation?

## 2025-05-09 NOTE — TELEPHONE ENCOUNTER
Form received from  pre admission testing      Surgery date:5-19-25  Surgeon's name:Luis Miguel Hankins, Manny MASON MD  Ph#  Fax#  Procedure:FLEXI BLE BRONCHOSCOPY, ROBOTI C ASSI STED THORACOSCOPI C SURGERY, LEFT DI APHRAGM PLI RUFINA ON, POSSI BLE OPEN      Form placed in Dr Lamin Persaud  pre op folder for review.

## 2025-05-12 ENCOUNTER — APPOINTMENT (OUTPATIENT)
Dept: SPEECH THERAPY | Facility: HOSPITAL | Age: 55
End: 2025-05-12
Payer: COMMERCIAL

## 2025-05-12 NOTE — TELEPHONE ENCOUNTER
Patient stated he has had 3 surgeries already without a sleep study. He stated his surgeons office is not requesting a pre op. Patient will call pre admission testing to check why they are requesting a sleep study. His pulmonologist ordered an at home sleep study and patient hasn't had a chance to get it done due to travel. Paperwork sent to scanning.

## 2025-05-12 NOTE — TELEPHONE ENCOUNTER
Patient called back saying that he spoke with his surgeon and he does not need pre op evaluation. Form for JAYLIN screen was just if patient wanted to have that done. Patient would also like  to know that he has an evaluation this Wednesday with Dr.matthew Robertson for phrenic nerve grafting.       , patient does not need a pre op evaluation and wants to let you know that he is seeing Dr.Matthew Robertson for phrenic nerve grafting.

## 2025-05-20 ENCOUNTER — TELEPHONE (OUTPATIENT)
Dept: SPEECH THERAPY | Facility: HOSPITAL | Age: 55
End: 2025-05-20

## 2025-05-22 ENCOUNTER — APPOINTMENT (OUTPATIENT)
Dept: SPEECH THERAPY | Facility: HOSPITAL | Age: 55
End: 2025-05-22
Payer: COMMERCIAL

## 2025-05-22 ENCOUNTER — TELEPHONE (OUTPATIENT)
Dept: SPEECH THERAPY | Facility: HOSPITAL | Age: 55
End: 2025-05-22

## 2025-05-27 ENCOUNTER — OFFICE VISIT (OUTPATIENT)
Dept: SPEECH THERAPY | Facility: HOSPITAL | Age: 55
End: 2025-05-27
Payer: COMMERCIAL

## 2025-05-27 PROCEDURE — 92507 TX SP LANG VOICE COMM INDIV: CPT

## 2025-05-27 NOTE — PROGRESS NOTES
Patient: Hany Fontanez (54 year old, male) Referring Provider:  Insurance:   Diagnosis: L TVC paralysis and dysphonia Physician Nonstaff  [employer]   Precautions:  Other (use comment) Next MD visit:  FATMATA KELLER PPO    No data recorded Referral Information:    Date of Evaluation: Req: 0, Auth: 0, Exp:     04/10/25 POC Auth Visits:  8       Today's Date   5/27/2025        Treatment Day: 5    Subjective  Patient arrived on time to session. Patient participated actively in therapeutic tasks and reports completion of HEP within the last week. Patient reports improved breathing and phonation over last few weeks.       Pain: 0/10     Objective  See goals below.     Goals: (to be met in 8 visits)        Patient will name and report use of appropriate vocal hygiene strategies given min verbal and visual cues.   Patient reports use of vocal hygiene strategies within few weeks.    Progress: Progressing   2.   Patient will complete vocal function and breath support exercises with 85% accuracy given mod verbal and visual cues.   90% given mod verbal and visual cues   Progress:    Progressing   3.   Patient will demonstrate diaphragmatic breathing at rest in 9/10 attempts given mod verbal and visual cues.   10/10 independently within confounds of diaphragm paralysis   Progress: Progressing   4.   Patient will demonstrate adequate breath support for production of reading and conversation within 80% of attempts given mod verbal and visual cues.   85% given mod verbal and visual cues    Progress: Progressing   5.  Patient will independently complete home exercise program (i.e., vocal function and breath support exercises) at least 6 times per week across 6 weeks.   Increased completion of HEP to daily    Progress: Progressing   6.   Patient will indicate reduced voice handicap after completing vocal function and breath support exercises measured by improved Voice Handicap Index score (baseline 75/120).   Goal in progress.      Progress: Progressing                  Assessment  Patient presents to speech therapy with dysphonia c/b breathiness, decreased coordination of respiration and phonation, decreased breath support, laryngeal muscle tension and strain. Functional deficits include but are not limited to difficulty communicating within social and occupational tasks 2/2 dysphonia. Patient demonstrates improved vocal quality. Patient continues to require support to facilitate improved breath support and phonation without strain. He would benefit from 1 additional visit prior to surgery. Continued intervention is medically necessary.    Plan  Continue speech-language therapy targeting voice.    HEP  VFEs, vocal hygiene, deep diphragmatic breathing, functional phrases     Charges  [id number]    Total Treatment Time: 45 min

## 2025-05-29 DIAGNOSIS — I10 PRIMARY HYPERTENSION: ICD-10-CM

## 2025-05-29 DIAGNOSIS — E78.00 HYPERCHOLESTEREMIA: ICD-10-CM

## 2025-05-30 RX ORDER — LOSARTAN POTASSIUM 25 MG/1
25 TABLET ORAL DAILY
Qty: 90 TABLET | Refills: 1 | OUTPATIENT
Start: 2025-05-30

## 2025-05-30 RX ORDER — EZETIMIBE 10 MG/1
10 TABLET ORAL NIGHTLY
Qty: 90 TABLET | Refills: 3 | OUTPATIENT
Start: 2025-05-30

## 2025-06-04 ENCOUNTER — TELEPHONE (OUTPATIENT)
Dept: PHYSICAL THERAPY | Facility: HOSPITAL | Age: 55
End: 2025-06-04

## 2025-06-09 ENCOUNTER — OFFICE VISIT (OUTPATIENT)
Dept: SPEECH THERAPY | Facility: HOSPITAL | Age: 55
End: 2025-06-09
Payer: COMMERCIAL

## 2025-06-09 PROCEDURE — 92507 TX SP LANG VOICE COMM INDIV: CPT

## 2025-06-09 NOTE — PROGRESS NOTES
Patient: Hany Fontanez (54 year old, male) Referring Provider:  Insurance:   Diagnosis: L TVC paralysis and dysphonia Physician Nonstaff  [employer] [place]   Precautions:  Other (use comment) Next MD visit:  N/A    No data recorded Referral Information:    Date of Evaluation: Req: 0, Auth: 0, Exp:     04/10/25 POC Auth Visits:  8     Discharge Summary  Pt has attended 6 visits in Speech Therapy.   Today's Date   6/9/2025        Treatment Day: 6    Dear Kelly Clay,  This letter is to inform you of Hany Fontanez's progress in speech-language therapy.    Since his initial evaluation, Hany has attended 6 sessions. Therapy sessions have targeted vocal quality and breath support. A home exercise program (HEP) addressing breath support and vocal function exercises has been provided and completed consistently. During this treatment period, Hany has demonstrated improved independence with completion of VFEs and intensity of phonation given compensatory strategies. As he is independent with completion of HEP, it is recommended that he be discharged from speech therapy at this time. It is also recommended that he re-consult with ST given need following surgery.    Subjective  Patient arrived on time to session. Patient participated actively in therapeutic tasks and reports completion of HEP within the last week. Scheduled for nerve graft surgery on 8/15.       Pain: 0/10     Objective  See goals below.        Goals: (to be met in 8 visits)        Patient will name and report use of appropriate vocal hygiene strategies given min verbal and visual cues.   Patient reports use of vocal hygiene strategies within few weeks.    Progress: Goal met.   2.   Patient will complete vocal function and breath support exercises with 85% accuracy given mod verbal and visual cues.   90% given min verbal and visual cues   Progress:    Goal met.   3.   Patient will demonstrate diaphragmatic breathing at rest in 9/10 attempts given  mod verbal and visual cues.   10/10 independently within confounds of diaphragm paralysis   Progress: Goal met.   4.   Patient will demonstrate adequate breath support for production of reading and conversation within 80% of attempts given mod verbal and visual cues.   85% given mod verbal and visual cues    Progress: Goal met.   5.  Patient will independently complete home exercise program (i.e., vocal function and breath support exercises) at least 6 times per week across 6 weeks.   Increased completion of HEP to daily    Progress: Goal met.        Assessment  Patient presented to speech therapy with dysphonia c/b breathiness, decreased coordination of respiration and phonation, decreased breath support, laryngeal muscle tension and strain. Throughout plan of care, patient has demonstrated improved intensity of phonation and ability to utilize limited breath support effectively to sustain speech. Patient continues to require modifications to produce functional voice for daily activities. As patient is independent with completion of HEP, it is recommended that he be discharged from speech therapy at this time. Patient was also advised to re-consult with ST upon completion of surgery given need.    Plan  Discontinue speech therapy with continued compliance to HEP. Re-consult given need following surgery.    HEP  VFEs, vocal hygiene, deep diphragmatic breathing, functional phrases     Charges  [id number]    Total Treatment Time: 45 min    Patient/Family/Caregiver was advised of these findings, precautions, and treatment options and has agreed to actively participate in planning and for this course of care.    Thank you for your referral. If you have any questions, please contact me at Dept: [phone].    Sincerely,  Electronically signed by therapist: Shellie Dey, SLP

## 2025-06-23 ENCOUNTER — TELEPHONE (OUTPATIENT)
Facility: CLINIC | Age: 55
End: 2025-06-23

## 2025-06-23 ENCOUNTER — TELEPHONE (OUTPATIENT)
Age: 55
End: 2025-06-23

## 2025-06-23 DIAGNOSIS — E03.9 HYPOTHYROIDISM, UNSPECIFIED TYPE: Primary | ICD-10-CM

## 2025-06-23 DIAGNOSIS — Z01.818 PRE-OPERATIVE CLEARANCE: Primary | ICD-10-CM

## 2025-06-23 RX ORDER — LEVOTHYROXINE SODIUM 25 MCG
25 TABLET ORAL
Qty: 90 TABLET | Refills: 1 | Status: SHIPPED | OUTPATIENT
Start: 2025-06-23

## 2025-06-23 NOTE — TELEPHONE ENCOUNTER
RN phoned pharmacy to review- going through with out Prior Authorization for $127 for 90 day.     For 30 day supply this will be -$40    States we will need to order and will come in tomorrow.    RN phoned patient to review above- no answer left message.

## 2025-06-23 NOTE — TELEPHONE ENCOUNTER
Attempted to call patient. Left a voice message to call the office and ask for a nurse regarding Cardiology referral information.

## 2025-06-23 NOTE — TELEPHONE ENCOUNTER
Dr. Persaud - patient needs Cardiology/Cardiac clearance for surgery, does he need to see you first for referral or can you just give referral? Pended if ok

## 2025-06-23 NOTE — TELEPHONE ENCOUNTER
Last office note: Currently on [place] 75mg + Synthroid 25mcg with biochemical euthyroidism  - TFTs q6 months    Endocrine refill protocol for medications for hypothyroidism and hyperthyroidism    Protocol Criteria:  PASSED Reason: N/A    If all below requirements are met, send a 90-day supply with 1 refill per provider protocol.    Verify appointment with Endocrinology completed in the last 12 months or scheduled in the next 6 months.    Normal TSH result in the past 12 months   Review recent telephone encounters and mychart communications with patient to ensure a dose change has not occurred since last office visit that was not updated in the medication history list     Last completed office visit:2/7/2025 Geraldine Kaba DO   Last completed telemed visit: Visit date not found  Next scheduled Follow up:   Future Appointments   Date Time Provider Department Center   7/10/2025  3:45 PM Geraldine Kaba DO [id number]   7/17/2025  5:30 PM Lamin Persaud MD [facility]      Last TSH result:   TSH   Date Value Ref Range Status   01/31/2025 2.124 0.550 - 4.780 uIU/mL Final   06/18/2021 3.330 mIU/mL Final     Received call from patient, due to Euthyrox being discontinued by the , patient is wanting to try for Synthroid.    Reviewed dosing as above is correct. Sent Synthroid 25 mcg per protocol.

## 2025-06-23 NOTE — TELEPHONE ENCOUNTER
Future Appointments   Date Time Provider Department Center   7/17/2025  5:30 PM Lamin Persaud MD [facility]     Patient schedule pre op w/Dr. Lamin Persaud on 7/17/25-he also needs cardiac clearance per the surgeon but does not have a cardiologist-does Dr. Lamin Persaud need to see him in order to refer to cardiology or can he just give patient info to call to make appointment with cardio?    Patient is having surgeon fax us surgical information now.

## 2025-06-27 ENCOUNTER — LAB ENCOUNTER (OUTPATIENT)
Dept: LAB | Facility: HOSPITAL | Age: 55
End: 2025-06-27
Attending: STUDENT IN AN ORGANIZED HEALTH CARE EDUCATION/TRAINING PROGRAM
Payer: COMMERCIAL

## 2025-06-27 DIAGNOSIS — E03.9 HYPOTHYROIDISM, UNSPECIFIED TYPE: ICD-10-CM

## 2025-06-27 DIAGNOSIS — E21.0 PRIMARY HYPERPARATHYROIDISM (HCC): ICD-10-CM

## 2025-06-27 DIAGNOSIS — Z79.890 LONG-TERM CURRENT USE OF TESTOSTERONE REPLACEMENT THERAPY: ICD-10-CM

## 2025-06-27 LAB
ALBUMIN SERPL-MCNC: 4.8 G/DL (ref 3.2–4.8)
ALBUMIN/GLOB SERPL: 2 {RATIO} (ref 1–2)
ALP LIVER SERPL-CCNC: 62 U/L (ref 45–117)
ALT SERPL-CCNC: 23 U/L (ref 10–49)
ANION GAP SERPL CALC-SCNC: 8 MMOL/L (ref 0–18)
AST SERPL-CCNC: 26 U/L (ref ?–34)
BILIRUB SERPL-MCNC: 0.5 MG/DL (ref 0.3–1.2)
BUN BLD-MCNC: 15 MG/DL (ref 9–23)
CALCIUM BLD-MCNC: 9.3 MG/DL (ref 8.7–10.6)
CHLORIDE SERPL-SCNC: 105 MMOL/L (ref 98–112)
CO2 SERPL-SCNC: 28 MMOL/L (ref 21–32)
COMPLEXED PSA SERPL-MCNC: 2.22 NG/ML (ref ?–4)
CREAT BLD-MCNC: 1.33 MG/DL (ref 0.7–1.3)
EGFRCR SERPLBLD CKD-EPI 2021: 64 ML/MIN/1.73M2 (ref 60–?)
ERYTHROCYTE [DISTWIDTH] IN BLOOD BY AUTOMATED COUNT: 12.9 %
FASTING STATUS PATIENT QL REPORTED: YES
GLOBULIN PLAS-MCNC: 2.4 G/DL (ref 2–3.5)
GLUCOSE BLD-MCNC: 94 MG/DL (ref 70–99)
HCT VFR BLD AUTO: 50.6 % (ref 39–53)
HGB BLD-MCNC: 17.4 G/DL (ref 13–17.5)
MCH RBC QN AUTO: 31.1 PG (ref 26–34)
MCHC RBC AUTO-ENTMCNC: 34.4 G/DL (ref 31–37)
MCV RBC AUTO: 90.5 FL (ref 80–100)
OSMOLALITY SERPL CALC.SUM OF ELEC: 293 MOSM/KG (ref 275–295)
PLATELET # BLD AUTO: 229 10(3)UL (ref 150–450)
POTASSIUM SERPL-SCNC: 4.2 MMOL/L (ref 3.5–5.1)
PROT SERPL-MCNC: 7.2 G/DL (ref 5.7–8.2)
PTH-INTACT SERPL-MCNC: 64.2 PG/ML (ref 18.5–88)
RBC # BLD AUTO: 5.59 X10(6)UL (ref 4.3–5.7)
SODIUM SERPL-SCNC: 141 MMOL/L (ref 136–145)
T3 SERPL-MCNC: 1 NG/ML (ref 0.6–1.81)
T4 FREE SERPL-MCNC: 0.9 NG/DL (ref 0.8–1.7)
TESTOST SERPL-MCNC: 1027.69 NG/DL (ref 187.72–684.19)
TSI SER-ACNC: 3.79 UIU/ML (ref 0.55–4.78)
WBC # BLD AUTO: 7.5 X10(3) UL (ref 4–11)

## 2025-06-27 PROCEDURE — 84403 ASSAY OF TOTAL TESTOSTERONE: CPT

## 2025-06-27 PROCEDURE — 85027 COMPLETE CBC AUTOMATED: CPT

## 2025-06-27 PROCEDURE — 84439 ASSAY OF FREE THYROXINE: CPT

## 2025-06-27 PROCEDURE — 83970 ASSAY OF PARATHORMONE: CPT

## 2025-06-27 PROCEDURE — 36415 COLL VENOUS BLD VENIPUNCTURE: CPT

## 2025-06-27 PROCEDURE — 84443 ASSAY THYROID STIM HORMONE: CPT

## 2025-06-27 PROCEDURE — 84480 ASSAY TRIIODOTHYRONINE (T3): CPT

## 2025-06-27 PROCEDURE — 80053 COMPREHEN METABOLIC PANEL: CPT

## 2025-07-10 ENCOUNTER — TELEPHONE (OUTPATIENT)
Dept: INTERNAL MEDICINE CLINIC | Facility: CLINIC | Age: 55
End: 2025-07-10

## 2025-07-10 ENCOUNTER — TELEMEDICINE (OUTPATIENT)
Facility: CLINIC | Age: 55
End: 2025-07-10
Payer: COMMERCIAL

## 2025-07-10 DIAGNOSIS — R79.89 LOW TESTOSTERONE IN MALE: Primary | ICD-10-CM

## 2025-07-10 DIAGNOSIS — Z98.890 H/O PARATHYROIDECTOMY: ICD-10-CM

## 2025-07-10 DIAGNOSIS — Z90.89 H/O PARATHYROIDECTOMY: ICD-10-CM

## 2025-07-10 DIAGNOSIS — E21.0 PRIMARY HYPERPARATHYROIDISM (HCC): ICD-10-CM

## 2025-07-10 DIAGNOSIS — E03.9 HYPOTHYROIDISM, UNSPECIFIED TYPE: ICD-10-CM

## 2025-07-10 PROCEDURE — 98006 SYNCH AUDIO-VIDEO EST MOD 30: CPT | Performed by: STUDENT IN AN ORGANIZED HEALTH CARE EDUCATION/TRAINING PROGRAM

## 2025-07-10 RX ORDER — LEVOTHYROXINE SODIUM 25 MCG
25 TABLET ORAL
Qty: 90 TABLET | Refills: 1 | Status: SHIPPED | OUTPATIENT
Start: 2025-07-10

## 2025-07-10 NOTE — TELEPHONE ENCOUNTER
Patient faxed over pre op order requests  1 week ago  Asking is the office received this fax.  Patient states this paperwork said he needs EKG, fasting labs, venous US.  Asking to have orders placed if possible so he can complete these prior to upcoming appointment.     States he is having surgery in [place]  Surgery is 8/15/25  Requesting paperwork sent 2 weeks before surgery     Future Appointments   Date Time Provider Department Center   7/17/2025  5:30 PM Lamin Persaud MD [facility]

## 2025-07-10 NOTE — TELEPHONE ENCOUNTER
EKG will be done in the office. Labs and other tests will be done after visit and clinical assessment.

## 2025-07-10 NOTE — PROGRESS NOTES
Return Office Visit      Today's date: 7/10/25    Patient verbally consents to a Video/Telephone service for this visit.  Patient understands and accepts financial responsibility for any deductible, co-insurance and/or co-pays associated with this service.      HISTORY OF PRESENT ILLNESS:  Hany Fontanez is a 55 year old male who presents for follow up for hypothyroidism, hypogonadism and primary hyperparathyroidism.    Initial consult April 2022:  HyperCa hx:  Patient states that he has been aware of mildly elevated calcium for the past few years, with recent elevated PTH brought to his attention.  Denies any thiazides, lithium, excess dairy dairy.  Is physically active with overall good energy.   Denies any history of fractures or stones.  Denies any familial hypercalcemia.  Is interested in surgery if it can definitely reverse the hyperPTH.     Reviewed labs:  .9  Ca 10.7-11.1 (2022)  Vit D n/a  Cr wnl  Urine Ca n/a  Hx of fx: no  Hx of stones: possibly had a small stone 'years ago'     Hypogonadism hx:  Started in [place] >10 years ago, seen a 'hormone doctor'. Previously very physically active and felt sx of hypogonadism including weight gain and fatigue. Started on gels, then switched to injections.  At one point his testosterone was >1000, which he was cautioned about. Knows all the risks associated with testosterone replacement. Gets phlebotomized frequently for polycythemia.      Currently takes IM testosterone 100mg twice a week   Also getting anastrozole 0.25mg twice a week (from his [place] doctor). Understands that I will not be prescribing it.  Most recent AM fasting testosterone 927.9, Hbg 17.5, hematocrit 51.1     Hypothyroid hx:  Been on [place] 1 grain daily, for >10 years now.   Feels better on this than levothyroxine.  Most recent TFTs wnl.    Interim hx:  April 2022 - urine Ca elevated at 558  May 2022 - DXA showed normal BMD  June 2022 - Sestamibi did not identify any parathyroid  adenoma.; saw ENT  June 2022 - thyroid US showing no nodules (ordered by ENT Dr Eng); plan is to repeat PTH again  July labs show mildly low fT4. Vit D at goal. H&H elevated, pt gets phlebotomy. Testosterone level still in process. Getting IM Testosterone 100mg twice a week.  Getting phlebotomized every couple of months at a lab in [place].  July 2022 - [place] at 3/4 grain, fT4 0.7, TSH 3.36.     Jan 2023 visit  8/2022 - repeat .9, Ca 11.1; pt will be following up with another ENT  1/2023 - total T 838, TSH 2.55, TT3 120, fT4 0.7, H&H 18.3/54.7; he states it used to be higher but he would do blood donations, hasn't done one recently, doesn't want to decrease dose of T  Currently on [place] 75mg daily; takes on its own in the morning without other medications  Currently IM Test 100mg twice a week  Just had R shoulder surgery, in [place]    July 2023 visit  Feels well on current TRT; to prevent polycythemia, he is donating blood q6-8 weeks. States he'll need a doctor's note if he hypothetically  needs to donate more freq (eg q4 weeks)  Started on Wegov[place] with medical weight management clinic; lost some weight then plateaued  Feels well on current [place] dose as well  Still traveling a lot for work; stays physically active    Feb 2024 11/2023 - PTH 69, vit D 18, Ca 12.0 -- vit D repleted; recommended to discuss with ENT again given Ca rise  12/2023 - fT4 0.7, TT3 113, TSH 2.59, PSA 2.0, vit D 36 -- remains on [place] 90mg (new brand from pharmacy, trying to see how it goes before dose increase)  1/2024 -  total Testosterone 686, Estradiol 32, H&H 16.7/50.5   Has been traveling a lot for work, a lot more responsibilities at work  Has seen Dr. Rojas (ENT) office but still not able to localize (4D CT) parathyroid adenoma    Aug 2024  -Remains on [place] 90mg daily, not taking extra. Recent TSH was elevated and pt feels this may be a lab error as he has been on a stable dose for many years.   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 with successful control of hypercalcemia/hyperparathyroidism, however did have some possible laryngeal nerve damage for which he is following with thoracic surgery/ENT  -Continuing on the same testosterone, asking about being put on anastrozole which a previous provider in [place] had put him on  -Insurance now preferring Euthyrox over Synthroid    July 2025  -Still with vocal hoarseness and diaphragmatic paralysis, planning for corrective surgery in [place] in August with specialist  -Recently euthyroid in June 2025 on [place] 75mg daily + Synthroid 25mcg  -Parathyroid hormone/calcium remains normal post-operatively  -T levels elevated on recent check, pt states he had labs drawn the day after injecting his medication - blood counts/PSA stable          CURRENT MEDICATION:    Current Outpatient Medications   Medication Sig Dispense Refill    SYNTHROID 25 MCG Oral Tab Take 1 tablet (25 mcg total) by mouth before breakfast. 90 tablet 1    Syringe/Needle, Disp, 25G X 1\" 1 ML Does not apply Misc Use to inject Testosterone as directed. 100 each 0    fluticasone furoate-vilanterol (BREO ELLIPTA) 100-25 MCG/ACT Inhalation Aerosol Powder, Breath Activated Inhale 1 puff into the lungs daily. 1 each 8    albuterol 108 (90 Base) MCG/ACT Inhalation Aero Soln Inhale 2 puffs into the lungs every 6 (six) hours as needed for Wheezing. inhale 2 puff by inhalation route  every 4 - 6 hours as needed 1 each 3    testosterone cypionate 200 mg/mL Intramuscular Solution Inject 0.5 mL (100 mg total) into the muscle twice a week. Fill in 1 ml vials. 26 mL 1    ezetimibe 10 MG Oral Tab Take 1 tablet (10 mg total) by mouth nightly. 90 tablet 3    losartan  25 MG Oral Tab Take 1 tablet (25 mg total) by mouth daily. 90 tablet 1    ARMOUR THYROID 60 MG Oral Tab Take 1 tablet (60 mg total) by mouth daily. + 1 tablet (15 mg) by mouth daily (75 mg total daily dose)  E03.9 90 tablet 1    ARMOUR THYROID 15 MG Oral Tab Take 1 tablet (15 mg total) by mouth daily. + 1 tablet (60 mg) by mouth daily (75 mg total daily dose) 90 tablet 1    Needle, Disp, (BD DISP NEEDLE) 25G X 1\" Does not apply Misc USE AS DIRECTED TWICE WEEKLY 100 each 0    methylphenidate ER 20 MG Oral Capsule SR 24 Hr Take 1 capsule (20 mg total) by mouth every morning.      methylphenidate 5 MG Oral Tab Take 1-2 tablets (5-10 mg total) by mouth daily as needed. IN THE AFTERNOON      BD SYRINGE SLIP TIP 1 ML Does not apply Misc       Cholecalciferol (VITAMIN D3) 25 MCG (1000 UT) Oral Cap Take 2 tablets by mouth in the morning. 5,000 also has k-2.         Endocrine Medications            SYNTHROID 25 MCG Oral Tab    ARMOUR THYROID 60 MG Oral Tab    ARMOUR THYROID 15 MG Oral Tab            ALLERGY:  No Known Allergies      PAST MEDICAL, SOCIAL AND FAMILY HISTORY:  See past medical history marked as reviewed.  See past surgical history marked as reviewed.  See past family history marked as reviewed.  See past social history marked as reviewed.      REVIEW OF SYSTEMS:   Ten point review of systems has been performed and is otherwise negative and/or non-contributory, except as described above.      PHYSICAL EXAM- limited due to telemedicine encounter    Constitutional Not in acute distress  Pulmonary: no wheezing heard, no coughing on the phone. Speaking in full sentences.  Neurological:  Alert and oriented to person, place and time.   Psychiatric: Normal affect, mood and behavior appropriate      DATA:     US THYROID (CPT=76536)    Result Date: 6/9/2022  CONCLUSION:  Heterogeneous thyroid gland.       Dictated by (CST): Mitch Conway MD on 6/09/2022 at 4:08 PM     Finalized by (CST): Mitch Conway MD on 6/09/2022 at 4:09  PM       PROCEDURE:  NM PARATHYROID SPECT/CT (CPT=78072)       COMPARISON:  None.       INDICATIONS:  E21.0 Primary hyperparathyroidism (HCC)       TECHNIQUE:  The patient was administered 297 uCi I-123 by mouth, and the patient returned to the department 3 hours later. At that time, a total of 25.0 mCi Tc99m sestamibi was injected IV, and dual-photon acquisition was performed. A high resolution   large field-of-view series of images was then obtained of the neck and chest region at 30 minutes and 3 hours post injection of the sestamibi. I-123 pinhole images were computer subtracted from the sestamibi pinhole images on a dedicated nuclear medicine    workstation. Additional dedicated SPECT images were taken with concurrent CT scan for both anatomical localization as well as attenuation correction. Scan was reformatted into multi-planar reconstructions on a dedicated workstation.         FINDINGS:   SPECT/CT Imaging:  No thyroid nodule or mass.       I-123:  Uniform activity within a normal-sized thyroid gland       SESTAMIBI:  Uniform activity within a normal-sized thyroid gland with no mismatched activity to suggest parathyroid adenoma.  Normal distribution within soft tissues, including salivary glands.       3 HOUR WASHOUT:  No evidence for ectopic sestamibi to suggest ectopic parathyroid adenoma.                Normal washout from thyroid gland after 3 hours.         Impression   CONCLUSION:  No evidence of parathyroid adenoma.       Dictated by (CST): Mitch Conway MD on 6/01/2022 at 2:22 PM       Finalized by (CST): Mitch Conway MD on 6/01/2022 at 2:24 PM        PROCEDURE:  XR DEXA BONE DENSITOMETRY (CPT=77080)       COMPARISON:  None.       INDICATIONS:    E21.0 Primary hyperparathyroidism (HCC)       PATIENT STATED HISTORY: (As transcribed by Technologist)  Hyperparathyoidism.            LUMBAR SPINE ANALYSIS RESULTS:       Bone mineral density (BMD) (g/cm2):  1.001     Lumbar T-Score:  -0.8       % young  normals:  92       % age matched controls:  95       Change from prior spine examination:  n/a                TOTAL HIP ANALYSIS RESULTS:         Bone mineral density (BMD) (g/cm2):  1.053       Total Hip T-Score:  0.1       % young normals:  102       % age matched controls:  107       Change from prior hip examination:  n/a                FEMORAL NECK ANALYSIS RESULTS:         Bone mineral density (BMD) (g/cm2):  0.868       Femoral neck T-Score:  -0.5       % young normals:  93       % age matched controls:  105       Change from prior hip examination:  n/a       LEFT FOREARM ANALYSIS:   Bone mineral density:  0.892         T-score:  1.4     % young normal:  109   % age match controls:  112            ADDITIONAL FINDINGS:  The Z-scores in left femoral neck, left total hip, lumbar spine and left forearm are 0.3, 0.4, -0.5 and 1.9 respectively              Impression   CONCLUSION:  Bone mineral density is in the normal range.      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 CBC/PSA stable. We've extensively discussed risks and benefits. Again reviewed risks of extremely high T levels.   - Repeat surveillance T  labs  - Reviewed that anastrozole therapy is not advised and there is no clinical indication to prescribe this    #Hypothyroidism, unspecified type  Plan: clinically and biochemically euthyroid. He has tried LT4 before but feels consistently better on [place]. He's aware of the diff in ratio of T4:T3 in [place] compared to that of a human thyroid.   - Currently on [place] 75mg + Synthroid 25mcg with biochemical euthyroidism  - TFTs q6 months      Return to Clinic in 6 months      The above plan was discussed in detail with the patient who verbalized understanding and agreement.      Geraldine Kaba DO  [facility]  7/10/25    Note to patient: The 21 Century Cures Act makes medical notes like these available to patients in the interest of transparency. However, be advised this is a medical document. It is intended as peer to peer communication. It is written in medical language and may contain abbreviations or verbiage that are unfamiliar. It may appear blunt or direct. Medical documents are intended to carry relevant information, facts as evident, and the clinical opinion of the practitioner.      In reviewing this note, please be advised that Dragon Voice Recognition software used to dictate the note may have made errors in recognizing some of the words or phrases.

## 2025-07-17 ENCOUNTER — OFFICE VISIT (OUTPATIENT)
Age: 55
End: 2025-07-17
Payer: COMMERCIAL

## 2025-07-17 VITALS
HEART RATE: 94 BPM | WEIGHT: 248.19 LBS | RESPIRATION RATE: 16 BRPM | HEIGHT: 70 IN | OXYGEN SATURATION: 93 % | DIASTOLIC BLOOD PRESSURE: 80 MMHG | TEMPERATURE: 98 F | BODY MASS INDEX: 35.53 KG/M2 | SYSTOLIC BLOOD PRESSURE: 128 MMHG

## 2025-07-17 DIAGNOSIS — Z98.890 H/O PARATHYROIDECTOMY: ICD-10-CM

## 2025-07-17 DIAGNOSIS — R79.89 LOW TESTOSTERONE IN MALE: ICD-10-CM

## 2025-07-17 DIAGNOSIS — Z90.89 H/O PARATHYROIDECTOMY: ICD-10-CM

## 2025-07-17 DIAGNOSIS — Z11.3 SCREENING FOR STDS (SEXUALLY TRANSMITTED DISEASES): ICD-10-CM

## 2025-07-17 DIAGNOSIS — I25.10 CORONARY ARTERY CALCIFICATION: ICD-10-CM

## 2025-07-17 DIAGNOSIS — Z78.9 STATIN INTOLERANCE: ICD-10-CM

## 2025-07-17 DIAGNOSIS — E03.9 HYPOTHYROIDISM, UNSPECIFIED TYPE: ICD-10-CM

## 2025-07-17 DIAGNOSIS — Z98.890 HISTORY OF THORACOTOMY: ICD-10-CM

## 2025-07-17 DIAGNOSIS — F90.0 ATTENTION DEFICIT HYPERACTIVITY DISORDER (ADHD), PREDOMINANTLY INATTENTIVE TYPE: ICD-10-CM

## 2025-07-17 DIAGNOSIS — I10 PRIMARY HYPERTENSION: ICD-10-CM

## 2025-07-17 DIAGNOSIS — J98.6 DIAPHRAGM PARALYSIS: ICD-10-CM

## 2025-07-17 DIAGNOSIS — Z01.818 PREOPERATIVE EXAMINATION: Primary | ICD-10-CM

## 2025-07-17 PROCEDURE — 3008F BODY MASS INDEX DOCD: CPT | Performed by: FAMILY MEDICINE

## 2025-07-17 PROCEDURE — 3074F SYST BP LT 130 MM HG: CPT | Performed by: FAMILY MEDICINE

## 2025-07-17 PROCEDURE — 3079F DIAST BP 80-89 MM HG: CPT | Performed by: FAMILY MEDICINE

## 2025-07-17 PROCEDURE — 99214 OFFICE O/P EST MOD 30 MIN: CPT | Performed by: FAMILY MEDICINE

## 2025-07-17 RX ORDER — TRAZODONE HYDROCHLORIDE 50 MG/1
TABLET ORAL NIGHTLY PRN
COMMUNITY
Start: 2025-04-28

## 2025-07-18 ENCOUNTER — LAB ENCOUNTER (OUTPATIENT)
Dept: LAB | Facility: HOSPITAL | Age: 55
End: 2025-07-18
Attending: FAMILY MEDICINE
Payer: COMMERCIAL

## 2025-07-18 DIAGNOSIS — Z11.3 SCREENING FOR STDS (SEXUALLY TRANSMITTED DISEASES): ICD-10-CM

## 2025-07-18 DIAGNOSIS — Z01.818 PREOPERATIVE EXAMINATION: ICD-10-CM

## 2025-07-18 LAB
ALBUMIN SERPL-MCNC: 4.6 G/DL (ref 3.2–4.8)
ALBUMIN/GLOB SERPL: 1.8 {RATIO} (ref 1–2)
ALP LIVER SERPL-CCNC: 61 U/L (ref 45–117)
ALT SERPL-CCNC: 24 U/L (ref 10–49)
ANION GAP SERPL CALC-SCNC: 4 MMOL/L (ref 0–18)
AST SERPL-CCNC: 27 U/L (ref ?–34)
BASOPHILS # BLD AUTO: 0.05 X10(3) UL (ref 0–0.2)
BASOPHILS NFR BLD AUTO: 0.7 %
BILIRUB SERPL-MCNC: 0.5 MG/DL (ref 0.3–1.2)
BUN BLD-MCNC: 15 MG/DL (ref 9–23)
CALCIUM BLD-MCNC: 9.2 MG/DL (ref 8.7–10.6)
CHLORIDE SERPL-SCNC: 103 MMOL/L (ref 98–112)
CO2 SERPL-SCNC: 33 MMOL/L (ref 21–32)
CREAT BLD-MCNC: 1.27 MG/DL (ref 0.7–1.3)
EGFRCR SERPLBLD CKD-EPI 2021: 68 ML/MIN/1.73M2 (ref 60–?)
EOSINOPHIL # BLD AUTO: 0.19 X10(3) UL (ref 0–0.7)
EOSINOPHIL NFR BLD AUTO: 2.7 %
ERYTHROCYTE [DISTWIDTH] IN BLOOD BY AUTOMATED COUNT: 12.9 %
FASTING STATUS PATIENT QL REPORTED: YES
GLOBULIN PLAS-MCNC: 2.5 G/DL (ref 2–3.5)
GLUCOSE BLD-MCNC: 97 MG/DL (ref 70–99)
HAV AB SER QL IA: NONREACTIVE
HBV SURFACE AB SER QL: NONREACTIVE
HBV SURFACE AB SERPL IA-ACNC: <3.1 MIU/ML
HBV SURFACE AG SER-ACNC: <0.1 [IU]/L
HBV SURFACE AG SERPL QL IA: NONREACTIVE
HCT VFR BLD AUTO: 50.8 % (ref 39–53)
HCV AB SERPL QL IA: NONREACTIVE
HGB BLD-MCNC: 17.5 G/DL (ref 13–17.5)
IMM GRANULOCYTES # BLD AUTO: 0.05 X10(3) UL (ref 0–1)
IMM GRANULOCYTES NFR BLD: 0.7 %
LYMPHOCYTES # BLD AUTO: 1.44 X10(3) UL (ref 1–4)
LYMPHOCYTES NFR BLD AUTO: 20.7 %
MCH RBC QN AUTO: 30.4 PG (ref 26–34)
MCHC RBC AUTO-ENTMCNC: 34.4 G/DL (ref 31–37)
MCV RBC AUTO: 88.2 FL (ref 80–100)
MONOCYTES # BLD AUTO: 0.55 X10(3) UL (ref 0.1–1)
MONOCYTES NFR BLD AUTO: 7.9 %
NEUTROPHILS # BLD AUTO: 4.66 X10 (3) UL (ref 1.5–7.7)
NEUTROPHILS # BLD AUTO: 4.66 X10(3) UL (ref 1.5–7.7)
NEUTROPHILS NFR BLD AUTO: 67.3 %
OSMOLALITY SERPL CALC.SUM OF ELEC: 291 MOSM/KG (ref 275–295)
PLATELET # BLD AUTO: 231 10(3)UL (ref 150–450)
POTASSIUM SERPL-SCNC: 4.2 MMOL/L (ref 3.5–5.1)
PROT SERPL-MCNC: 7.1 G/DL (ref 5.7–8.2)
RBC # BLD AUTO: 5.76 X10(6)UL (ref 4.3–5.7)
SODIUM SERPL-SCNC: 140 MMOL/L (ref 136–145)
WBC # BLD AUTO: 6.9 X10(3) UL (ref 4–11)

## 2025-07-18 PROCEDURE — 87340 HEPATITIS B SURFACE AG IA: CPT

## 2025-07-18 PROCEDURE — 86803 HEPATITIS C AB TEST: CPT

## 2025-07-18 PROCEDURE — 86708 HEPATITIS A ANTIBODY: CPT

## 2025-07-18 PROCEDURE — 80053 COMPREHEN METABOLIC PANEL: CPT

## 2025-07-18 PROCEDURE — 87389 HIV-1 AG W/HIV-1&-2 AB AG IA: CPT

## 2025-07-18 PROCEDURE — 36415 COLL VENOUS BLD VENIPUNCTURE: CPT

## 2025-07-18 PROCEDURE — 85025 COMPLETE CBC W/AUTO DIFF WBC: CPT

## 2025-07-18 PROCEDURE — 86706 HEP B SURFACE ANTIBODY: CPT

## 2025-07-22 PROBLEM — I10 PRIMARY HYPERTENSION: Status: ACTIVE | Noted: 2025-07-22

## 2025-07-22 NOTE — PROGRESS NOTES
Hany Fontanez  6/19/1970    Chief Complaint   Patient presents with    Pre-Op Exam     hrenic nerve repair  8/15/2025 with  Dr. Robertson  ROOM 10       HPI:   Hany Fontanez is a 55 year old male who presents for preoperative evaluation prior to his planned VATS with phrenic neurolysis and sural nerve graft with Synapse pacer placement some thank you and left recurrent laryngeal nerve neurolysis and nerve transfer for treatment of his diaphragmatic paralysis and voice hoarseness.  The procedure will be with the plastic surgery center and [place] currently scheduled for 8/15/2025.  He would like to repeat a strep test prior to the procedure to assess if there has been any recovery in his diaphragmatic paralysis.  He has continued to follow with ENT and had recent repeat vocal cord injections.  He has no new chest pain and stable dyspnea.  No history of DVT.    Current Medications[1]   Allergies[2]   Past Medical History[3]   Problem List[4]   Past Surgical History[5]   Family History[6]   Short Social Hx on File[7]      REVIEW OF SYSTEMS:   GENERAL: feels well otherwise  SKIN: no rashes  EYES: no new vision changes  HEENT: not congested  LUNGS: no new dyspnea  CARDIOVASCULAR: no new chest pain  GI: no new abdominal pain  NEURO: no headaches    EXAM:   /80 (BP Location: Right arm)   Pulse 94   Temp 97.9 °F (36.6 °C) (Oral)   Resp 16   Ht 5' 10\" (1.778 m)   Wt 248 lb 3.2 oz (112.6 kg)   SpO2 93%   BMI 35.61 kg/m²   GENERAL: Well developed, well nourished,in no apparent distress  SKIN: No rashes,no suspicious lesions  EYES: PERRLA, EOMI, conjunctiva are clear  HEENT: atraumatic, normocephalic,ears and throat are clear  NECK: supple,no adenopathy,no bruits  LUNGS: clear to auscultation  CARDIO: RRR without murmur  GI: good BS's,no masses, HSM or tenderness  EXT: no swelling or calf tenderness.     ASSESSMENT AND PLAN:   Hany Fontanez is a 55 year old male who presents for  preoperative examination prior to his VATS with phrenic neurolysis and sural nerve graft with Synapse pacer placement and left recurrent laryngeal nerve neurolysis and nerve transfer for treatment of his diaphragmatic paralysis and voice hoarseness.  His recent EKG from February 21, 2025 shows no ischemic changes.  His chest x-ray from February 21, 2025 and CTA from the same date show elevation of the left hemidiaphragm and no other acute intrathoracic processes.  He will complete his perioperative blood work.  We will defer venous ultrasound given he has no clinical signs or symptoms of DVT and no prior history.  We discussed perioperative medication management.  He may proceed with his upcoming procedure at acceptable medical risk.    Preoperative examination  - Hep A AB, Total [E]; Future  - CBC W Differential W Platelet [E]; Future  - Comp Metabolic Panel (14); Future  - Hepatitis B Surface Antigen; Future  - HCV Antibody; Future  - HIV AG AB COMBO; Future  - Hepatitis B Surface Antibody; Future  Diaphragm paralysis  History of thoracotomy  H/O parathyroidectomy  Coronary artery calcification  Statin intolerance  Primary hypertension  Hypothyroidism, unspecified type  Low testosterone in male  ADHD    All questions were answered and the patient agrees with the plan.     Thank you,  Lamin Persaud MD         [1]   Current Outpatient Medications   Medication Sig Dispense Refill    traZODone 50 MG Oral Tab Take 0.5-1 tablets (25-50 mg total) by mouth nightly as needed. AT BEDTIME      SYNTHROID 25 MCG Oral Tab Take 1 tablet (25 mcg total) by mouth before breakfast. 90 tablet 1    Syringe/Needle, Disp, 25G X 1\" 1 ML Does not apply Misc Use to inject Testosterone as directed. 100 each 0    fluticasone furoate-vilanterol (BREO ELLIPTA) 100-25 MCG/ACT Inhalation Aerosol Powder, Breath Activated Inhale 1 puff into the lungs daily. 1 each 8    albuterol 108 (90 Base) MCG/ACT Inhalation Aero Soln Inhale 2 puffs into the  lungs every 6 (six) hours as needed for Wheezing. inhale 2 puff by inhalation route  every 4 - 6 hours as needed 1 each 3    testosterone cypionate 200 mg/mL Intramuscular Solution Inject 0.5 mL (100 mg total) into the muscle twice a week. Fill in 1 ml vials. 26 mL 1    ezetimibe 10 MG Oral Tab Take 1 tablet (10 mg total) by mouth nightly. 90 tablet 3    losartan 25 MG Oral Tab Take 1 tablet (25 mg total) by mouth daily. 90 tablet 1    ARMOUR THYROID 60 MG Oral Tab Take 1 tablet (60 mg total) by mouth daily. + 1 tablet (15 mg) by mouth daily (75 mg total daily dose)  E03.9 90 tablet 1    ARMOUR THYROID 15 MG Oral Tab Take 1 tablet (15 mg total) by mouth daily. + 1 tablet (60 mg) by mouth daily (75 mg total daily dose) 90 tablet 1    Needle, Disp, (BD DISP NEEDLE) 25G X 1\" Does not apply Misc USE AS DIRECTED TWICE WEEKLY 100 each 0    methylphenidate ER 20 MG Oral Capsule SR 24 Hr Take 1 capsule (20 mg total) by mouth every morning.      methylphenidate 5 MG Oral Tab Take 1-2 tablets (5-10 mg total) by mouth daily as needed. IN THE AFTERNOON      BD SYRINGE SLIP TIP 1 ML Does not apply Misc       Cholecalciferol (VITAMIN D3) 25 MCG (1000 UT) Oral Cap Take 2 tablets by mouth in the morning. 5,000 also has k-2.     [2] No Known Allergies  [3]   Past Medical History:   Attention deficit hyperactivity disorder (ADHD)    COVID-19    fatigue    Disorder of thyroid    GERD (gastroesophageal reflux disease)    High blood pressure    High cholesterol    Hyperlipidemia    Low testosterone    Thyroid disease    Visual impairment    glasses   [4]   Patient Active Problem List  Diagnosis    Hypercholesteremia    Hypothyroidism    Attention deficit hyperactivity disorder (ADHD), predominantly inattentive type    Low testosterone in male    Primary hyperparathyroidism (HCC)    Anxiety and depression    Coronary artery calcification    Statin intolerance    Ectopic parathyroid adenoma    Mediastinal mass    Diaphragm paralysis     Shortness of breath    Cough in adult    Sleep apnea    H/O parathyroidectomy   [5]   Past Surgical History:  Procedure Laterality Date    Other surgical history  10/2024    THORACIC SURGERY FOR REMOVAL OF PARATHYROID ANOMA    Other surgical history  02/2025    INJECTION IN VOCAL CORDS    Parathyroidectomy      Rotator cuff repair Right 01/2023   [6]   Family History  Problem Relation Age of Onset    Thyroid disease Mother     Heart Disorder Father         Cardiac bypass   [7]   Social History  Socioeconomic History    Marital status:    Occupational History     Employer: [employer]     Comment: [employer]   Tobacco Use    Smoking status: Never    Smokeless tobacco: Never   Vaping Use    Vaping status: Never Used   Substance and Sexual Activity    Alcohol use: Yes     Alcohol/week: 2.0 standard drinks of alcohol     Types: 2 Standard drinks or equivalent per week    Drug use: Never   Other Topics Concern    Caffeine Concern Yes     Comment: coffee- 3 cups a day,Sugar free Redbull - couple a week, Black tea Once a day     Social Drivers of Health     Food Insecurity: No Food Insecurity (10/30/2024)    Received from [facility]    Hunger Vital Sign     Worried About Running Out of Food in the Last Year: Never true     Ran Out of Food in the Last Year: Never true   Transportation Needs: Unknown (10/30/2024)    Received from [facility]    PRAPARE - Transportation     Lack of Transportation (Non-Medical): No   Housing Stability: Unknown (10/30/2024)    Received from [facility]    Housing Stability Vital Sign     Unable to Pay for Housing in the Last Year: No     Homeless in the Last Year: No

## 2025-07-25 ENCOUNTER — HOSPITAL ENCOUNTER (OUTPATIENT)
Dept: GENERAL RADIOLOGY | Age: 55
Discharge: HOME | End: 2025-07-25
Attending: FAMILY MEDICINE
Payer: COMMERCIAL

## 2025-07-25 ENCOUNTER — TELEPHONE (OUTPATIENT)
Facility: CLINIC | Age: 55
End: 2025-07-25

## 2025-07-25 ENCOUNTER — TELEPHONE (OUTPATIENT)
Age: 55
End: 2025-07-25

## 2025-07-25 DIAGNOSIS — J98.6 DIAPHRAGM PARALYSIS: ICD-10-CM

## 2025-07-25 DIAGNOSIS — Z01.818 PREOPERATIVE EXAMINATION: Primary | ICD-10-CM

## 2025-07-25 PROCEDURE — 76000 FLUOROSCOPY <1 HR PHYS/QHP: CPT | Performed by: FAMILY MEDICINE

## 2025-07-25 NOTE — TELEPHONE ENCOUNTER
Pt states that his PCP ordered a repeat sniff test and it has improved when compared to 5-1-25.  Pt has phrenic nerve surgery scheduled for 8-11-25 in [place].  He  would like to discuss how to proceed with Dr. Colbert.  Dr. Colbert will not be in office until 8-4-25.    Informed pt of this.    Advised pt that message will also be sent to SANTO Fung who will see the message on Monday.

## 2025-07-25 NOTE — TELEPHONE ENCOUNTER
Dr. Robertson office called stating we did not order a PTINR for pre op clearance and she stated it was on the form they faxed us-please order this for the patient and let him know to get it done ASAP

## 2025-07-25 NOTE — TELEPHONE ENCOUNTER
Patient  is calling to let dr Royal   now about a test. To get dr royal option. Patient is going to do surgery in 2 weeks

## 2025-07-28 ENCOUNTER — LAB ENCOUNTER (OUTPATIENT)
Dept: LAB | Facility: HOSPITAL | Age: 55
End: 2025-07-28
Attending: FAMILY MEDICINE

## 2025-07-28 DIAGNOSIS — Z01.818 PREOPERATIVE EXAMINATION: ICD-10-CM

## 2025-07-28 LAB
INR BLD: 0.97 (ref 0.8–1.2)
PROTHROMBIN TIME: 13 SECONDS (ref 11.6–14.8)

## 2025-07-28 PROCEDURE — 85610 PROTHROMBIN TIME: CPT

## 2025-07-28 PROCEDURE — 36415 COLL VENOUS BLD VENIPUNCTURE: CPT

## 2025-07-28 NOTE — TELEPHONE ENCOUNTER
Pt notified that 2 of our providers were notified of pt's question and pt will need to wait until Dr. Colbert's return to discuss his current sniff test results and next steps.  Pt verbalizes understanding.

## 2025-07-29 ENCOUNTER — RESULTS FOLLOW-UP (OUTPATIENT)
Age: 55
End: 2025-07-29

## 2025-08-02 ENCOUNTER — PATIENT MESSAGE (OUTPATIENT)
Age: 55
End: 2025-08-02

## 2025-08-12 ENCOUNTER — TELEPHONE (OUTPATIENT)
Age: 55
End: 2025-08-12

## 2025-08-14 ENCOUNTER — MED REC SCAN ONLY (OUTPATIENT)
Facility: CLINIC | Age: 55
End: 2025-08-14

## 2025-08-15 ENCOUNTER — ORDER TRANSCRIPTION (OUTPATIENT)
Facility: HOSPITAL | Age: 55
End: 2025-08-15

## 2025-08-15 DIAGNOSIS — J98.6 DIAPHRAGM PARALYSIS: Primary | ICD-10-CM

## 2025-08-18 ENCOUNTER — CARDPULM VISIT (OUTPATIENT)
Facility: HOSPITAL | Age: 55
End: 2025-08-18
Attending: INTERNAL MEDICINE

## 2025-08-21 ENCOUNTER — CARDPULM VISIT (OUTPATIENT)
Facility: HOSPITAL | Age: 55
End: 2025-08-21
Attending: INTERNAL MEDICINE

## 2025-08-21 ENCOUNTER — TELEPHONE (OUTPATIENT)
Age: 55
End: 2025-08-21

## 2025-08-21 DIAGNOSIS — I10 PRIMARY HYPERTENSION: ICD-10-CM

## 2025-08-22 ENCOUNTER — OFFICE VISIT (OUTPATIENT)
Dept: SLEEP CENTER | Age: 55
End: 2025-08-22
Attending: INTERNAL MEDICINE

## 2025-08-22 DIAGNOSIS — G47.30 SLEEP APNEA, UNSPECIFIED TYPE: ICD-10-CM

## 2025-08-22 PROCEDURE — 95806 SLEEP STUDY UNATT&RESP EFFT: CPT

## 2025-08-23 ENCOUNTER — OFFICE VISIT (OUTPATIENT)
Age: 55
End: 2025-08-23

## 2025-08-23 VITALS
RESPIRATION RATE: 18 BRPM | BODY MASS INDEX: 35.39 KG/M2 | TEMPERATURE: 97 F | DIASTOLIC BLOOD PRESSURE: 86 MMHG | HEART RATE: 78 BPM | OXYGEN SATURATION: 94 % | SYSTOLIC BLOOD PRESSURE: 132 MMHG | HEIGHT: 70 IN | WEIGHT: 247.19 LBS

## 2025-08-23 DIAGNOSIS — G47.30 SLEEP APNEA, UNSPECIFIED TYPE: ICD-10-CM

## 2025-08-23 DIAGNOSIS — J98.6 DIAPHRAGM PARALYSIS: ICD-10-CM

## 2025-08-23 DIAGNOSIS — I10 PRIMARY HYPERTENSION: Primary | ICD-10-CM

## 2025-08-23 PROCEDURE — 3008F BODY MASS INDEX DOCD: CPT | Performed by: FAMILY MEDICINE

## 2025-08-23 PROCEDURE — 3079F DIAST BP 80-89 MM HG: CPT | Performed by: FAMILY MEDICINE

## 2025-08-23 PROCEDURE — 3075F SYST BP GE 130 - 139MM HG: CPT | Performed by: FAMILY MEDICINE

## 2025-08-23 PROCEDURE — 99214 OFFICE O/P EST MOD 30 MIN: CPT | Performed by: FAMILY MEDICINE

## 2025-08-26 ENCOUNTER — CARDPULM VISIT (OUTPATIENT)
Facility: HOSPITAL | Age: 55
End: 2025-08-26
Attending: INTERNAL MEDICINE

## 2025-08-26 RX ORDER — LOSARTAN POTASSIUM 25 MG/1
25 TABLET ORAL DAILY
Qty: 90 TABLET | Refills: 3 | Status: SHIPPED | OUTPATIENT
Start: 2025-08-26

## 2025-08-27 ENCOUNTER — RESULTS FOLLOW-UP (OUTPATIENT)
Facility: CLINIC | Age: 55
End: 2025-08-27

## 2025-08-27 DIAGNOSIS — G47.33 OSA (OBSTRUCTIVE SLEEP APNEA): Primary | ICD-10-CM

## 2025-08-28 ENCOUNTER — CARDPULM VISIT (OUTPATIENT)
Facility: HOSPITAL | Age: 55
End: 2025-08-28
Attending: INTERNAL MEDICINE

## (undated) DIAGNOSIS — R79.89 LOW TESTOSTERONE IN MALE: ICD-10-CM

## (undated) DIAGNOSIS — E55.9 VITAMIN D DEFICIENCY: Primary | ICD-10-CM

## (undated) DIAGNOSIS — E21.0 PRIMARY HYPERPARATHYROIDISM (HCC): Primary | ICD-10-CM

## (undated) DEVICE — SUT FIBERWIRE 2 T-5 AR-7200

## (undated) DEVICE — MEDI-VAC NON-CONDUCTIVE SUCTION TUBING: Brand: CARDINAL HEALTH

## (undated) DEVICE — ABSORBANT FLOOR PAD

## (undated) DEVICE — CLOSURE EXOFIN 1.0ML

## (undated) DEVICE — STOCK SURG LG 48X9IN

## (undated) DEVICE — SHOULDER ARTHROSCOPY CDS-LF: Brand: MEDLINE INDUSTRIES, INC.

## (undated) DEVICE — 1000 S-DRAPE TOWEL DRAPE 10/BX: Brand: STERI-DRAPE™

## (undated) DEVICE — GOWN,SIRUS,FABRIC-REINFORCED,X-LARGE: Brand: MEDLINE

## (undated) DEVICE — TUBING DW OUTFLOW

## (undated) DEVICE — SOLUTION  .9 3000ML

## (undated) DEVICE — COVER,MAYO STAND,STERILE: Brand: MEDLINE

## (undated) DEVICE — [RESECTOR CUTTER, ARTHROSCOPIC SHAVER BLADE,  DO NOT RESTERILIZE,  DO NOT USE IF PACKAGE IS DAMAGED,  KEEP DRY,  KEEP AWAY FROM SUNLIGHT]: Brand: FORMULA

## (undated) DEVICE — 3M™ IOBAN™ 2 ANTIMICROBIAL INCISE DRAPE 6648EZ: Brand: IOBAN™ 2

## (undated) DEVICE — SUPER TURBOVAC 90 IFS: Brand: COBLATION

## (undated) DEVICE — STERILE POLYISOPRENE POWDER-FREE SURGICAL GLOVES: Brand: PROTEXIS

## (undated) DEVICE — LIGHT HANDLE

## (undated) DEVICE — PAD SACRAL PREMIUM 12X12X1

## (undated) DEVICE — SUT MONOCRYL 3-0 PS-2 Y427H

## (undated) DEVICE — DEVICE FLUID REMOVAL-WATER BUG

## (undated) DEVICE — DRAPE,U/SHT,SPLIT,FILM,60X84,STERILE: Brand: MEDLINE

## (undated) DEVICE — KIT ARTH INST TROCAR 2.6MM

## (undated) DEVICE — ALCOHOL 70% 4 OZ

## (undated) DEVICE — 5.0 MM I.D. UNIVERSAL CANNULA, 76                                    MM LONG, BLUE, LATEX SEAL,                                    SINGLE-USE STERILE PACKS, BOX OF 10.                                    INCLUDES OBTURATOR AND TROCAR

## (undated) DEVICE — SHEET,DRAPE,70X100,STERILE: Brand: MEDLINE

## (undated) DEVICE — SLEEVE KENDALL SCD EXPRESS MED

## (undated) DEVICE — TUBING IRR 16FT CNT WV 3 ASCP

## (undated) DEVICE — DUAL SPIKE ADAPTER: Brand: CONMED

## (undated) DEVICE — #15 STERILE STAINLESS BLADE: Brand: STERILE STAINLESS BLADES

## (undated) DEVICE — GAUZE TRAY STERILE 4X4 12PLY

## (undated) DEVICE — COVER LIGHT HANDLE RIGID GREEN

## (undated) DEVICE — STERILE POLYISOPRENE POWDER-FREE SURGICAL GLOVES WITH EMOLLIENT COATING: Brand: PROTEXIS

## (undated) DEVICE — 3M™ STERI-STRIP™ REINFORCED ADHESIVE SKIN CLOSURES, R1547, 1/2 IN X 4 IN (12 MM X 100 MM), 6 STRIPS/ENVELOPE: Brand: 3M™ STERI-STRIP™

## (undated) DEVICE — 3M™ TEGADERM™ +PAD FILM DRESSING WITH NON-ADHERENT PAD, 3584, 2-3/8 IN X 4 IN (6 CM X 10 CM), 50/CAR, 4 CAR/CS: Brand: 3M™ TEGADERM™

## (undated) DEVICE — SUT VICRYL 3-0 J110T

## (undated) DEVICE — SUT MONOCRYL 2-0 SH Y417H

## (undated) DEVICE — KIT TRC TRIMANO BEACH CHR ARM

## (undated) DEVICE — [AGGRESSIVE PLUS CUTTER, ARTHROSCOPIC SHAVER BLADE,  DO NOT RESTERILIZE,  DO NOT USE IF PACKAGE IS DAMAGED,  KEEP DRY,  KEEP AWAY FROM SUNLIGHT]: Brand: FORMULA

## (undated) NOTE — LETTER
Date: 12/4/2023    Patient Name: Philomena Wagner          To Whom it may concern: This letter has been written at the patient's request. The above patient was seen at the [facility] for treatment of a medical condition. This patient should be excused from attending work from 12/4/2023 through 12/05/2023. The patient may return to work on 12/06/2023.         Sincerely,          SANTO Oneal

## (undated) NOTE — Clinical Note
Hi Dr Roselia Gonzalez, I met with Giovanny Pablo again today to discuss his elevated T (see my note for details). One thing I suggested that he was amenable to is a JAYLIN workup from you, and he asked that the chart be forwarded to you.  Thanks! -Nathalia Santo

## (undated) NOTE — LETTER
Date: 2/27/2023    Patient Name: Sheila Hurtado          To Whom it may concern: This letter has been written at the patient's request. The above patient was seen at the [facility] for treatment of a medical condition. We discussed no lifting greater 15lbs. Sincerely,          JULIET Crabtree PA-C Orthopedic Surgery / Sports Medicine Specialist  [facility]  [address]   [facility]  [email or web address]  t: [phone]  o: [phone]  f: [phone]          This note was dictated using Dragon software. While it was briefly proofread prior to completion, some grammatical, spelling, and word choice errors due to dictation may still occur.

## (undated) NOTE — LETTER
Patient Name: Hany Fontanez  YOB: 1970          MRN number:  [id number]  Date:  6/9/2025  Referring Physician:  Physician Nonstaff    Discharge Summary  Pt has attended 6 visits in Speech Therapy.   Today's Date   6/9/2025        Treatment Day: 6    Dear Kelly Clay,  This letter is to inform you of Hany Fontanez's progress in speech-language therapy.    Since his initial evaluation, Hany has attended 6 sessions. Therapy sessions have targeted vocal quality and breath support. A home exercise program (HEP) addressing breath support and vocal function exercises has been provided and completed consistently. During this treatment period, Hany has demonstrated improved independence with completion of VFEs and intensity of phonation given compensatory strategies. As he is independent with completion of HEP, it is recommended that he be discharged from speech therapy at this time. It is also recommended that he re-consult with ST given need following surgery.    Subjective  Patient arrived on time to session. Patient participated actively in therapeutic tasks and reports completion of HEP within the last week. Scheduled for nerve graft surgery on 8/15.       Pain: 0/10     Objective  See goals below.      Goals: (to be met in 8 visits)        Patient will name and report use of appropriate vocal hygiene strategies given min verbal and visual cues.   Patient reports use of vocal hygiene strategies within few weeks.    Progress: Goal met.   2.   Patient will complete vocal function and breath support exercises with 85% accuracy given mod verbal and visual cues.   90% given min verbal and visual cues   Progress:    Goal met.   3.   Patient will demonstrate diaphragmatic breathing at rest in 9/10 attempts given mod verbal and visual cues.   10/10 independently within confounds of diaphragm paralysis   Progress: Goal met.   4.   Patient will demonstrate adequate breath support for production of  reading and conversation within 80% of attempts given mod verbal and visual cues.   85% given mod verbal and visual cues    Progress: Goal met.   5.  Patient will independently complete home exercise program (i.e., vocal function and breath support exercises) at least 6 times per week across 6 weeks.   Increased completion of HEP to daily    Progress: Goal met.        Assessment  Patient presented to speech therapy with dysphonia c/b breathiness, decreased coordination of respiration and phonation, decreased breath support, laryngeal muscle tension and strain. Throughout plan of care, patient has demonstrated improved intensity of phonation and ability to utilize limited breath support effectively to sustain speech. Patient continues to require modifications to produce functional voice for daily activities. As patient is independent with completion of HEP, it is recommended that he be discharged from speech therapy at this time. Patient was also advised to re-consult with ST upon completion of surgery given need.    Plan  Discontinue speech therapy with continued compliance to HEP. Re-consult given need following surgery.    HEP  VFEs, vocal hygiene, deep diphragmatic breathing, functional phrases     Charges  [id number]    Total Treatment Time: 45 min    Patient/Family/Caregiver was advised of these findings, precautions, and treatment options and has agreed to actively participate in planning and for this course of care.    Thank you for your referral. If you have any questions, please contact me at Dept: [phone].    Sincerely,  Electronically signed by therapist: SAUD Mojica     21st Century Cures Act Notice to Patient: Medical documents like this are made available to patients in the interest of transparency. However, be advised this is a medical document and it is intended as [id number] communication between your medical providers. This medical document may contain abbreviations, assessments, medical  data, and results or other terms that are unfamiliar. Medical documents are intended to carry relevant information, facts as evident, and the clinical opinion of the practitioner. As such, this medical document may be written in language that appears blunt or direct. You are encouraged to contact your medical provider and/or [facility] Patient Experience if you have any questions about this medical document.

## (undated) NOTE — LETTER
22  [address] [facility]  Pre-Operative Clearance Request    Patient Name:   Opal Butt             :   1970    Surgeon: Dr. Ana Laura Brennan             Date of Surgery: 23     Surgical Procedure: RIGHT SHOULDER ARTHROSCOPY SUBPECTORAL OPEN BICEPS TENODESIS, SAD, SLAP REPAIR. Please Complete: 2-3 WEEKS PRIOR TO SURGERY TO AVOID CANCELLATION OF SURGERY. [x]  History and Physical       [x]  Medical  Clearance                     Testing is ordered by Effie KELLY per anesthesia guidelines                         **Please fax test results, H&P, and clearance to [phone] and to                                      P. A.T at [phone]**

## (undated) NOTE — LETTER
Patient Name: Hany Fontanez  YOB: 1970          MRN number:  [id number]  Date:  4/10/2025  Referring Physician:  Physician Nonstaff           ADULT SLP EVALUATION:     Diagnosis:   L TVC paralysis and dysphonia Patient:  Hany Fontanez (54 year old, male)        Referring Provider: Kelly Clay  Today's Date: 4/10/2025    Precautions:   Other (use comment) Date of Evaluation: 04/10/25  Next MD visit: No data recorded     PATIENT SUMMARY   Summary of chief complaints: weak voice with limited ability to sustain phonation for long durations of time. Patient reports fatigue with voice throughout the day.   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 Patient with glottic gap and aphonia following procedure. Patient with prolaryn injection on 3/13/2025 with some noticed improvement in voicing.   Pain level:  0 /10  Current limitations: difficulty communicating within social and occupational tasks 2/2 dysphonia.  Pt goals: improve voicing     Hospital History: N/A     Past medical history was reviewed with Hany.  Significant findings include: parathyroid mass, paralyzed diaphragm, L TVC paralysis  Hany Fotnanez  has a past medical history of Attention deficit hyperactivity disorder (ADHD), COVID-19 (11/12/2022), Disorder of thyroid, High cholesterol, Hyperlipidemia, Low testosterone, Thyroid disease, and Visual impairment.  Medications Ordered Prior to this Encounter[1]    ASSESSMENT  Hany presents to speech therapy evaluation with primary c/o weak voice. The results of the objective tests and measures show dysphonia c/b breathiness, decreased coordination of respiration and phonation, decreased breath support, laryngeal muscle tension and strain. Functional deficits include but are not limited to difficulty communicating within social and occupational tasks 2/2 dysphonia.  Signs and symptoms are consistent with diagnosis of L TVC paralysis and dysphonia. Pt and SLP discussed evaluation findings, pathology, POC and HEP.  Pt voiced understanding and performs HEP correctly. Skilled Speech Therapy is medically necessary to address the above impairments and reach functional goals.     OBJECTIVE:     VOCAL HANDICAP INDEX SCORE   Functional severe; 27 /40   Physical moderate; 24 /40   Emotional moderate; 24 /40   TOTAL SCORE severe; 75 /120      VOICE ANALYSIS   Voice History and Hygiene   Current Voice Diagnosis: dysphonia  Date of Onset: October 2024  Date of ENT Evaluation: 4/4/2025  Laryngoscopy Findings: L VC paralysis    Daily water intake: < 16oz (varies based on the day)  Caffeine intake (glasses/day): 2  Alcohol intake (glasses/day): 1 (occassional every other week)  Smoking history: non-smoker    Current vocal demands: dysphonia  Vocal Abuses: social; occupational; talking on phone/computer  Triggers: coughing/sneezing loudly; talking in noisy environments; excessive telephone use; talking for extended periods of time   Consensus Auditory-Perceptual Evaluation of Voice (CAPE-V)   Overall Severity moderately-severe deviant; 75 /100   Roughness mild-moderately deviant; 25 /100   Breathiness moderately-severe deviant; 80 /100   Strain moderately deviant; 75 /100   Pitch moderately-severe deviant; 80 /100   Loudness moderately-severe deviant; 70 /100   Maximum   Phonation time   3 sec   (15-25+ sec is within normal limits)   S/Z Ratio 1.5 (>1.4 is abnormal)   Resonance: oral focused   Respiration thoracic breathing; clavicular breathing (mixed)        ORAL MOTOR EXAM   Facial and Oral Structure/Appearance:  intact   Dentition  adequate   Symmetry  symmetrical   Strength  reduced labial (R)   Tone  reduced lingual (R)   Range of Motion  WFL   Rate of Motion  WFL   Resonance  oral focused   Respiration  clavicular breathing; thoracic breathing (mixed)   Articulation  WFL   Voice Quality   breathy       Today's Treatment and Response:   Pt education was provided on exam findings, treatment diagnosis, treatment plan, expectations, and prognosis.  Charges: TAI x 1 [id number],  Total Treatment Time: 60 min                                                  PLAN OF CARE:    Goals: (to be met in 8 visits)   Patient will name and report use of appropriate vocal hygiene strategies given min verbal and visual cues.      Progress: New goal   2.   Patient will complete vocal function and breath support exercises with 85% accuracy given mod verbal and visual cues.    Progress:   New goal   3.   Patient will demonstrate diaphragmatic breathing at rest in 9/10 attempts given mod verbal and visual cues.    Progress: New goal   4.   Patient will demonstrate adequate breath support for production of reading and conversation within 80% of attempts given mod verbal and visual cues.      Progress: New goal   5.  Patient will independently complete home exercise program (i.e., vocal function and breath support exercises) at least 6 times per week across 6 weeks.      Progress: New goal   6.   Patient will indicate reduced voice handicap after completing vocal function and breath support exercises measured by improved Voice Handicap Index score (baseline 75/120).      Progress: New goal              Frequency / Duration: Patient will be seen 1x/week or a total of 8  visits over a 90 day period. Treatment will include: speech therapy    Education or treatment limitation: None   Rehab Potential: good    Patient/Family/Caregiver was advised of these findings, precautions, and treatment options and has agreed to actively participate in planning and for this course of care.    Thank you for your referral. Please co-sign or sign and return this letter via fax as soon as possible to [phone]. If you have any questions, please contact me at Dept: [phone]    Sincerely,  Electronically signed by therapist: Shellie Dey  SLP  Physician's certification required: Yes  I certify the need for these services furnished under this plan of treatment and while under my care.    X___________________________________________________ Date____________________    Certification From: 4/10/2025  To:7/9/2025  [1] Current Outpatient Medications on File Prior to Visit: testosterone cypionate 200 mg/mL Intramuscular Solution, Inject 0.5 mL (100 mg total) into the muscle twice a week. Fill in 1 ml vials., acetaminophen 500 MG Oral Tab, Take 2 tablets (1,000 mg total) by mouth 3 (three) times daily., gabapentin 100 MG Oral Cap, Take 1 capsule (100 mg total) by mouth 3 (three) times daily., ibuprofen 600 MG Oral Tab, Take 1 tablet (600 mg total) by mouth., ezetimibe 10 MG Oral Tab, Take 1 tablet (10 mg total) by mouth nightly., losartan 25 MG Oral Tab, Take 1 tablet (25 mg total) by mouth daily., EUTHYROX 25 MCG Oral Tab, Take 1 tablet (25 mcg total) by mouth before breakfast., ARMOUR THYROID 60 MG Oral Tab, Take 1 tablet (60 mg total) by mouth daily. + 1 tablet (15 mg) by mouth daily (75 mg total daily dose)  E03.9, ARMOUR THYROID 15 MG Oral Tab, Take 1 tablet (15 mg total) by mouth daily. + 1 tablet (60 mg) by mouth daily (75 mg total daily dose), hydroCHLOROthiazide 12.5 MG Oral Cap, Take 1 capsule (12.5 mg total) by mouth daily., pregabalin 50 MG Oral Cap, Take 1 capsule (50 mg total) by mouth 2 (two) times daily., celecoxib 100 MG Oral Cap, Take 1 capsule (100 mg total) by mouth 2 (two) times daily with meals., oxyCODONE 5 MG Oral Tab, Take 1 tablet (5 mg total) by mouth every 4 (four) hours as needed for Pain., Needle, Disp, (BD DISP NEEDLE) 25G X 1\" Does not apply Misc, USE AS DIRECTED TWICE WEEKLY, methylphenidate ER 20 MG Oral Capsule SR 24 Hr, Take 1 capsule (20 mg total) by mouth every morning., methylphenidate 5 MG Oral Tab, Take 1-2 tablets (5-10 mg total) by mouth daily as needed. IN THE AFTERNOON, BD SYRINGE SLIP TIP 1 ML Does not apply  Misc, USE FOR TESTOSTERONE TWICE WEEKLY., Carboxymeth-Cellulose-CitricAc (PLENITY) Oral Cap, Take 3 capsules by mouth 2 (two) times daily before meals., Cholecalciferol (VITAMIN D3) 25 MCG (1000 UT) Oral Cap, Take 2 tablets by mouth daily. 5,000 also has k-2  Current Facility-Administered Medications on File Prior to Visit: [COMPLETED] iopamidol 76% (ISOVUE-370) injection for power injector,        21st [employer] Cures Act Notice to Patient: Medical documents like this are made available to patients in the interest of transparency. However, be advised this is a medical document and it is intended as [id number] communication between your medical providers. This medical document may contain abbreviations, assessments, medical data, and results or other terms that are unfamiliar. Medical documents are intended to carry relevant information, facts as evident, and the clinical opinion of the practitioner. As such, this medical document may be written in language that appears blunt or direct. You are encouraged to contact your medical provider and/or [facility] Patient Experience if you have any questions about this medical document.

## (undated) NOTE — Clinical Note
Hi Dr. Kavita Paredes attached my initial endocrine consult note for our shared pt.  My [employer] license is still not uploaded to [address] [place] [address] Rd (clinical manager just placed an IT ticket) so if you don't mind, to please continue testosterone refill one more time until I get it sorted out, thanks!  -Keara Concepcion